# Patient Record
Sex: MALE | Race: WHITE | Employment: FULL TIME | URBAN - METROPOLITAN AREA
[De-identification: names, ages, dates, MRNs, and addresses within clinical notes are randomized per-mention and may not be internally consistent; named-entity substitution may affect disease eponyms.]

---

## 2017-01-08 DIAGNOSIS — E78.00 PURE HYPERCHOLESTEROLEMIA: ICD-10-CM

## 2017-01-08 RX ORDER — SIMVASTATIN 20 MG/1
TABLET, FILM COATED ORAL
Qty: 90 TAB | Refills: 1 | Status: SHIPPED | OUTPATIENT
Start: 2017-01-08 | End: 2017-11-03 | Stop reason: SDUPTHER

## 2017-01-11 ENCOUNTER — TELEPHONE (OUTPATIENT)
Dept: INTERNAL MEDICINE CLINIC | Age: 52
End: 2017-01-11

## 2017-01-11 DIAGNOSIS — E78.00 PURE HYPERCHOLESTEROLEMIA: Primary | ICD-10-CM

## 2017-01-11 NOTE — TELEPHONE ENCOUNTER
Patient has set up his follow up appt for cholesterol. If he needs bloodwork they have to go to [facility] so the wife would like the lab request ahead of time so the results will be back for the appt. On the 20th. She would like a call back if the bloodwork can be done ahead of time.   [phone]

## 2017-01-20 ENCOUNTER — OFFICE VISIT (OUTPATIENT)
Dept: INTERNAL MEDICINE CLINIC | Age: 52
End: 2017-01-20

## 2017-01-20 VITALS
WEIGHT: 188 LBS | HEART RATE: 69 BPM | HEIGHT: 70 IN | RESPIRATION RATE: 12 BRPM | BODY MASS INDEX: 26.92 KG/M2 | TEMPERATURE: 98.2 F | DIASTOLIC BLOOD PRESSURE: 77 MMHG | SYSTOLIC BLOOD PRESSURE: 113 MMHG

## 2017-01-20 DIAGNOSIS — R68.82 LOW LIBIDO: ICD-10-CM

## 2017-01-20 DIAGNOSIS — E78.00 PURE HYPERCHOLESTEROLEMIA: Primary | ICD-10-CM

## 2017-01-20 NOTE — MR AVS SNAPSHOT
Visit Information Date & Time Provider Department Dept. Phone Encounter #  
 1/20/2017  8:15 AM Glennda Baumgarten, MD [facility] [address] [id number] Follow-up Instructions Return in about 6 months (around 7/20/2017). Upcoming Health Maintenance Date Due Hepatitis C Screening 1965 DTaP/Tdap/Td series (1 - Tdap) 9/5/1984 INFLUENZA AGE 9 TO ADULT 10/1/2017* COLONOSCOPY 10/8/2017 *Topic was postponed. The date shown is not the original due date. Allergies as of 1/20/2017  Review Complete On: 1/20/2017 By: Glennda Baumgarten, MD  
  
 Severity Noted Reaction Type Reactions Lipitor [Atorvastatin]  06/20/2016    Other (comments) Current Immunizations  Never Reviewed Name Date Influenza Vaccine 10/1/2015 Not reviewed this visit You Were Diagnosed With   
  
 Codes Comments Pure hypercholesterolemia    -  Primary ICD-10-CM: E78.00 ICD-9-CM: 272.0 Low libido     ICD-10-CM: R68.82 
ICD-9-CM: 799.81 Vitals BP Pulse Temp Resp Height(growth percentile) Weight(growth percentile) 113/77 (BP 1 Location: Left arm, BP Patient Position: Sitting) 69 98.2 °F (36.8 °C) (Oral) 12 5' 10\" (1.778 m) 188 lb (85.3 kg) BMI Smoking Status 26.98 kg/m2 Former Smoker Vitals History BMI and BSA Data Body Mass Index Body Surface Area  
 26.98 kg/m 2 2.05 m 2 Preferred Pharmacy Pharmacy Name Phone [facility] [place] [address] [facility] Your Updated Medication List  
  
   
This list is accurate as of: 1/20/17  8:53 AM.  Always use your most recent med list.  
  
  
  
  
 simvastatin 20 mg tablet Commonly known as:  ZOCOR  
TAKE 1 TABLET BY MOUTH ONE TIME EVERY NIGHT Follow-up Instructions Return in about 6 months (around 7/20/2017). Introducing [facility] & HEALTH SERVICES!    
 Dear Emory Coughlin: 
 Thank you for requesting a Snapwiz account. Our records indicate that you already have an active Snapwiz account. You can access your account anytime at [email or web address] Did you know that you can access your hospital and ER discharge instructions at any time in Snapwiz? You can also review all of your test results from your hospital stay or ER visit. Additional Information If you have questions, please visit the Frequently Asked Questions section of the Snapwiz website at [email or web address] Remember, Snapwiz is NOT to be used for urgent needs. For medical emergencies, dial 911. Now available from your iPhone and Android! Please provide this summary of care documentation to your next provider. Your primary care clinician is listed as Lily Siegel. If you have any questions after today's visit, please call [phone].

## 2017-01-20 NOTE — PROGRESS NOTES
HISTORY OF PRESENT ILLNESS    Chief Complaint   Patient presents with    Cholesterol Problem       Presents for follow-up    Reports his sex drive is decreased for years. His chiropractor referred him to a pharmacy. He is not concerned about this since his wife gained weight. She is 10 years younger. Has sex once per month. Denies significant erectile dysfunction. No hx of low T. Hyperlipidemia  Currently he takes new rx of simvastatin 20 mg  ROS: taking medications as instructed, no medication side effects noted  No new myalgias, no joint pains, no weakness  No TIA's, no chest pain on exertion, no dyspnea on exertion, no swelling of ankles. Labs at [facility] 1/16/17:  Chol 178, , HDL 49,     Lab Results   Component Value Date/Time    Cholesterol, total 286 05/27/2016 08:21 AM    HDL Cholesterol 35 05/27/2016 08:21 AM    LDL, calculated 181 05/27/2016 08:21 AM    VLDL, calculated 70 05/27/2016 08:21 AM    Triglyceride 351 05/27/2016 08:21 AM         Review of Systems   All other systems reviewed and are negative, except as noted in HPI    Past Medical and Surgical History   has a past medical history of Chronic low back pain without sciatica; Chronic lumbar pain; Gastroesophageal reflux disease without esophagitis; GERD (gastroesophageal reflux disease); Neck pain, chronic; and Pure hypercholesterolemia. has a past surgical history that includes lumbar laminectomy (3/5/97); vasectomy (2002); endoscopy (11/8/10); and colonoscopy (10/8/07). reports that he has quit smoking. He does not have any smokeless tobacco history on file. He reports that he drinks alcohol.  family history is negative for Heart Disease, Heart Attack, Cancer, and Asthma. Physical Exam   Nursing note and vitals reviewed. Blood pressure 113/77, pulse 69, temperature 98.2 °F (36.8 °C), temperature source Oral, resp. rate 12, height 5' 10\" (1.778 m), weight 188 lb (85.3 kg). Constitutional:  No distress.     Eyes: Conjunctivae are normal.   Ears:  Hearing grossly intact  Cardiovascular: Normal rate. regular rhythm, no murmurs or gallops  No edema  Pulmonary/Chest: Effort normal.   CTAB  Musculoskeletal: moves all 4 extremities   Neurological: Alert and oriented to person, place, and time. Skin: No rash noted. Psychiatric: Normal mood and affect. Behavior is normal.     ASSESSMENT and PLAN  Jackeline Iniguez was seen today for cholesterol problem. Diagnoses and all orders for this visit:    Pure hypercholesterolemia  Much improved. Cont simvastatin    Low libido  Offered to check testosterone levels- he prefers to wait until next labs. He is not sure if he wants this improve and so may not want treatment anyway. Consider couples counseling. lab results and schedule of future lab studies reviewed with patient  reviewed medications and side effects in detail  Return to clinic for further evaluation if new symptoms develop      Current Outpatient Prescriptions   Medication Sig    simvastatin (ZOCOR) 20 mg tablet TAKE 1 TABLET BY MOUTH ONE TIME EVERY NIGHT     No current facility-administered medications for this visit.

## 2017-04-04 ENCOUNTER — OFFICE VISIT (OUTPATIENT)
Dept: INTERNAL MEDICINE CLINIC | Age: 52
End: 2017-04-04

## 2017-04-04 VITALS
TEMPERATURE: 97.7 F | BODY MASS INDEX: 27.06 KG/M2 | SYSTOLIC BLOOD PRESSURE: 100 MMHG | DIASTOLIC BLOOD PRESSURE: 67 MMHG | WEIGHT: 189 LBS | OXYGEN SATURATION: 94 % | HEIGHT: 70 IN | HEART RATE: 75 BPM | RESPIRATION RATE: 18 BRPM

## 2017-04-04 DIAGNOSIS — J30.1 SEASONAL ALLERGIC RHINITIS DUE TO POLLEN: ICD-10-CM

## 2017-04-04 DIAGNOSIS — H10.13 ALLERGIC CONJUNCTIVITIS, BILATERAL: Primary | ICD-10-CM

## 2017-04-04 RX ORDER — LORATADINE 10 MG/1
10 TABLET ORAL
Qty: 30 TAB | Refills: 2
Start: 2017-04-04 | End: 2017-04-18

## 2017-04-04 RX ORDER — KETOTIFEN FUMARATE 0.35 MG/ML
1 SOLUTION/ DROPS OPHTHALMIC 2 TIMES DAILY
Qty: 10 ML | Refills: 0
Start: 2017-04-04 | End: 2017-04-14

## 2017-04-04 NOTE — MR AVS SNAPSHOT
Visit Information Date & Time Provider Department Dept. Phone Encounter #  
 4/4/2017  3:20 PM Refugio Rosenthal NP [facility] [address] [id number] Upcoming Health Maintenance Date Due Hepatitis C Screening 1965 DTaP/Tdap/Td series (1 - Tdap) 9/5/1984 INFLUENZA AGE 9 TO ADULT 10/1/2017* COLONOSCOPY 10/8/2017 *Topic was postponed. The date shown is not the original due date. Allergies as of 4/4/2017  Review Complete On: 4/4/2017 By: Elizabeth Lima LPN Severity Noted Reaction Type Reactions Lipitor [Atorvastatin]  06/20/2016    Other (comments) Current Immunizations  Never Reviewed Name Date Influenza Vaccine 10/1/2015 Not reviewed this visit You Were Diagnosed With   
  
 Codes Comments Allergic conjunctivitis, bilateral    -  Primary ICD-10-CM: H10.13 ICD-9-CM: 372.14 Seasonal allergic rhinitis due to pollen     ICD-10-CM: J30.1 ICD-9-CM: 477.0 Vitals BP Pulse Temp Resp Height(growth percentile) Weight(growth percentile) 100/67 75 97.7 °F (36.5 °C) (Oral) 18 5' 10\" (1.778 m) 189 lb (85.7 kg) SpO2 BMI Smoking Status 94% 27.12 kg/m2 Former Smoker Vitals History BMI and BSA Data Body Mass Index Body Surface Area  
 27.12 kg/m 2 2.06 m 2 Preferred Pharmacy [facility]acy Name Phone [facility] [address] [facility] Your Updated Medication List  
  
   
This list is accurate as of: 4/4/17  4:09 PM.  Always use your most recent med list.  
  
  
  
  
 ketotifen 0.025 % (0.035 %) ophthalmic solution Commonly known as:  ZADITOR Administer 1 Drop to both eyes two (2) times a day for 10 days. loratadine 10 mg tablet Commonly known as:  [employer] Take 1 Tab by mouth daily as needed for Allergies. simvastatin 20 mg tablet Commonly known as:  ZOCOR  
TAKE 1 TABLET BY MOUTH ONE TIME EVERY NIGHT Patient Instructions Allergies: Care Instructions Your Care Instructions Allergies occur when your body's defense system (immune system) overreacts to certain substances. The immune system treats a harmless substance as if it were a harmful germ or virus. Many things can cause this overreaction, including pollens, medicine, food, dust, animal dander, and mold. Allergies can be mild or severe. Mild allergies can be managed with home treatment. But medicine may be needed to prevent problems. Managing your allergies is an important part of staying healthy. Your doctor may suggest that you have allergy testing to help find out what is causing your allergies. When you know what things trigger your symptoms, you can avoid them. This can prevent allergy symptoms and other health problems. For severe allergies that cause reactions that affect your whole body (anaphylactic reactions), your doctor may prescribe a shot of epinephrine to carry with you in case you have a severe reaction. Learn how to give yourself the shot and keep it with you at all times. Make sure it is not . Follow-up care is a key part of your treatment and safety. Be sure to make and go to all appointments, and call your doctor if you are having problems. It's also a good idea to know your test results and keep a list of the medicines you take. How can you care for yourself at home? · If you have been told by your doctor that dust or dust mites are causing your allergy, decrease the dust around your bed: 
[facility] sheets, pillowcases, and other bedding in hot water every week. ¨ Use dust-proof covers for pillows, duvets, and mattresses. Avoid plastic covers because they tear easily and do not \"breathe. \" Wash as instructed on the label. ¨ Do not use any blankets and pillows that you do not need. ¨ Use blankets that you can wash in your washing machine. ¨ Consider removing drapes and carpets, which attract and hold dust, from your bedroom. · If you are allergic to house dust and mites, do not use home humidifiers. Your doctor can suggest ways you can control dust and mites. · Look for signs of cockroaches. Cockroaches cause allergic reactions. Use cockroach baits to get rid of them. Then, clean your home well. Cockroaches like areas where grocery bags, newspapers, empty bottles, or cardboard boxes are stored. Do not keep these inside your home, and keep trash and food containers sealed. Seal off any spots where cockroaches might enter your home. · If you are allergic to mold, get rid of furniture, rugs, and drapes that smell musty. Check for mold in the bathroom. · If you are allergic to outdoor pollen or mold spores, use air-conditioning. Change or clean all filters every month. Keep windows closed. · If you are allergic to pollen, stay inside when pollen counts are high. Use a vacuum  with a HEPA filter or a double-thickness filter at least two times each week. · Stay inside when air pollution is bad. Avoid paint fumes, perfumes, and other strong odors. · Avoid conditions that make your allergies worse. Stay away from smoke. Do not smoke or let anyone else smoke in your house. Do not use fireplaces or wood-burning stoves. · If you are allergic to your pets, change the air filter in your furnace every month. Use high-efficiency filters. · If you are allergic to pet dander, keep pets outside or out of your bedroom. Old carpet and cloth furniture can hold a lot of animal dander. You may need to replace them. When should you call for help? Give an epinephrine shot if: 
· You think you are having a severe allergic reaction. · You have symptoms in more than one body area, such as mild nausea and an itchy mouth. After giving an epinephrine shot call 911, even if you feel better. Call 911 if: · You have symptoms of a severe allergic reaction. These may include: 
¨ Sudden raised, red areas (hives) all over your body. ¨ Swelling of the throat, mouth, lips, or tongue. ¨ Trouble breathing. ¨ Passing out (losing consciousness). Or you may feel very lightheaded or suddenly feel weak, confused, or restless. · You have been given an epinephrine shot, even if you feel better. Call your doctor now or seek immediate medical care if: 
· You have symptoms of an allergic reaction, such as: ¨ A rash or hives (raised, red areas on the skin). ¨ Itching. ¨ Swelling. ¨ Belly pain, nausea, or vomiting. Watch closely for changes in your health, and be sure to contact your doctor if: 
· You do not get better as expected. Where can you learn more? Go to http://joelle-lee.info/. Enter O967 in the search box to learn more about \"Allergies: Care Instructions. \" Current as of: February 12, 2016 Content Version: 11.2 © 1647-9178 [employer]. Care instructions adapted under license by Set.fm (which disclaims liability or warranty for this information). If you have questions about a medical condition or this instruction, always ask your healthcare professional. [place] [address] any warranty or liability for your use of this information. Pinkeye: Care Instructions Your Care Instructions Pinkeye is redness and swelling of the eye surface and the conjunctiva (the lining of the eyelid and the covering of the white part of the eye). Pinkeye is also called conjunctivitis. Pinkeye is often caused by infection with bacteria or a virus. Dry air, allergies, smoke, and chemicals are other common causes. Pinkeye often clears on its own in 7 to 10 days. Antibiotics only help if the pinkeye is caused by bacteria. Pinkeye caused by infection spreads easily.  If an allergy or chemical is causing pinkeye, it will not go away unless you can avoid whatever is causing it. Follow-up care is a key part of your treatment and safety. Be sure to make and go to all appointments, and call your doctor if you are having problems. Its also a good idea to know your test results and keep a list of the medicines you take. How can you care for yourself at home? · Wash your hands often. Always wash them before and after you treat pinkeye or touch your eyes or face. · Use moist cotton or a clean, wet cloth to remove crust. Wipe from the inside corner of the eye to the outside. Use a clean part of the cloth for each wipe. · Put cold or warm wet cloths on your eye a few times a day if the eye hurts. · Do not wear contact lenses or eye makeup until the pinkeye is gone. Throw away any eye makeup you were using when you got pinkeye. Clean your contacts and storage case. If you wear disposable contacts, use a new pair when your eye has cleared and it is safe to wear contacts again. · If the doctor gave you antibiotic ointment or eyedrops, use them as directed. Use the medicine for as long as instructed, even if your eye starts looking better soon. Keep the bottle tip clean, and do not let it touch the eye area. · To put in eyedrops or ointment: ¨ Tilt your head back, and pull your lower eyelid down with one finger. ¨ Drop or squirt the medicine inside the lower lid. ¨ Close your eye for 30 to 60 seconds to let the drops or ointment move around. ¨ Do not touch the ointment or dropper tip to your eyelashes or any other surface. · Do not share towels, pillows, or washcloths while you have pinkeye. When should you call for help? Call your doctor now or seek immediate medical care if: 
· You have pain in your eye, not just irritation on the surface. · You have a change in vision or loss of vision. · You have an increase in discharge from the eye.  
· Your eye has not started to improve or begins to get worse within 48 hours after you start using antibiotics. · Pinkeye lasts longer than 7 days. Watch closely for changes in your health, and be sure to contact your doctor if you have any problems. Where can you learn more? Go to http://joelle-lee.info/. Enter Y392 in the search box to learn more about \"Pinkeye: Care Instructions. \" Current as of: May 27, 2016 Content Version: 11.2 © 4029-0962 Im[employer]ur. Care instructions adapted under license by Distra (which disclaims liability or warranty for this information). If you have questions about a medical condition or this instruction, always ask your healthcare professional. [address] any warranty or liability for your use of this information. Introducing [facility] & HEALTH SERVICES! Dear Stacy Olivera: Thank you for requesting a Orugga account. Our records indicate that you already have an active Orugga account. You can access your account anytime at [email or web address] Did you know that you can access your hospital and ER discharge instructions at any time in Orugga? You can also review all of your test results from your hospital stay or ER visit. Additional Information If you have questions, please visit the Frequently Asked Questions section of the Orugga website at [email or web address] Remember, Orugga is NOT to be used for urgent needs. For medical emergencies, dial 911. Now available from your iPhone and Android! Please provide this summary of care documentation to your next provider. Your primary care clinician is listed as Elsy Oliveros. If you have any questions after today's visit, please call [phone].

## 2017-04-04 NOTE — PROGRESS NOTES
HISTORY OF PRESENT ILLNESS  Iliana Valero is a 46 y.o. male. HPI  Presents with complaints of reddened itchy watery eyes for the past several days and woke this am with right upper eyelid swollen. Has noted some thicker white drainage but has not seen any yellow or green. Denies vision changes or pain in eyes. Moved to [facility] from [facility] but he is originally from [place] and denies any issues with allergies in past.  Has been noting some increased post nasal drainage and some clear nasal drainage. Denies fever, chills, purulent mucous. Review of Systems   Constitutional: Negative for chills, fever and malaise/fatigue. HENT: Positive for congestion. Negative for sore throat. Eyes: Positive for discharge and redness. Respiratory: Negative for cough, sputum production and shortness of breath. Cardiovascular: Negative for chest pain and palpitations. Gastrointestinal: Negative for nausea and vomiting. Musculoskeletal: Negative for myalgias. Skin: Negative for rash. Neurological: Positive for headaches. Negative for dizziness and tingling. Physical Exam   Constitutional: He is oriented to person, place, and time. He appears well-developed and well-nourished. HENT:   Head: Normocephalic and atraumatic. Nose: Mucosal edema present. Mouth/Throat: No posterior oropharyngeal edema or posterior oropharyngeal erythema. Eyes: EOM are normal. Pupils are equal, round, and reactive to light. Right eye exhibits no exudate. Left eye exhibits no exudate. Right conjunctiva is injected. Left conjunctiva is injected. Bilateral conjunctiva mildly injected with watery appearance; mild edema to right upper eyelid without warmth or tenderness   Neck: Normal range of motion. Neck supple. No thyromegaly present. Cardiovascular: Normal rate and regular rhythm. Pulmonary/Chest: Effort normal and breath sounds normal.   Lymphadenopathy:     He has no cervical adenopathy. Neurological: He is alert and oriented to person, place, and time. Psychiatric: He has a normal mood and affect. His behavior is normal.   Nursing note and vitals reviewed. ASSESSMENT and PLAN  Stacy Joselin was seen today for eye swelling and itchy eye. Diagnoses and all orders for this visit:    Allergic conjunctivitis, bilateral  -     ketotifen (ZADITOR) 0.025 % (0.035 %) ophthalmic solution; Administer 1 Drop to both eyes two (2) times a day for 10 days. Seasonal allergic rhinitis due to pollen  -     loratadine (CLARITIN) 10 mg tablet; Take 1 Tab by mouth daily as needed for Allergies.       lab results and schedule of future lab studies reviewed with patient  reviewed diet, exercise and weight control  reviewed medications and side effects in detail

## 2017-04-04 NOTE — PATIENT INSTRUCTIONS
Allergies: Care Instructions  Your Care Instructions  Allergies occur when your body's defense system (immune system) overreacts to certain substances. The immune system treats a harmless substance as if it were a harmful germ or virus. Many things can cause this overreaction, including pollens, medicine, food, dust, animal dander, and mold. Allergies can be mild or severe. Mild allergies can be managed with home treatment. But medicine may be needed to prevent problems. Managing your allergies is an important part of staying healthy. Your doctor may suggest that you have allergy testing to help find out what is causing your allergies. When you know what things trigger your symptoms, you can avoid them. This can prevent allergy symptoms and other health problems. For severe allergies that cause reactions that affect your whole body (anaphylactic reactions), your doctor may prescribe a shot of epinephrine to carry with you in case you have a severe reaction. Learn how to give yourself the shot and keep it with you at all times. Make sure it is not . Follow-up care is a key part of your treatment and safety. Be sure to make and go to all appointments, and call your doctor if you are having problems. It's also a good idea to know your test results and keep a list of the medicines you take. How can you care for yourself at home? · If you have been told by your doctor that dust or dust mites are causing your allergy, decrease the dust around your bed:  [facility] sheets, pillowcases, and other bedding in hot water every week. ¨ Use dust-proof covers for pillows, duvets, and mattresses. Avoid plastic covers because they tear easily and do not \"breathe. \" Wash as instructed on the label. ¨ Do not use any blankets and pillows that you do not need. ¨ Use blankets that you can wash in your washing machine. ¨ Consider removing drapes and carpets, which attract and hold dust, from your bedroom.   · If you are allergic to house dust and mites, do not use home humidifiers. Your doctor can suggest ways you can control dust and mites. · Look for signs of cockroaches. Cockroaches cause allergic reactions. Use cockroach baits to get rid of them. Then, clean your home well. Cockroaches like areas where grocery bags, newspapers, empty bottles, or cardboard boxes are stored. Do not keep these inside your home, and keep trash and food containers sealed. Seal off any spots where cockroaches might enter your home. · If you are allergic to mold, get rid of furniture, rugs, and drapes that smell musty. Check for mold in the bathroom. · If you are allergic to outdoor pollen or mold spores, use air-conditioning. Change or clean all filters every month. Keep windows closed. · If you are allergic to pollen, stay inside when pollen counts are high. Use a vacuum  with a HEPA filter or a double-thickness filter at least two times each week. · Stay inside when air pollution is bad. Avoid paint fumes, perfumes, and other strong odors. · Avoid conditions that make your allergies worse. Stay away from smoke. Do not smoke or let anyone else smoke in your house. Do not use fireplaces or wood-burning stoves. · If you are allergic to your pets, change the air filter in your furnace every month. Use high-efficiency filters. · If you are allergic to pet dander, keep pets outside or out of your bedroom. Old carpet and cloth furniture can hold a lot of animal dander. You may need to replace them. When should you call for help? Give an epinephrine shot if:  · You think you are having a severe allergic reaction. · You have symptoms in more than one body area, such as mild nausea and an itchy mouth. After giving an epinephrine shot call 911, even if you feel better. Call 911 if:  · You have symptoms of a severe allergic reaction. These may include:  ¨ Sudden raised, red areas (hives) all over your body.   ¨ Swelling of the throat, mouth, lips, or tongue. ¨ Trouble breathing. ¨ Passing out (losing consciousness). Or you may feel very lightheaded or suddenly feel weak, confused, or restless. · You have been given an epinephrine shot, even if you feel better. Call your doctor now or seek immediate medical care if:  · You have symptoms of an allergic reaction, such as:  ¨ A rash or hives (raised, red areas on the skin). ¨ Itching. ¨ Swelling. ¨ Belly pain, nausea, or vomiting. Watch closely for changes in your health, and be sure to contact your doctor if:  · You do not get better as expected. Where can you learn more? Go to http://joelle-lee.info/. Enter O381 in the search box to learn more about \"Allergies: Care Instructions. \"  Current as of: February 12, 2016  Content Version: 11.2  © 2823-7607 [employer]. Care instructions adapted under license by [employer] (which disclaims liability or warranty for this information). If you have questions about a medical condition or this instruction, always ask your healthcare professional. [place] [address] any warranty or liability for your use of this information. Pinkeye: Care Instructions  Your Care Instructions    Pinkeye is redness and swelling of the eye surface and the conjunctiva (the lining of the eyelid and the covering of the white part of the eye). Pinkeye is also called conjunctivitis. Pinkeye is often caused by infection with bacteria or a virus. Dry air, allergies, smoke, and chemicals are other common causes. Pinkeye often clears on its own in 7 to 10 days. Antibiotics only help if the pinkeye is caused by bacteria. Pinkeye caused by infection spreads easily. If an allergy or chemical is causing pinkeye, it will not go away unless you can avoid whatever is causing it. Follow-up care is a key part of your treatment and safety.  Be sure to make and go to all appointments, and call your doctor if you are having problems. Its also a good idea to know your test results and keep a list of the medicines you take. How can you care for yourself at home? · Wash your hands often. Always wash them before and after you treat pinkeye or touch your eyes or face. · Use moist cotton or a clean, wet cloth to remove crust. Wipe from the inside corner of the eye to the outside. Use a clean part of the cloth for each wipe. · Put cold or warm wet cloths on your eye a few times a day if the eye hurts. · Do not wear contact lenses or eye makeup until the pinkeye is gone. Throw away any eye makeup you were using when you got pinkeye. Clean your contacts and storage case. If you wear disposable contacts, use a new pair when your eye has cleared and it is safe to wear contacts again. · If the doctor gave you antibiotic ointment or eyedrops, use them as directed. Use the medicine for as long as instructed, even if your eye starts looking better soon. Keep the bottle tip clean, and do not let it touch the eye area. · To put in eyedrops or ointment:  ¨ Tilt your head back, and pull your lower eyelid down with one finger. ¨ Drop or squirt the medicine inside the lower lid. ¨ Close your eye for 30 to 60 seconds to let the drops or ointment move around. ¨ Do not touch the ointment or dropper tip to your eyelashes or any other surface. · Do not share towels, pillows, or washcloths while you have pinkeye. When should you call for help? Call your doctor now or seek immediate medical care if:  · You have pain in your eye, not just irritation on the surface. · You have a change in vision or loss of vision. · You have an increase in discharge from the eye. · Your eye has not started to improve or begins to get worse within 48 hours after you start using antibiotics. · Pinkeye lasts longer than 7 days. Watch closely for changes in your health, and be sure to contact your doctor if you have any problems. Where can you learn more?   Go to http://joelle-lee.info/. Enter Y392 in the search box to learn more about \"Hebervini: Care Instructions. \"  Current as of: May 27, 2016  Content Version: 11.2  © 2435-1026 [employer]. Care instructions adapted under license by [employer] (which disclaims liability or warranty for this information). If you have questions about a medical condition or this instruction, always ask your healthcare professional. [address] any warranty or liability for your use of this information.

## 2017-04-18 ENCOUNTER — HOSPITAL ENCOUNTER (EMERGENCY)
Age: 52
Discharge: HOME | End: 2017-04-18
Attending: EMERGENCY MEDICINE
Payer: COMMERCIAL

## 2017-04-18 VITALS
HEIGHT: 70 IN | DIASTOLIC BLOOD PRESSURE: 71 MMHG | SYSTOLIC BLOOD PRESSURE: 113 MMHG | TEMPERATURE: 98.2 F | RESPIRATION RATE: 18 BRPM | BODY MASS INDEX: 26.32 KG/M2 | HEART RATE: 82 BPM | OXYGEN SATURATION: 96 % | WEIGHT: 183.86 LBS

## 2017-04-18 DIAGNOSIS — R11.2 NAUSEA VOMITING AND DIARRHEA: Primary | ICD-10-CM

## 2017-04-18 DIAGNOSIS — R19.7 NAUSEA VOMITING AND DIARRHEA: Primary | ICD-10-CM

## 2017-04-18 LAB
ALBUMIN SERPL BCP-MCNC: 3.5 G/DL (ref 3.5–5)
ALBUMIN/GLOB SERPL: 1 {RATIO} (ref 1.1–2.2)
ALP SERPL-CCNC: 67 U/L (ref 45–117)
ALT SERPL-CCNC: 62 U/L (ref 12–78)
ANION GAP BLD CALC-SCNC: 9 MMOL/L (ref 5–15)
AST SERPL W P-5'-P-CCNC: 25 U/L (ref 15–37)
BASOPHILS # BLD AUTO: 0 K/UL (ref 0–0.1)
BASOPHILS # BLD: 0 % (ref 0–1)
BILIRUB SERPL-MCNC: 1.1 MG/DL (ref 0.2–1)
BUN SERPL-MCNC: 10 MG/DL (ref 6–20)
BUN/CREAT SERPL: 8 (ref 12–20)
CALCIUM SERPL-MCNC: 8.6 MG/DL (ref 8.5–10.1)
CHLORIDE SERPL-SCNC: 101 MMOL/L (ref 97–108)
CO2 SERPL-SCNC: 28 MMOL/L (ref 21–32)
CREAT SERPL-MCNC: 1.28 MG/DL (ref 0.7–1.3)
DIFFERENTIAL METHOD BLD: ABNORMAL
EOSINOPHIL # BLD: 0.1 K/UL (ref 0–0.4)
EOSINOPHIL NFR BLD: 2 % (ref 0–7)
ERYTHROCYTE [DISTWIDTH] IN BLOOD BY AUTOMATED COUNT: 11.9 % (ref 11.5–14.5)
GLOBULIN SER CALC-MCNC: 3.6 G/DL (ref 2–4)
GLUCOSE SERPL-MCNC: 105 MG/DL (ref 65–100)
HCT VFR BLD AUTO: 43 % (ref 36.6–50.3)
HGB BLD-MCNC: 15.3 G/DL (ref 12.1–17)
LYMPHOCYTES # BLD AUTO: 23 % (ref 12–49)
LYMPHOCYTES # BLD: 1.3 K/UL
MCH RBC QN AUTO: 31.4 PG (ref 26–34)
MCHC RBC AUTO-ENTMCNC: 35.6 G/DL (ref 30–36.5)
MCV RBC AUTO: 88.1 FL (ref 80–99)
MONOCYTES # BLD: 1 K/UL (ref 0–1)
MONOCYTES NFR BLD AUTO: 18 % (ref 5–13)
NEUTS SEG # BLD: 3.3 K/UL (ref 1.8–8)
NEUTS SEG NFR BLD AUTO: 57 % (ref 32–75)
PLATELET # BLD AUTO: 205 K/UL (ref 150–400)
POTASSIUM SERPL-SCNC: 4 MMOL/L (ref 3.5–5.1)
PROT SERPL-MCNC: 7.1 G/DL (ref 6.4–8.2)
RBC # BLD AUTO: 4.88 M/UL (ref 4.1–5.7)
SODIUM SERPL-SCNC: 138 MMOL/L (ref 136–145)
WBC # BLD AUTO: 5.8 K/UL (ref 4.1–11.1)

## 2017-04-18 PROCEDURE — 87045 FECES CULTURE AEROBIC BACT: CPT | Performed by: EMERGENCY MEDICINE

## 2017-04-18 PROCEDURE — 80053 COMPREHEN METABOLIC PANEL: CPT | Performed by: EMERGENCY MEDICINE

## 2017-04-18 PROCEDURE — 85025 COMPLETE CBC W/AUTO DIFF WBC: CPT | Performed by: EMERGENCY MEDICINE

## 2017-04-18 PROCEDURE — 96361 HYDRATE IV INFUSION ADD-ON: CPT

## 2017-04-18 PROCEDURE — 36415 COLL VENOUS BLD VENIPUNCTURE: CPT | Performed by: EMERGENCY MEDICINE

## 2017-04-18 PROCEDURE — 96374 THER/PROPH/DIAG INJ IV PUSH: CPT

## 2017-04-18 PROCEDURE — 74011250637 HC RX REV CODE- 250/637: Performed by: EMERGENCY MEDICINE

## 2017-04-18 PROCEDURE — 74011250636 HC RX REV CODE- 250/636: Performed by: EMERGENCY MEDICINE

## 2017-04-18 PROCEDURE — 99283 EMERGENCY DEPT VISIT LOW MDM: CPT

## 2017-04-18 RX ORDER — SODIUM CHLORIDE 0.9 % (FLUSH) 0.9 %
5-10 SYRINGE (ML) INJECTION AS NEEDED
Status: DISCONTINUED | OUTPATIENT
Start: 2017-04-18 | End: 2017-04-18 | Stop reason: HOSPADM

## 2017-04-18 RX ORDER — SODIUM CHLORIDE 0.9 % (FLUSH) 0.9 %
5-10 SYRINGE (ML) INJECTION EVERY 8 HOURS
Status: DISCONTINUED | OUTPATIENT
Start: 2017-04-18 | End: 2017-04-18 | Stop reason: HOSPADM

## 2017-04-18 RX ORDER — DICYCLOMINE HYDROCHLORIDE 10 MG/1
20 CAPSULE ORAL
Status: COMPLETED | OUTPATIENT
Start: 2017-04-18 | End: 2017-04-18

## 2017-04-18 RX ORDER — DIPHENOXYLATE HYDROCHLORIDE AND ATROPINE SULFATE 2.5; .025 MG/1; MG/1
TABLET ORAL
Qty: 20 TAB | Refills: 0 | Status: SHIPPED | OUTPATIENT
Start: 2017-04-18 | End: 2018-02-23 | Stop reason: ALTCHOICE

## 2017-04-18 RX ORDER — DIPHENOXYLATE HYDROCHLORIDE AND ATROPINE SULFATE 2.5; .025 MG/1; MG/1
2 TABLET ORAL
Status: COMPLETED | OUTPATIENT
Start: 2017-04-18 | End: 2017-04-18

## 2017-04-18 RX ORDER — ONDANSETRON 4 MG/1
4 TABLET, ORALLY DISINTEGRATING ORAL
Qty: 30 TAB | Refills: 0 | Status: SHIPPED | OUTPATIENT
Start: 2017-04-18 | End: 2018-02-23 | Stop reason: ALTCHOICE

## 2017-04-18 RX ORDER — ONDANSETRON 2 MG/ML
4 INJECTION INTRAMUSCULAR; INTRAVENOUS
Status: COMPLETED | OUTPATIENT
Start: 2017-04-18 | End: 2017-04-18

## 2017-04-18 RX ADMIN — DICYCLOMINE HYDROCHLORIDE 20 MG: 10 CAPSULE ORAL at 10:01

## 2017-04-18 RX ADMIN — SODIUM CHLORIDE 1000 ML: 900 INJECTION, SOLUTION INTRAVENOUS at 08:56

## 2017-04-18 RX ADMIN — ONDANSETRON 4 MG: 2 INJECTION INTRAMUSCULAR; INTRAVENOUS at 08:56

## 2017-04-18 RX ADMIN — DIPHENOXYLATE HYDROCHLORIDE AND ATROPINE SULFATE 2 TABLET: 2.5; .025 TABLET ORAL at 08:56

## 2017-04-18 NOTE — ED NOTES
The patient was discharged home by Dr. Kami Hinkle and Mary Gould RN in stable condition, accompanied by parent/guardian . The patient is alert and oriented, is in no respiratory distress. The patient's diagnosis, condition and treatment were explained to patient or parent/guardian. The patient/responsible party expressed understanding. 2 prescriptions given to pt. No work/school note given to pt. A discharge plan has been developed. A  was not involved in the process. Aftercare instructions were given to the patient.

## 2017-04-18 NOTE — DISCHARGE INSTRUCTIONS
Diarrhea: Care Instructions  Your Care Instructions    Diarrhea is loose, watery stools (bowel movements). The exact cause is often hard to find. Sometimes diarrhea is your body's way of getting rid of what caused an upset stomach. Viruses, food poisoning, and many medicines can cause diarrhea. Some people get diarrhea in response to emotional stress, anxiety, or certain foods. Almost everyone has diarrhea now and then. It usually isn't serious, and your stools will return to normal soon. The important thing to do is replace the fluids you have lost, so you can prevent dehydration. The doctor has checked you carefully, but problems can develop later. If you notice any problems or new symptoms, get medical treatment right away. Follow-up care is a key part of your treatment and safety. Be sure to make and go to all appointments, and call your doctor if you are having problems. It's also a good idea to know your test results and keep a list of the medicines you take. How can you care for yourself at home? · Watch for signs of dehydration, which means your body has lost too much water. Dehydration is a serious condition and should be treated right away. Signs of dehydration are:  ¨ Increasing thirst and dry eyes and mouth. ¨ Feeling faint or lightheaded. ¨ Darker urine, and a smaller amount of urine than normal.  · To prevent dehydration, drink plenty of fluids, enough so that your urine is light yellow or clear like water. Choose water and other caffeine-free clear liquids until you feel better. If you have kidney, heart, or liver disease and have to limit fluids, talk with your doctor before you increase the amount of fluids you drink. · Begin eating small amounts of mild foods the next day, if you feel like it. ¨ Try yogurt that has live cultures of Lactobacillus. (Check the label.)  ¨ Avoid spicy foods, fruits, alcohol, and caffeine until 48 hours after all symptoms are gone.   ¨ Avoid chewing gum that contains sorbitol. ¨ Avoid dairy products (except for yogurt with Lactobacillus) while you have diarrhea and for 3 days after symptoms are gone. · The doctor may recommend that you take over-the-counter medicine, such as loperamide (Imodium), if you still have diarrhea after 6 hours. Read and follow all instructions on the label. Do not use this medicine if you have bloody diarrhea, a high fever, or other signs of serious illness. Call your doctor if you think you are having a problem with your medicine. When should you call for help? Call 911 anytime you think you may need emergency care. For example, call if:  · You passed out (lost consciousness). · Your stools are maroon or very bloody. Call your doctor now or seek immediate medical care if:  · You are dizzy or lightheaded, or you feel like you may faint. · Your stools are black and look like tar, or they have streaks of blood. · You have new or worse belly pain. · You have symptoms of dehydration, such as:  ¨ Dry eyes and a dry mouth. ¨ Passing only a little dark urine. ¨ Feeling thirstier than usual.  · You have a new or higher fever. Watch closely for changes in your health, and be sure to contact your doctor if:  · Your diarrhea is getting worse. · You see pus in the diarrhea. · You are not getting better after 2 days (48 hours). Where can you learn more? Go to http://joelle-lee.info/. Enter X614 in the search box to learn more about \"Diarrhea: Care Instructions. \"  Current as of: May 27, 2016  Content Version: 11.2  © 7620-6297 [employer]. Care instructions adapted under license by [employer] (which disclaims liability or warranty for this information). If you have questions about a medical condition or this instruction, always ask your healthcare professional. [address] any warranty or liability for your use of this information.            We hope that we have addressed all of your medical concerns. The examination and treatment you received in the Emergency Department were for an emergent problem and were not intended as complete care. It is important that you follow up with your healthcare provider(s) for ongoing care. If your symptoms worsen or do not improve as expected, and you are unable to reach your usual health care provider(s), you should return to the Emergency Department. Today's healthcare is undergoing tremendous change, and patient satisfaction surveys are one of the many tools to assess the quality of medical care. You may receive a survey from the [employer] regarding your experience in the Emergency Department. I hope that your experience has been completely positive, particularly the medical care that I provided. As such, please participate in the survey; anything less than excellent does not meet my expectations or intentions. [address] [facility] and [address] [place] [address] participate in nationally recognized quality of care measures. If your blood pressure is greater than 120/80, as reported below, we urge that you seek medical care to address the potential of high blood pressure, commonly known as hypertension. Hypertension can be hereditary or can be caused by certain medical conditions, pain, stress, or \"white coat syndrome. \"       Please make an appointment with your health care provider(s) for follow up of your Emergency Department visit. VITALS:   Patient Vitals for the past 8 hrs:   Temp Pulse Resp BP SpO2   04/18/17 0930 - 82 18 113/71 96 %   04/18/17 0849 98.2 °F (36.8 °C) 77 16 (!) 147/95 97 %          Thank you for allowing us to provide you with medical care today. We realize that you have many choices for your emergency care needs. Please choose us in the future for any continued health care needs. Fern Garcia, [address] [place] [address] [address] Office: [phone]            Recent Results (from the past 24 hour(s))   CBC WITH AUTOMATED DIFF    Collection Time: 04/18/17  8:55 AM   Result Value Ref Range    WBC 5.8 4.1 - 11.1 K/uL    RBC 4.88 4.10 - 5.70 M/uL    HGB 15.3 12.1 - 17.0 g/dL    HCT 43.0 36.6 - 50.3 %    MCV 88.1 80.0 - 99.0 FL    MCH 31.4 26.0 - 34.0 PG    MCHC 35.6 30.0 - 36.5 g/dL    RDW 11.9 11.5 - 14.5 %    PLATELET 080 026 - 060 K/uL    NEUTROPHILS 57 32 - 75 %    LYMPHOCYTES 23 12 - 49 %    MONOCYTES 18 (H) 5 - 13 %    EOSINOPHILS 2 0 - 7 %    BASOPHILS 0 0 - 1 %    ABS. NEUTROPHILS 3.3 1.8 - 8.0 K/UL    ABS. LYMPHOCYTES 1.3 K/UL    ABS. MONOCYTES 1.0 0.0 - 1.0 K/UL    ABS. EOSINOPHILS 0.1 0.0 - 0.4 K/UL    ABS. BASOPHILS 0.0 0.0 - 0.1 K/UL    DF AUTOMATED     METABOLIC PANEL, COMPREHENSIVE    Collection Time: 04/18/17  8:55 AM   Result Value Ref Range    Sodium 138 136 - 145 mmol/L    Potassium 4.0 3.5 - 5.1 mmol/L    Chloride 101 97 - 108 mmol/L    CO2 28 21 - 32 mmol/L    Anion gap 9 5 - 15 mmol/L    Glucose 105 (H) 65 - 100 mg/dL    BUN 10 6 - 20 MG/DL    Creatinine 1.28 0.70 - 1.30 MG/DL    BUN/Creatinine ratio 8 (L) 12 - 20      GFR est AA >60 >60 ml/min/1.73m2    GFR est non-AA 59 (L) >60 ml/min/1.73m2    Calcium 8.6 8.5 - 10.1 MG/DL    Bilirubin, total 1.1 (H) 0.2 - 1.0 MG/DL    ALT (SGPT) 62 12 - 78 U/L    AST (SGOT) 25 15 - 37 U/L    Alk. phosphatase 67 45 - 117 U/L    Protein, total 7.1 6.4 - 8.2 g/dL    Albumin 3.5 3.5 - 5.0 g/dL    Globulin 3.6 2.0 - 4.0 g/dL    A-G Ratio 1.0 (L) 1.1 - 2.2         No results found.

## 2017-04-18 NOTE — ED TRIAGE NOTES
Pt ambulatory to treatment area with c/o \"nausea, abdominal pain, and fevers that started Sunday and then some diarrhea that started last night. \"  Pt denies taking medication for symptoms. Dr. Alvarado  at bedside to evaluate.

## 2017-04-18 NOTE — LETTER
[facility] EMERGENCY DEPT 
[address] [facility] [place] [address] [address] 
[phone] Work/School Note Date: 4/18/2017 To Whom It May concern: 
 
Zev AlstonDevonte was seen and treated today in the emergency room by the following provider(s): 
Attending Provider: Osbaldo Child MD. Zev Hernandez may return to work on 4/20/2017.  
 
Sincerely, 
 
 
 
 
Osbaldo Child MD

## 2017-04-18 NOTE — ED NOTES
Dr. Dipesh Quiroz at bedside to re-evaluate and explain results. Pt and wife verbalize good understanding.

## 2017-04-18 NOTE — ED PROVIDER NOTES
Patient is a 46 y.o. male presenting with abdominal pain and diarrhea. The history is provided by the patient and the spouse. Abdominal Pain    This is a new problem. The current episode started 2 days ago. Progression since onset: waxing and waning, came back worse during the night. The pain is located in the generalized abdominal region. The quality of the pain is aching. The pain is at a severity of 7/10. Associated symptoms include a fever, diarrhea and nausea. Pertinent negatives include no hematochezia, no melena, no vomiting, no dysuria, no frequency and no chest pain. The pain is relieved by nothing. The patient's surgical history non-contributory. Diarrhea    This is a new problem. The current episode started yesterday. The problem occurs constantly. The pain is located in the generalized abdominal region. The pain is at a severity of 7/10. Associated symptoms include a fever, diarrhea and nausea. Pertinent negatives include no hematochezia, no melena, no vomiting, no dysuria, no frequency and no chest pain. The patient's surgical history non-contributory. Past Medical History:   Diagnosis Date    Chronic low back pain without sciatica     Chronic lumbar pain     saw Dr. Carmencita Heimlich; MRI 6/2016, 2012, s/p lumbar laminectomy 2007    Gastroesophageal reflux disease without esophagitis     GERD (gastroesophageal reflux disease)     Neck pain, chronic     seeing jillian Ricardo.  Mild canal stenosis at C5-C6 with moderate left and mild right foraminal      Pure hypercholesterolemia        Past Surgical History:   Procedure Laterality Date    HX COLONOSCOPY  10/8/07    normal, hemorrohoids    HX ENDOSCOPY  11/8/10    mild gastritis    HX LUMBAR LAMINECTOMY  3/5/97    L5- S1 w L5 L disc extrusion  in [place]    HX VASECTOMY  2002         Family History:   Problem Relation Age of Onset    Heart Disease Neg Hx     Heart Attack Neg Hx     Cancer Neg Hx     Asthma Neg Hx        Social History Social History    Marital status: UNKNOWN     Spouse name: Israel Castellanos Number of children: 4so    Years of education: N/A     Occupational History    [employer]      Social History Main Topics    Smoking status: Former Smoker    Smokeless tobacco: Not on file    Alcohol use Yes      Comment: 1-4 per month    Drug use: Not on file    Sexual activity: Yes     Other Topics Concern    Not on file     Social History Narrative    1 son, age 15 (5/2016)         ALLERGIES: Betadine [povidone-iodine]; Lipitor [atorvastatin]; and Nitroglycerin    Review of Systems   Constitutional: Positive for fatigue and fever. Negative for chills. Respiratory: Negative for chest tightness and shortness of breath. Cardiovascular: Negative for chest pain. Gastrointestinal: Positive for abdominal pain, diarrhea and nausea. Negative for hematochezia, melena and vomiting. Genitourinary: Negative for dysuria and frequency. All other systems reviewed and are negative. Vitals:    04/18/17 0849   BP: (!) 147/95   Pulse: 77   Resp: 16   Temp: 98.2 °F (36.8 °C)   SpO2: 97%   Weight: 83.4 kg (183 lb 13.8 oz)   Height: 5' 10\" (1.778 m)            Physical Exam   Constitutional: He is oriented to person, place, and time. He appears well-developed and well-nourished. He appears distressed. HENT:   Head: Normocephalic and atraumatic. Mouth/Throat: Oropharynx is clear and moist.   Eyes: Conjunctivae and EOM are normal.   Neck: Normal range of motion. Cardiovascular: Normal rate, regular rhythm, normal heart sounds and intact distal pulses. No murmur heard. Pulmonary/Chest: Effort normal and breath sounds normal. No stridor. No respiratory distress. Abdominal: Soft. Bowel sounds are normal. There is no tenderness. There is no rebound and no guarding. Musculoskeletal: Normal range of motion. He exhibits no edema, tenderness or deformity.    Neurological: He is alert and oriented to person, place, and time. No cranial nerve deficit. Skin: Skin is warm and dry. He is not diaphoretic. Psychiatric: He has a normal mood and affect. Nursing note and vitals reviewed. MDM  Number of Diagnoses or Management Options  Nausea vomiting and diarrhea:   Diagnosis management comments: Patient with acute GI illness - wife concerned he may have salmonella as he has had it before. Check labs, stool culture, IVF and meds for symptoms.   Re-eval    0945 - patient stable, discussed labs with patient and spouse, stool sample to be sent to lab  D/c home with rx's for symptoms       Amount and/or Complexity of Data Reviewed  Clinical lab tests: ordered and reviewed    Patient Progress  Patient progress: stable    ED Course       Procedures

## 2017-04-20 ENCOUNTER — OFFICE VISIT (OUTPATIENT)
Dept: INTERNAL MEDICINE CLINIC | Age: 52
End: 2017-04-20

## 2017-04-20 ENCOUNTER — TELEPHONE (OUTPATIENT)
Dept: INTERNAL MEDICINE CLINIC | Age: 52
End: 2017-04-20

## 2017-04-20 ENCOUNTER — PATIENT OUTREACH (OUTPATIENT)
Dept: INTERNAL MEDICINE CLINIC | Age: 52
End: 2017-04-20

## 2017-04-20 VITALS
HEIGHT: 70 IN | TEMPERATURE: 98.1 F | SYSTOLIC BLOOD PRESSURE: 113 MMHG | WEIGHT: 180 LBS | DIASTOLIC BLOOD PRESSURE: 79 MMHG | OXYGEN SATURATION: 93 % | BODY MASS INDEX: 25.77 KG/M2 | RESPIRATION RATE: 18 BRPM | HEART RATE: 91 BPM

## 2017-04-20 DIAGNOSIS — E78.00 PURE HYPERCHOLESTEROLEMIA: ICD-10-CM

## 2017-04-20 DIAGNOSIS — R10.33 PERIUMBILICAL ABDOMINAL PAIN: Primary | ICD-10-CM

## 2017-04-20 LAB
BACTERIA SPEC CULT: NORMAL
C JEJUNI+C COLI AG STL QL: NEGATIVE
E COLI SXT1+2 STL IA: NEGATIVE
SERVICE CMNT-IMP: NORMAL

## 2017-04-20 RX ORDER — PROMETHAZINE HYDROCHLORIDE 12.5 MG/1
12.5 TABLET ORAL
Qty: 24 TAB | Refills: 0 | Status: SHIPPED | OUTPATIENT
Start: 2017-04-20 | End: 2018-02-23 | Stop reason: ALTCHOICE

## 2017-04-20 RX ORDER — METRONIDAZOLE 500 MG/1
500 TABLET ORAL 3 TIMES DAILY
Qty: 30 TAB | Refills: 0 | Status: SHIPPED | OUTPATIENT
Start: 2017-04-20 | End: 2018-02-23 | Stop reason: ALTCHOICE

## 2017-04-20 RX ORDER — CIPROFLOXACIN 500 MG/1
500 TABLET ORAL 2 TIMES DAILY
Qty: 20 TAB | Refills: 0 | Status: SHIPPED | OUTPATIENT
Start: 2017-04-20 | End: 2018-02-23 | Stop reason: ALTCHOICE

## 2017-04-20 NOTE — TELEPHONE ENCOUNTER
Pt was in the ER on 4/18/17. He is still having a fever, diarrhea, nausea, etc. Requesting appt today.  Please call [phone]

## 2017-04-20 NOTE — MR AVS SNAPSHOT
Visit Information Date & Time Provider Department Dept. Phone Encounter #  
 4/20/2017  3:15 PM Kandy Hammond MD [facility] [address] [id number] Upcoming Health Maintenance Date Due Hepatitis C Screening 1965 DTaP/Tdap/Td series (1 - Tdap) 9/5/1984 COLONOSCOPY 10/8/2017 INFLUENZA AGE 9 TO ADULT 10/1/2017* *Topic was postponed. The date shown is not the original due date. Allergies as of 4/20/2017  Review Complete On: 4/20/2017 By: Kandy Hammond MD  
  
 Severity Noted Reaction Type Reactions Betadine [Povidone-iodine]  04/18/2017    Rash Lipitor [Atorvastatin]  06/20/2016    Other (comments) Nitroglycerin  04/18/2017    Other (comments) \"made his heart stop\" Current Immunizations  Never Reviewed Name Date Influenza Vaccine 10/1/2015 Not reviewed this visit You Were Diagnosed With   
  
 Codes Comments Periumbilical abdominal pain    -  Primary ICD-10-CM: R10.33 ICD-9-CM: 789.05 Vitals BP Pulse Temp Resp Height(growth percentile) Weight(growth percentile) 113/79 (BP 1 Location: Right arm, BP Patient Position: Sitting) 91 98.1 °F (36.7 °C) (Oral) 18 5' 10\" (1.778 m) 180 lb (81.6 kg) SpO2 BMI Smoking Status 93% 25.83 kg/m2 Former Smoker Vitals History BMI and BSA Data Body Mass Index Body Surface Area  
 25.83 kg/m 2 2.01 m 2 Preferred Pharmacy Pharmacy Name Phone [facility], [address] [facility] Your Updated Medication List  
  
   
This list is accurate as of: 4/20/17  4:07 PM.  Always use your most recent med list.  
  
  
  
  
 ciprofloxacin HCl 500 mg tablet Commonly known as:  CIPRO Take 1 Tab by mouth two (2) times a day. diphenoxylate-atropine 2.5-0.025 mg per tablet Commonly known as:  LOMOTIL  
1-2 tabs po after liquid stool, max of 8 tabs a day metroNIDAZOLE 500 mg tablet Commonly known as:  FLAGYL Take 1 Tab by mouth three (3) times daily. ondansetron 4 mg disintegrating tablet Commonly known as:  ZOFRAN ODT Take 1 Tab by mouth every six (6) hours as needed for Nausea. promethazine 12.5 mg tablet Commonly known as:  PHENERGAN Take 1 Tab by mouth every eight (8) hours as needed for Nausea. Caution can cause sedation  
  
 simvastatin 20 mg tablet Commonly known as:  ZOCOR  
TAKE 1 TABLET BY MOUTH ONE TIME EVERY NIGHT Prescriptions Sent to Pharmacy Refills  
 ciprofloxacin HCl (CIPRO) 500 mg tablet 0 Sig: Take 1 Tab by mouth two (2) times a day. Class: Normal  
 Pharmacy: [facility] [address] [place] [address] Ph #: [phone] Route: Oral  
 metroNIDAZOLE (FLAGYL) 500 mg tablet 0 Sig: Take 1 Tab by mouth three (3) times daily. Class: Normal  
 Pharmacy: [facility] [address] [place] [address] Ph #: [phone] Route: Oral  
 promethazine (PHENERGAN) 12.5 mg tablet 0 Sig: Take 1 Tab by mouth every eight (8) hours as needed for Nausea. Caution can cause sedation Class: Normal  
 Pharmacy: [facility] [address] [place] [address] Ph #: [phone] Route: Oral  
  
To-Do List   
 04/20/2017 Imaging:  CT ABD W WO CONT Referral Information Referral ID Referred By Referred To  
  
 [id number] Loulou BALLARD Not Available Visits Status Start Date End Date 1 New Request 4/20/17 4/20/18 If your referral has a status of pending review or denied, additional information will be sent to support the outcome of this decision. Introducing [facility] HE[facility] SERVICES! Dear Santa Yost: Thank you for requesting a [employer] account. Our records indicate that you already have an active [employer] account. You can access your account anytime at [email or web address] [email or web address]/Open Air Publishing Did you know that you can access your hospital and ER discharge instructions at any time in Lybrate? You can also review all of your test results from your hospital stay or ER visit. Additional Information If you have questions, please visit the Frequently Asked Questions section of the Lybrate website at [email or web address] Remember, Lybrate is NOT to be used for urgent needs. For medical emergencies, dial 911. Now available from your iPhone and Android! Please provide this summary of care documentation to your next provider. Your primary care clinician is listed as Crescencio Ramsay. If you have any questions after today's visit, please call [phone].

## 2017-04-20 NOTE — PROGRESS NOTES
Chief Complaint   Patient presents with    Fever     \"between 102-104 since 4/16; seen at [facility] 4/18    Nausea     x 4 days    Vomiting     x 4 days    Diarrhea     x 4 days     Abdominal pain  Pt presents with wife and reports sx of abdominal pain, fever and vomiting which started 5 days ago. Last emesis 2 days ago because not eating. Sx are severe. He was seen in ER and given zofran but makes him have a headache. He notes he will have abdominal pain, relief with fetal position then  Diarrhea nausea occurs. Monday: not as much diarrhea because did not eat at all. He reports baseline backpain which is not new. Energy level is low and wife notes he is not at his baseline because usually very active. He denies pain on urination or voiding. No blood in urine. colonscopy 2007  Cholesterol  He is taking his statin medication and thinks has se related to his bladder. Past Medical History:   Diagnosis Date    Chronic low back pain without sciatica     Chronic lumbar pain     saw Dr. Mallory Wahl; MRI 6/2016, 2012, s/p lumbar laminectomy 2007    Gastroesophageal reflux disease without esophagitis     GERD (gastroesophageal reflux disease)     Neck pain, chronic     seeing chiro Ricardo Gallardo.  Mild canal stenosis at C5-C6 with moderate left and mild right foraminal      Pure hypercholesterolemia      Past Surgical History:   Procedure Laterality Date    HX COLONOSCOPY  10/8/07    normal, hemorrohoids    HX ENDOSCOPY  11/8/10    mild gastritis    HX LUMBAR LAMINECTOMY  3/5/97    L5- S1 w L5 L disc extrusion  in [place]    HX VASECTOMY  2002     Social History     Social History    Marital status:      Spouse name: Elizabeth Piña Number of children: 4so    Years of education: N/A     Occupational History    [employer]      Social History Main Topics    Smoking status: Former Smoker    Smokeless tobacco: None    Alcohol use Yes      Comment: 1-4 per month    Drug use: None  Sexual activity: Yes     Other Topics Concern    None     Social History Narrative    1 son, age 15 (5/2016)     Family History   Problem Relation Age of Onset    Heart Disease Neg Hx     Heart Attack Neg Hx     Cancer Neg Hx     Asthma Neg Hx      Current Outpatient Prescriptions   Medication Sig Dispense Refill    diphenoxylate-atropine (LOMOTIL) 2.5-0.025 mg per tablet 1-2 tabs po after liquid stool, max of 8 tabs a day 20 Tab 0    simvastatin (ZOCOR) 20 mg tablet TAKE 1 TABLET BY MOUTH ONE TIME EVERY NIGHT 90 Tab 1    ondansetron (ZOFRAN ODT) 4 mg disintegrating tablet Take 1 Tab by mouth every six (6) hours as needed for Nausea. 30 Tab 0     Allergies   Allergen Reactions    Betadine [Povidone-Iodine] Rash    Lipitor [Atorvastatin] Other (comments)    Nitroglycerin Other (comments)     \"made his heart stop\"       Review of Systems - General ROS: positive for  - fatigue, malaise and sleep disturbance  negative for - chills, weight gain or weight loss  Cardiovascular ROS: no chest pain or dyspnea on exertion  Respiratory ROS: no cough, shortness of breath, or wheezing    Visit Vitals    /79 (BP 1 Location: Right arm, BP Patient Position: Sitting)    Pulse 91    Temp 98.1 °F (36.7 °C) (Oral)    Resp 18    Ht 5' 10\" (1.778 m)    Wt 180 lb (81.6 kg)    SpO2 93%    BMI 25.83 kg/m2     General Appearance:  Well developed, well nourished,alert and oriented x 3, and individual in no acute distress. Ears/Nose/Mouth/Throat:   Hearing grossly normal.         Neck: Supple, no lad, no bruits   Chest:   Lungs clear to auscultation bilaterally. Cardiovascular:  Regular rate and rhythm, S1, S2 normal, no murmur. Abdomen:   Soft, non-tender, bowel sounds are active. diffusely tender 5 inches above and across umbilicus, no rebound pain   Extremities: No edema bilaterally.     Skin: Warm and dry, no suspicious lesions                 Jccathy Dobson was seen today for fever, nausea, vomiting and diarrhea. Diagnoses and all orders for this visit:    Periumbilical abdominal pain  Persistent sx without significant improveemnt with conservative care  Will start abx and image ini - pt aware. Able to drink fluids, no fever  -     CT ABD W WO CONT; Future  -     ciprofloxacin HCl (CIPRO) 500 mg tablet; Take 1 Tab by mouth two (2) times a day. -     metroNIDAZOLE (FLAGYL) 500 mg tablet; Take 1 Tab by mouth three (3) times daily. -     promethazine (PHENERGAN) 12.5 mg tablet; Take 1 Tab by mouth every eight (8) hours as needed for Nausea. Caution can cause sedation    Chol  Hold statin for now  No hx of mi/cad    I spent 25 min with this patient and >50% of the time was spent on counseling and management of abdominal pain. Possible diverticulitis ini rtc  This note will not be viewable in [address]

## 2017-04-20 NOTE — PROGRESS NOTES
NNTOCED Call    Patient discharged on 4/18/17 from [facility]. Diagnosis: N/V. Pt noted in for f/u with Dr. Caryle Shields today 4/20/17, see note. Will f/u at a later time.

## 2017-04-26 ENCOUNTER — TELEPHONE (OUTPATIENT)
Dept: INTERNAL MEDICINE CLINIC | Age: 52
End: 2017-04-26

## 2017-04-26 DIAGNOSIS — R10.33 ABDOMINAL PAIN, PERIUMBILIC: Primary | ICD-10-CM

## 2017-04-26 NOTE — TELEPHONE ENCOUNTER
Imaging needs the order changed from CT ABD w wo contrast to  CT pelvic w wo contrast in order to see everything.

## 2017-04-26 NOTE — TELEPHONE ENCOUNTER
Bijal Omer from [facility] needs the order as soon as possible so she can get it approved through insurance.

## 2017-04-27 ENCOUNTER — TELEPHONE (OUTPATIENT)
Dept: INTERNAL MEDICINE CLINIC | Age: 52
End: 2017-04-27

## 2017-04-27 ENCOUNTER — HOSPITAL ENCOUNTER (OUTPATIENT)
Dept: CT IMAGING | Age: 52
Discharge: HOME | End: 2017-04-27
Attending: INTERNAL MEDICINE
Payer: COMMERCIAL

## 2017-04-27 DIAGNOSIS — R10.33 PERIUMBILICAL ABDOMINAL PAIN: ICD-10-CM

## 2017-04-27 DIAGNOSIS — R10.33 ABDOMINAL PAIN, PERIUMBILIC: ICD-10-CM

## 2017-04-27 DIAGNOSIS — R10.33 PERIUMBILICAL ABDOMINAL PAIN: Primary | ICD-10-CM

## 2017-04-27 PROCEDURE — 74011636320 HC RX REV CODE- 636/320: Performed by: INTERNAL MEDICINE

## 2017-04-27 PROCEDURE — 74177 CT ABD & PELVIS W/CONTRAST: CPT

## 2017-04-27 RX ADMIN — IOPAMIDOL 100 ML: 755 INJECTION, SOLUTION INTRAVENOUS at 09:24

## 2017-04-27 NOTE — TELEPHONE ENCOUNTER
----- Message from Conchita Sanchez sent at 4/26/2017  6:13 PM EDT -----  Regarding: Dr. Meenu Mayen with [facility] is requesting for the CT order to be changed to the combo CT Ab and Pelvis with or without(CPT code 98506). The insurance company will only approve the combo. Best contact number [phone].

## 2017-07-24 ENCOUNTER — TELEPHONE (OUTPATIENT)
Dept: INTERNAL MEDICINE CLINIC | Age: 52
End: 2017-07-24

## 2017-07-24 NOTE — TELEPHONE ENCOUNTER
----- Message from Trina Cowart sent at 7/24/2017  4:43 PM EDT -----  Regarding: Dr. Neil Lin, wife, requesting a referral to Urology for pt. Best contact number [phone].

## 2017-08-11 NOTE — TELEPHONE ENCOUNTER
Left message for wife re referral to urologist, saw Dr Jeanette Devries last , why referral to Urologist, PCP does not know.

## 2017-08-16 NOTE — TELEPHONE ENCOUNTER
Pt wife called back advising that when pt goes to urinate it just trickles out no matter how bad he has to go and can not force to come out faster. This has been going on for over a month maybe 2. I advised that Dr BHASKAR ANGUIANO [place] refers the [facility] and provided the phone number.

## 2017-10-09 NOTE — PROGRESS NOTES
Called pt, he did not answer, left v/m for pt of result note. 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 49 y/o M with h/o CKD s/p kidney transplant in 1998, HTN, DM, legally blind admitted for uremic encephalopathy initiated on long term dialysis/HD.

## 2017-11-03 DIAGNOSIS — E78.00 PURE HYPERCHOLESTEROLEMIA: ICD-10-CM

## 2017-11-03 RX ORDER — SIMVASTATIN 20 MG/1
TABLET, FILM COATED ORAL
Qty: 90 TAB | Refills: 2 | Status: SHIPPED | OUTPATIENT
Start: 2017-11-03 | End: 2018-08-15 | Stop reason: SDUPTHER

## 2018-02-20 ENCOUNTER — TELEPHONE (OUTPATIENT)
Dept: INTERNAL MEDICINE CLINIC | Age: 53
End: 2018-02-20

## 2018-02-20 DIAGNOSIS — Z00.00 WELL ADULT EXAM: Primary | ICD-10-CM

## 2018-02-20 NOTE — TELEPHONE ENCOUNTER
Patient's wife is requesting to  patient's lab order to have them drawn prior to his med check apt on Monday for his blood pressure medications.        Wife can be reached at [phone] when the order is ready

## 2018-02-20 NOTE — TELEPHONE ENCOUNTER
Pt rescheduled and now seeing Dr Katerin Rausch on Friday 2/23/18 at 3:30.   She is waiting for call and will come  the lab order

## 2018-02-23 ENCOUNTER — OFFICE VISIT (OUTPATIENT)
Dept: INTERNAL MEDICINE CLINIC | Age: 53
End: 2018-02-23

## 2018-02-23 VITALS
TEMPERATURE: 97.6 F | HEIGHT: 70 IN | BODY MASS INDEX: 26.34 KG/M2 | WEIGHT: 184 LBS | SYSTOLIC BLOOD PRESSURE: 104 MMHG | RESPIRATION RATE: 12 BRPM | HEART RATE: 67 BPM | DIASTOLIC BLOOD PRESSURE: 70 MMHG

## 2018-02-23 DIAGNOSIS — E78.00 PURE HYPERCHOLESTEROLEMIA: ICD-10-CM

## 2018-02-23 DIAGNOSIS — N40.1 BPH WITH OBSTRUCTION/LOWER URINARY TRACT SYMPTOMS: ICD-10-CM

## 2018-02-23 DIAGNOSIS — Z00.00 WELL ADULT EXAM: Primary | ICD-10-CM

## 2018-02-23 DIAGNOSIS — F41.8 DEPRESSION WITH ANXIETY: ICD-10-CM

## 2018-02-23 DIAGNOSIS — R97.20 PSA ELEVATION: ICD-10-CM

## 2018-02-23 DIAGNOSIS — M70.61 TROCHANTERIC BURSITIS OF RIGHT HIP: ICD-10-CM

## 2018-02-23 DIAGNOSIS — N13.8 BPH WITH OBSTRUCTION/LOWER URINARY TRACT SYMPTOMS: ICD-10-CM

## 2018-02-23 LAB
ALBUMIN SERPL-MCNC: 4.7 G/DL (ref 3.5–5.5)
ALBUMIN/GLOB SERPL: 1.9 {RATIO} (ref 1.2–2.2)
ALP SERPL-CCNC: 68 IU/L (ref 39–117)
ALT SERPL-CCNC: 39 IU/L (ref 0–44)
AST SERPL-CCNC: 24 IU/L (ref 0–40)
BILIRUB SERPL-MCNC: 0.9 MG/DL (ref 0–1.2)
BUN SERPL-MCNC: 19 MG/DL (ref 6–24)
BUN/CREAT SERPL: 16 (ref 9–20)
CALCIUM SERPL-MCNC: 9.6 MG/DL (ref 8.7–10.2)
CHLORIDE SERPL-SCNC: 100 MMOL/L (ref 96–106)
CHOLEST SERPL-MCNC: 188 MG/DL (ref 100–199)
CO2 SERPL-SCNC: 27 MMOL/L (ref 18–29)
CREAT SERPL-MCNC: 1.19 MG/DL (ref 0.76–1.27)
ERYTHROCYTE [DISTWIDTH] IN BLOOD BY AUTOMATED COUNT: 12.8 % (ref 12.3–15.4)
GFR SERPLBLD CREATININE-BSD FMLA CKD-EPI: 69 ML/MIN/{1.73_M2}
GFR SERPLBLD CREATININE-BSD FMLA CKD-EPI: 80 ML/MIN/{1.73_M2}
GLOBULIN SER CALC-MCNC: 2.5 G/L (ref 1.5–4.5)
GLUCOSE SERPL-MCNC: 88 MG/DL (ref 65–99)
HCT VFR BLD AUTO: 46.2 % (ref 37.5–51)
HDLC SERPL-MCNC: 44 MG/DL
HGB BLD-MCNC: 16.3 G/DL (ref 13–17.7)
INTERPRETATION, 910389: NORMAL
LDLC SERPL CALC-MCNC: 114 MG/DL (ref 0–99)
MCH RBC QN AUTO: 31.1 PG (ref 26.6–33)
MCHC RBC AUTO-ENTMCNC: 35.3 G/DL (ref 31.5–35.7)
MCV RBC AUTO: 88 FL (ref 79–97)
PLATELET # BLD AUTO: 255 X10E3/UL (ref 150–379)
POTASSIUM SERPL-SCNC: 4.7 MMOL/L (ref 3.5–5.2)
PROT SERPL-MCNC: 7.2 G/DL (ref 6–8.5)
PSA FREE MFR SERPL: 14.8 %
PSA FREE SERPL-MCNC: 0.59 NG/ML
PSA SERPL-MCNC: 4 NG/ML (ref 0–4)
RBC # BLD AUTO: 5.24 X10E6/UL (ref 4.14–5.8)
REFLEX CRITERIA: NORMAL
SODIUM SERPL-SCNC: 139 MMOL/L (ref 134–144)
TRIGL SERPL-MCNC: 150 MG/DL (ref 0–149)
VLDLC SERPL CALC-MCNC: 30 MG/DL (ref 5–40)
WBC # BLD AUTO: 6.1 X10E3/UL (ref 3.4–10.8)

## 2018-02-23 RX ORDER — TAMSULOSIN HYDROCHLORIDE 0.4 MG/1
0.4 CAPSULE ORAL DAILY
Qty: 30 CAP | Refills: 5 | Status: SHIPPED | OUTPATIENT
Start: 2018-02-23 | End: 2018-04-25 | Stop reason: SDUPTHER

## 2018-02-23 RX ORDER — ESCITALOPRAM OXALATE 10 MG/1
10 TABLET ORAL DAILY
Qty: 30 TAB | Refills: 2 | Status: SHIPPED | OUTPATIENT
Start: 2018-02-23 | End: 2018-04-25 | Stop reason: SDUPTHER

## 2018-02-23 NOTE — PROGRESS NOTES
Tristan Victor. is a 45 y.o. male  Presenting for his annual checkup and health maintenance review and follow-up  Was scheduled for follow-up, but essentially needs a physical.    Hyperlipidemia   Currently he takes simvastatin 20 mg  ROS: taking medications as instructed, no medication side effects noted  No new myalgias, no joint pains, no weakness  No TIA's, no chest pain on exertion, no dyspnea on exertion, no swelling of ankles. Lab Results   Component Value Date/Time    Cholesterol, total 188 02/22/2018 08:45 AM    HDL Cholesterol 44 02/22/2018 08:45 AM    LDL, calculated 114 (H) 02/22/2018 08:45 AM    VLDL, calculated 30 02/22/2018 08:45 AM    Triglyceride 150 (H) 02/22/2018 08:45 AM     Reports he is under a lot of stress at work. There is a lawsuit that he is having to deal with. He has been seeing a counselor over the past 2 months. Reports insomnia, depressive symptoms, moderate anxiety and panic. Counseling has been helpful. I have a note from his counselor stating that we should consider medication to help with depression, anxiety, insomnia. Patient denies any suicidal thoughts or ideation. Patient has no prior history of depression or anxiety. He is not drinking significant alcohol. Reports right lateral hip pain for about 1 month. It is moderate. No prior injuries. Denies any falls. Has a history of chronic lower back pain but this pain is tender to touch and hurts when he is lying on his side it is different from his lower back pain. Lab results show that his PSA has increased somewhat from 2.6 to 4.0. Free PSA percentage correlates with a 25% chance of prostate cancer. Patient actually did consult with urology August 2017 because of urinary hesitancy. Rectal exam was normal at that time. I do not think PSA was repeated, but was quoted from 2016 result. Urinalysis was normal.  Consider treatment of benign prostate hypertrophy with patient elected for observation. Patient reports ongoing urinary hesitancy, nocturia 2 times. Straining to urinate. He is frustrated by symptoms. Exercise: moderately active  Diet: generally follows a low fat low cholesterol diet  Health Maintenance   Topic Date Due    DTaP/Tdap/Td series (1 - Tdap) 09/05/1984    COLONOSCOPY  10/08/2017    Hepatitis C Screening  Addressed    Influenza Age 5 to Adult  Completed     Health Maintenance reviewed  Last digital rectal exam:  8/2017  Lab Results   Component Value Date/Time    Prostate Specific Ag 4.0 02/22/2018 08:45 AM    Prostate Specific Ag 2.9 05/27/2016 08:21 AM    % Free PSA 14.8 02/22/2018 08:45 AM       Vaccinations reviewed  Immunization History   Administered Date(s) Administered    Influenza Vaccine 10/01/2015       Past Medical History:   Diagnosis Date    Chronic low back pain without sciatica     Chronic lumbar pain     saw Dr. Tom Jay; MRI 6/2016, 2012, s/p lumbar laminectomy 2007    Gastroesophageal reflux disease without esophagitis     GERD (gastroesophageal reflux disease)     Neck pain, chronic     seeing chiro Laxmi Runner. Mild canal stenosis at C5-C6 with moderate left and mild right foraminal      Pure hypercholesterolemia       has a past surgical history that includes hx lumbar laminectomy (3/5/97); hx vasectomy (2002); hx endoscopy (11/8/10); and hx colonoscopy (10/8/07). Betadine [povidone-iodine]; Lipitor [atorvastatin]; and Nitroglycerin   Current Outpatient Prescriptions   Medication Sig    simvastatin (ZOCOR) 20 mg tablet TAKE 1 TABLET BY MOUTH ONE TIME EVERY NIGHT     No current facility-administered medications for this visit. SOCIAL HX:  reports that he has quit smoking. He has never used smokeless tobacco. He reports that he drinks alcohol. FAMILY HX: family history is negative for Heart Disease, Heart Attack, Cancer, and Asthma.     Review of Systems - History obtained from the patient  General ROS: negative for - night sweats, weight gain or weight loss  Cardiovascular ROS: no chest pain, dyspnea on exertion, edema    Physical exam  Blood pressure 104/70, pulse 67, temperature 97.6 °F (36.4 °C), resp. rate 12, height 5' 10\" (1.778 m), weight 184 lb (83.5 kg). Wt Readings from Last 3 Encounters:   02/23/18 184 lb (83.5 kg)   04/20/17 180 lb (81.6 kg)   04/18/17 183 lb 13.8 oz (83.4 kg)     He appears well, alert and oriented x 3, pleasant and cooperative. Vitals as noted. No rashes or significant lesions. Neck supple and free of adenopathy, or masses. No thyromegaly or carotid bruits. Cranial nerves normal. Lungs are clear to auscultation. Heart sounds are normal with no murmurs, clicks, gallops or rubs. Abdomen is soft, non- tender, with no masses or organomegaly. Right trochanteric bursa area with moderate tenderness with deep palpation. No palpable abnormality. Extremities, peripheral pulses and reflexes are normal.  . RECTAL/PROSTATE EXAM: smooth and symmetric without nodules or tenderness, enlarged 2+. Skin is without rashes or suspicious lesions. Diagnoses and all orders for this visit:    1. Well adult exam    2. Trochanteric bursitis of right hip  Using a sterile needle, injected 1 cc of cortisone and 1% lidocaine into the bursa. Tolerated procedure well. Ice. Consider orthopedic referral if not improving. 3. Pure hypercholesterolemia    cholesterol is much improved on simvastatin. Continue. 4. Depression with anxiety situational symptoms from work stress. He is seeing a counselor which I encouraged to continue. Avoid alcohol. Trial of Lexapro. Side effects discussed. Titrate as needed. Follow-up in 2 months. -     escitalopram oxalate (LEXAPRO) 10 mg tablet; Take 1 Tab by mouth daily. Indications: ANXIETY WITH DEPRESSION    5. BPH with obstruction/lower urinary tract symptoms  6. PSA elevation  Ongoing symptoms for over 6 months. Exam is relatively benign but with some prosthetic enlargement.   PSA is only mildly increasing. Recommend trial of Flomax for now. Repeat PSA in 6 months. Refer back to urology if symptoms are not improving or if PSA is increasing further. I do not think prostate biopsy is warranted just yet. Discussed how PSA testing does not diagnose prostate cancer.  -     tamsulosin (FLOMAX) 0.4 mg capsule; Take 1 Cap by mouth daily.  Indications: benign prostatic hyperplasia with lower urinary tract sx    The patient is asked to make an attempt to improve diet and exercise patterns  Avoid tobacco products, excessive alcohol    Return for yearly wellness visits

## 2018-02-23 NOTE — MR AVS SNAPSHOT
[facility] 
 
 
 [address] 
[phone] Patient: Colette Sears. MRN: [id number]:[id number] Visit Information Date & Time Provider Department Dept. Phone Encounter #  
 2/23/2018  3:30 PM Jose Mccray MD [facility] [address] [facility] [id number] Upcoming Health Maintenance Date Due DTaP/Tdap/Td series (1 - Tdap) 9/5/1984 COLONOSCOPY 10/8/2017 Allergies as of 2/23/2018  Review Complete On: 2/23/2018 By: Jose Mccray MD  
  
 Severity Noted Reaction Type Reactions Betadine [Povidone-iodine]  04/18/2017    Rash Lipitor [Atorvastatin]  06/20/2016    Other (comments) Nitroglycerin  04/18/2017    Other (comments) \"made his heart stop\" Current Immunizations  Reviewed on 2/23/2018 Name Date Influenza Vaccine 10/1/2015 Reviewed by Jose Mccray MD on 2/23/2018 at  4:01 PM  
You Were Diagnosed With   
  
 Codes Comments Well adult exam    -  Primary ICD-10-CM: Z00.00 ICD-9-CM: V70.0 Trochanteric bursitis of right hip     ICD-10-CM: M70.61 ICD-9-CM: 726.5 Pure hypercholesterolemia     ICD-10-CM: E78.00 ICD-9-CM: 272.0   
 PSA elevation     ICD-10-CM: R97.20 ICD-9-CM: 790.93 Urinary hesitancy     ICD-10-CM: R39.11 ICD-9-CM: 788.64 Other insomnia     ICD-10-CM: G47.09 
ICD-9-CM: 780.52 Chronic low back pain without sciatica, unspecified back pain laterality     ICD-10-CM: M54.5, G89.29 ICD-9-CM: 724.2, 338.29 Vitals BP Pulse Temp Resp Height(growth percentile) Weight(growth percentile) 104/70 (BP 1 Location: Left arm, BP Patient Position: Sitting) 67 97.6 °F (36.4 °C) 12 5' 10\" (1.778 m) 184 lb (83.5 kg) BMI Smoking Status 26.4 kg/m2 Former Smoker Vitals History BMI and BSA Data Body Mass Index Body Surface Area  
 26.4 kg/m 2 2.03 m 2 Preferred Pharmacy Pharmacy Name Phone [facility] [address] [facility] [address] Your Updated Medication List  
  
   
This list is accurate as of 2/23/18  4:26 PM.  Always use your most recent med list.  
  
  
  
  
 escitalopram oxalate 10 mg tablet Commonly known as:  Blanca Friendly Take 1 Tab by mouth daily. Indications: ANXIETY WITH DEPRESSION  
  
 simvastatin 20 mg tablet Commonly known as:  ZOCOR  
TAKE 1 TABLET BY MOUTH ONE TIME EVERY NIGHT  
  
 tamsulosin 0.4 mg capsule Commonly known as:  FLOMAX Take 1 Cap by mouth daily. Indications: benign prostatic hyperplasia with lower urinary tract sx Prescriptions Sent to Pharmacy Refills  
 tamsulosin (FLOMAX) 0.4 mg capsule 5 Sig: Take 1 Cap by mouth daily. Indications: benign prostatic hyperplasia with lower urinary tract sx Class: Normal  
 Pharmacy: [facility] [address] [place] [address] #: [phone] Route: Oral  
 escitalopram oxalate (LEXAPRO) 10 mg tablet 2 Sig: Take 1 Tab by mouth daily. Indications: ANXIETY WITH DEPRESSION Class: Normal  
 Pharmacy: [facility] [address] [place] [address] #: [phone] Route: Oral  
  
Introducing [facility]! Dear Phil Lee: Thank you for requesting a oneforty account. Our records indicate that you already have an active oneforty account. You can access your account anytime at [email or web address] Did you know that you can access your hospital and ER discharge instructions at any time in oneforty? You can also review all of your test results from your hospital stay or ER visit. Additional Information If you have questions, please visit the Frequently Asked Questions section of the oneforty website at [email or web address] Remember, oneforty is NOT to be used for urgent needs. For medical emergencies, dial 911. Now available from your iPhone and Android! Please provide this summary of care documentation to your next provider. Your primary care clinician is listed as Alonzo Guerrero. If you have any questions after today's visit, please call [phone].

## 2018-02-23 NOTE — PROGRESS NOTES
Presents for follow up. Labs are done. He is having sleep issues. Having deep pain in right hip. Has chronic LBP.

## 2018-04-25 ENCOUNTER — OFFICE VISIT (OUTPATIENT)
Dept: INTERNAL MEDICINE CLINIC | Age: 53
End: 2018-04-25

## 2018-04-25 VITALS
DIASTOLIC BLOOD PRESSURE: 65 MMHG | RESPIRATION RATE: 16 BRPM | WEIGHT: 185.6 LBS | SYSTOLIC BLOOD PRESSURE: 97 MMHG | HEART RATE: 76 BPM | TEMPERATURE: 97.9 F | BODY MASS INDEX: 26.57 KG/M2 | HEIGHT: 70 IN | OXYGEN SATURATION: 97 %

## 2018-04-25 DIAGNOSIS — F41.8 DEPRESSION WITH ANXIETY: Primary | ICD-10-CM

## 2018-04-25 DIAGNOSIS — N13.8 BPH WITH OBSTRUCTION/LOWER URINARY TRACT SYMPTOMS: ICD-10-CM

## 2018-04-25 DIAGNOSIS — E78.00 PURE HYPERCHOLESTEROLEMIA: ICD-10-CM

## 2018-04-25 DIAGNOSIS — N40.1 BPH WITH OBSTRUCTION/LOWER URINARY TRACT SYMPTOMS: ICD-10-CM

## 2018-04-25 RX ORDER — ESCITALOPRAM OXALATE 10 MG/1
10 TABLET ORAL DAILY
Qty: 90 TAB | Refills: 1 | Status: SHIPPED | OUTPATIENT
Start: 2018-04-25 | End: 2018-08-24 | Stop reason: SDUPTHER

## 2018-04-25 RX ORDER — TAMSULOSIN HYDROCHLORIDE 0.4 MG/1
0.4 CAPSULE ORAL DAILY
Qty: 90 CAP | Refills: 1 | Status: SHIPPED | OUTPATIENT
Start: 2018-04-25 | End: 2018-11-17 | Stop reason: SDUPTHER

## 2018-04-25 NOTE — PROGRESS NOTES
HISTORY OF PRESENT ILLNESS    Chief Complaint   Patient presents with    Cholesterol Problem     hypercholestemia check     Medication Refill       Presents for follow-up    Anxiety and dysthymia-  from work stress   Current treatment includes Lexapro since 2/2018 and counseling. Reports is is less overwhelmed and less irritable. His counselor and wife feel it helps. He agrees that \"the dark cloud does not bother me as much\"  Ongoing symptoms include: none. Patient denies: sweating, chest pain, dizziness, paresthesias, racing thoughts, feelings of losing control, difficulty concentrating, suicidal ideation. Denies substance or alcohol abuse. Reported side effects from the treatment: none. Prostate enlargement/BPH - taking flomax and is no longer waking up at night. PSA increased 2.9 8/2017 to PSA 4.0. Hyperlipidemia  Takes simvastatin 20 mg  ROS: taking medications as instructed, no medication side effects noted  No new myalgias, no joint pains, no weakness  No TIA's, no chest pain on exertion, no dyspnea on exertion, no swelling of ankles. Lab Results   Component Value Date/Time    Cholesterol, total 188 02/22/2018 08:45 AM    HDL Cholesterol 44 02/22/2018 08:45 AM    LDL, calculated 114 (H) 02/22/2018 08:45 AM    VLDL, calculated 30 02/22/2018 08:45 AM    Triglyceride 150 (H) 02/22/2018 08:45 AM         Review of Systems   All other systems reviewed and are negative, except as noted in HPI    Past Medical and Surgical History   has a past medical history of BPH with obstruction/lower urinary tract symptoms (08/2017); Chronic low back pain without sciatica; Chronic lumbar pain; Gastroesophageal reflux disease without esophagitis; GERD (gastroesophageal reflux disease); Neck pain, chronic; and Pure hypercholesterolemia. has a past surgical history that includes hx lumbar laminectomy (3/5/97); hx vasectomy (2002); hx endoscopy (11/8/10); and hx colonoscopy (10/8/07).      reports that he has quit smoking. He has never used smokeless tobacco. He reports that he drinks alcohol.  family history is negative for Heart Disease, Heart Attack, Cancer, and Asthma. Physical Exam   Nursing note and vitals reviewed. Blood pressure 97/65, pulse 76, temperature 97.9 °F (36.6 °C), temperature source Oral, resp. rate 16, height 5' 10\" (1.778 m), weight 185 lb 9.6 oz (84.2 kg), SpO2 97 %. Constitutional:  No distress. Eyes: Conjunctivae are normal.   Ears:  Hearing grossly intact  Cardiovascular: Normal rate. regular rhythm, no murmurs or gallops  No edema  Pulmonary/Chest: Effort normal.   CTAB  Musculoskeletal: moves all 4 extremities   Neurological: Alert and oriented to person, place, and time. Skin: No rash noted. Psychiatric: Normal mood and affect. Behavior is normal.     Diagnoses and all orders for this visit:    1. Depression with anxiety- doing better. Cont lexapro and counseling. Potential medication side effects were discussed with the patient; let me know if any occur.  -     escitalopram oxalate (LEXAPRO) 10 mg tablet; Take 1 Tab by mouth daily. Indications: ANXIETY WITH DEPRESSION    2. BPH with obstruction/lower urinary tract symptoms  Improved s/s. Repeat PSA 8/2018 and refer again prn increasing  -     PSA W/ REFLX FREE PSA  -     tamsulosin (FLOMAX) 0.4 mg capsule; Take 1 Cap by mouth daily. Indications: benign prostatic hyperplasia with lower urinary tract sx    3. Pure hypercholesterolemia- Controlled on current regimen. Continue current medications as written in chart.  -     METABOLIC PANEL, COMPREHENSIVE  -     LIPID PANEL          lab results and schedule of future lab studies reviewed with patient  reviewed medications and side effects in detail  Return to clinic for further evaluation if new symptoms develop      Current Outpatient Prescriptions   Medication Sig    escitalopram oxalate (LEXAPRO) 10 mg tablet Take 1 Tab by mouth daily.  Indications: ANXIETY WITH DEPRESSION    tamsulosin (FLOMAX) 0.4 mg capsule Take 1 Cap by mouth daily. Indications: benign prostatic hyperplasia with lower urinary tract sx    simvastatin (ZOCOR) 20 mg tablet TAKE 1 TABLET BY MOUTH ONE TIME EVERY NIGHT     No current facility-administered medications for this visit.

## 2018-04-25 NOTE — PROGRESS NOTES
Pablo King is a 45 y.o. male    Chief Complaint   Patient presents with    Cholesterol Problem     hypercholestemia check     Medication Refill       1. Have you been to the ER, urgent care clinic since your last visit? Hospitalized since your last visit? No    2. Have you seen or consulted any other health care providers outside of the [address] [place] [address] since your last visit? Include any pap smears or colon screening.   No    Visit Vitals    BP 97/65 (BP 1 Location: Left arm, BP Patient Position: Sitting)    Pulse 76    Temp 97.9 °F (36.6 °C) (Oral)    Resp 16    Ht 5' 10\" (1.778 m)    Wt 185 lb 9.6 oz (84.2 kg)    SpO2 97%    BMI 26.63 kg/m2

## 2018-04-25 NOTE — MR AVS SNAPSHOT
[facility] 
 
 
 [address] [facility] [address] [facility] 
[phone] Patient: Chauncey Vasquez. MRN: [id number] LMD:[birth year] Visit Information Date & Time Provider Department Dept. Phone Encounter #  
 4/25/2018  1:00 PM Lauren Goodell, MD [facility] [address] [facility] [id number] Your Appointments 8/24/2018  4:00 PM  
ROUTINE CARE with Lauren Goodell, MD  
[facility] App[facility] Note: cholesterol / low back pain 6 month eval  
 [place] [address] [address]  
[phone]  
  
   
 [address] [facility] [address] [facility] [address] Upcoming Health Maintenance Date Due DTaP/Tdap/Td series (1 - Tdap) 9/5/1984 COLONOSCOPY 10/8/2017 Allergies as of 4/25/2018  Review Complete On: 4/25/2018 By: Lauren Goodell, MD  
  
 Severity Noted Reaction Type Reactions Betadine [Povidone-iodine]  04/18/2017    Rash Lipitor [Atorvastatin]  06/20/2016    Other (comments) Nitroglycerin  04/18/2017    Other (comments) \"made his heart stop\" Current Immunizations  Reviewed on 2/23/2018 Name Date Influenza Vaccine 10/1/2015 Not reviewed this visit You Were Diagnosed With   
  
 Codes Comments Depression with anxiety    -  Primary ICD-10-CM: F41.8 ICD-9-CM: 300.4 BPH with obstruction/lower urinary tract symptoms     ICD-10-CM: N40.1, N13.8 ICD-9-CM: 600.01, 599.69 Pure hypercholesterolemia     ICD-10-CM: E78.00 ICD-9-CM: 272.0 Vitals BP Pulse Temp Resp Height(growth percentile) Weight(growth percentile) 97/65 (BP 1 Location: Left arm, BP Patient Position: Sitting) 76 97.9 °F (36.6 °C) (Oral) 16 5' 10\" (1.778 m) 185 lb 9.6 oz (84.2 kg) SpO2 BMI Smoking Status 97% 26.63 kg/m2 Former Smoker Vitals History BMI and BSA Data  Body Mass Index Body Surface Area  
 26.63 kg/m 2 2.04 m 2  
  
  
 Preferred Pharmacy Pharmacy Name Phone [facility] [place], [address] [facility] Your Updated Medication List  
  
   
This list is accurate as of 4/25/18  1:25 PM.  Always use your most recent med list.  
  
  
  
  
 escitalopram oxalate 10 mg tablet Commonly known as:  Cohn Estrin Take 1 Tab by mouth daily. Indications: ANXIETY WITH DEPRESSION  
  
 simvastatin 20 mg tablet Commonly known as:  ZOCOR  
TAKE 1 TABLET BY MOUTH ONE TIME EVERY NIGHT  
  
 tamsulosin 0.4 mg capsule Commonly known as:  FLOMAX Take 1 Cap by mouth daily. Indications: benign prostatic hyperplasia with lower urinary tract sx Prescriptions Sent to Pharmacy Refills  
 escitalopram oxalate (LEXAPRO) 10 mg tablet 1 Sig: Take 1 Tab by mouth daily. Indications: ANXIETY WITH DEPRESSION Class: Normal  
 Pharmacy: [facility] [id number] [place] [address] #: [phone] Route: Oral  
 tamsulosin (FLOMAX) 0.4 mg capsule 1 Sig: Take 1 Cap by mouth daily. Indications: benign prostatic hyperplasia with lower urinary tract sx Class: Normal  
 Pharmacy: [facility] [address] [place] [address] Ph #: [phone] Route: Oral  
  
We Performed the Following LIPID PANEL [43332 CPT(R)] METABOLIC PANEL, COMPREHENSIVE [96091 CPT(R)] PSA W/ REFLX FREE PSA [77052 CPT(R)] Introducing [facility]! Dear Kim Weaver: Thank you for requesting a [employer] account. Our records indicate that you already have an active [employer] account. You can access your account anytime at [email or web address] Did you know that you can access your hospital and ER discharge instructions at any time in [employer]? You can also review all of your test results from your hospital stay or ER visit. Additional Information If you have questions, please visit the Frequently Asked Questions section of the [employer] website at [email or web address]. [email or web address] Remember, [employer] is NOT to be used for urgent needs. For medical emergencies, dial 911. Now available from your iPhone and Android! Please provide this summary of care documentation to your next provider. Your primary care clinician is listed as Roldan Pat. If you have any questions after today's visit, please call [phone].

## 2018-06-21 ENCOUNTER — TELEPHONE (OUTPATIENT)
Dept: INTERNAL MEDICINE CLINIC | Age: 53
End: 2018-06-21

## 2018-06-21 NOTE — TELEPHONE ENCOUNTER
Patient wife request a return call regarding obtaining an appointment before next week for Rx Lexapro medication evaluation Patient contact wife Iván Carrillo [phone] (M)

## 2018-06-22 ENCOUNTER — OFFICE VISIT (OUTPATIENT)
Dept: INTERNAL MEDICINE CLINIC | Age: 53
End: 2018-06-22

## 2018-06-22 VITALS
WEIGHT: 184 LBS | HEART RATE: 64 BPM | OXYGEN SATURATION: 96 % | SYSTOLIC BLOOD PRESSURE: 118 MMHG | TEMPERATURE: 98.3 F | DIASTOLIC BLOOD PRESSURE: 85 MMHG | HEIGHT: 70 IN | RESPIRATION RATE: 18 BRPM | BODY MASS INDEX: 26.34 KG/M2

## 2018-06-22 DIAGNOSIS — F41.8 DEPRESSION WITH ANXIETY: Primary | ICD-10-CM

## 2018-06-22 RX ORDER — BUPROPION HYDROCHLORIDE 150 MG/1
150 TABLET ORAL
Qty: 30 TAB | Refills: 0 | Status: SHIPPED | OUTPATIENT
Start: 2018-06-22 | End: 2018-07-19 | Stop reason: SDUPTHER

## 2018-06-22 RX ORDER — TRAZODONE HYDROCHLORIDE 50 MG/1
50 TABLET ORAL
Qty: 30 TAB | Refills: 0 | Status: SHIPPED | OUTPATIENT
Start: 2018-06-22 | End: 2018-07-19 | Stop reason: SDUPTHER

## 2018-06-22 NOTE — LETTER
NOTIFICATION RETURN TO WORK / SCHOOL 
 
6/22/2018 11:20 AM 
 
Mr. Billie Mcgill. 
[address] [facility] [id number] To Whom It May Concern: 
 
Billie Lyn is currently under the care of [address] [place] [address],[address]. He will return to work/school on:6/25/18 If there are questions or concerns please have the patient contact our office.  
 
 
 
Sincerely, 
 
 
Anusha Mantilla MD

## 2018-06-22 NOTE — PROGRESS NOTES
HISTORY OF PRESENT ILLNESS    Chief Complaint   Patient presents with    Medication Evaluation     Reports current Lexapro dose may not be as effective, Dr Gabe Sims sent fax regarding medication       Presents for follow-up    Depression and anxiety follow-up  Severe s/s since lawsuit he filed against work where he feels he was unfairly treated at work since fall 2017. He was changed to a new job 1/2018 but feels the company retaliated while this case it ongoing. Seeing a counselor Dr Gabe Sims weekly for months. Started lexapro 10 mg daily 2/23/18. It was helping some. Patient overall feels his depression is unchanged. Current symptoms include depressed mood, anhedonia, insomnia and fatigue. Treatment includes SSRI. Patient does  see a counselor. Denies current suicidal and homicidal plan or intent. Side effects from the treatment: none. Review of Systems   All other systems reviewed and are negative, except as noted in HPI    Past Medical and Surgical History   has a past medical history of BPH with obstruction/lower urinary tract symptoms (08/2017); Chronic low back pain without sciatica; Chronic lumbar pain; Gastroesophageal reflux disease without esophagitis; GERD (gastroesophageal reflux disease); Neck pain, chronic; and Pure hypercholesterolemia. has a past surgical history that includes hx lumbar laminectomy (3/5/97); hx vasectomy (2002); hx endoscopy (11/8/10); and hx colonoscopy (10/8/07). reports that he has quit smoking. He has never used smokeless tobacco. He reports that he drinks alcohol.  family history is negative for Heart Disease, Heart Attack, Cancer, and Asthma. Physical Exam   Nursing note and vitals reviewed. Blood pressure 118/85, pulse 64, temperature 98.3 °F (36.8 °C), temperature source Oral, resp. rate 18, height 5' 10\" (1.778 m), weight 184 lb (83.5 kg), SpO2 96 %. Constitutional:  No distress.     Eyes: Conjunctivae are normal.   Ears:  Hearing grossly intact  Cardiovascular: Normal rate. regular rhythm, no murmurs or gallops  No edema  Pulmonary/Chest: Effort normal.   CTAB  Musculoskeletal: moves all 4 extremities   Neurological: Alert and oriented to person, place, and time. Skin: No rash noted. Psychiatric:  flat affect, somewhat distant. ASSESSMENT and PLAN  Diagnoses and all orders for this visit:    1. Depression with anxiety  Worsening symptoms. Taking Lexapro 10 mg for over 3 months. Situational stress is ongoing regarding a lawsuit that he accused work of discrimination of some sort. Sounds like there is a lot of backlash. It would be best to get a new job. He is thinking about it and looking. He is not really having any time to relax. He says he has to write an Affidavit this weekend  He is functioning, although I think he really could use some time off from work. He declines. Will add Wellbutrin for motivation, Depression. Continued counseling weekly as he is doing. Insomnia is also moderate to severe. Add trazodone as needed. Continue Lexapro for now. Could consider increasing Lexapro as another option, but I think adding Wellbutrin will get him more symptomatic improvement. Contracted for safety. He has a wife and son. Recommend short-term follow-up in 2-3 weeks as needed. -     buPROPion XL (WELLBUTRIN XL) 150 mg tablet; Take 1 Tab by mouth every morning.  -     traZODone (DESYREL) 50 mg tablet; Take 1 Tab by mouth nightly. Indications: insomnia associated with depression      lab results and schedule of future lab studies reviewed with patient  reviewed medications and side effects in detail  Return to clinic for further evaluation if new symptoms develop      Current Outpatient Prescriptions   Medication Sig    buPROPion XL (WELLBUTRIN XL) 150 mg tablet Take 1 Tab by mouth every morning.  traZODone (DESYREL) 50 mg tablet Take 1 Tab by mouth nightly.  Indications: insomnia associated with depression    escitalopram oxalate (LEXAPRO) 10 mg tablet Take 1 Tab by mouth daily. Indications: ANXIETY WITH DEPRESSION    tamsulosin (FLOMAX) 0.4 mg capsule Take 1 Cap by mouth daily. Indications: benign prostatic hyperplasia with lower urinary tract sx    simvastatin (ZOCOR) 20 mg tablet TAKE 1 TABLET BY MOUTH ONE TIME EVERY NIGHT     No current facility-administered medications for this visit.

## 2018-06-22 NOTE — MR AVS SNAPSHOT
[address] [facility] 
 
 
 [address] [facility] [address] [facility] 
[phone] Patient: Love Enamorado. MRN: [id number] MAN:[birth year] Visit Information Date & Time Provider Department Dept. Phone Encounter #  
 6/22/2018 10:30 AM Anna Puente MD [facility] [address] [place] [address] [id number] Follow-up Instructions Return in about 3 weeks (around 7/13/2018). Your Appointments 8/24/2018  4:00 PM  
ROUTINE CARE with Anna Puente MD  
[facility] [place] [address] [place] [address]) Appt Note: cholesterol / low back pain 6 month eval  
 [facility] [address]  
[phone]  
  
   
 [address] [facility] [address] Upcoming Health Maintenance Date Due DTaP/Tdap/Td series (1 - Tdap) 9/5/1984 COLONOSCOPY 10/8/2017 Influenza Age 5 to Adult 8/1/2018 Allergies as of 6/22/2018  Review Complete On: 6/22/2018 By: Anna Puente MD  
  
 Severity Noted Reaction Type Reactions Betadine [Povidone-iodine]  04/18/2017    Rash Lipitor [Atorvastatin]  06/20/2016    Other (comments) Nitroglycerin  04/18/2017    Other (comments) \"made his heart stop\" Current Immunizations  Reviewed on 2/23/2018 Name Date Influenza Vaccine 10/1/2015 Not reviewed this visit You Were Diagnosed With   
  
 Codes Comments Depression with anxiety    -  Primary ICD-10-CM: F41.8 ICD-9-CM: 300.4 Vitals BP Pulse Temp Resp Height(growth percentile) Weight(growth percentile) 118/85 (BP 1 Location: Left arm, BP Patient Position: Sitting) 64 98.3 °F (36.8 °C) (Oral) 18 5' 10\" (1.778 m) 184 lb (83.5 kg) SpO2 BMI Smoking Status 96% 26.4 kg/m2 Former Smoker Vitals History BMI and BSA Data Body Mass Index Body Surface Area  
 26.4 kg/m 2 2.03 m 2 Preferred Pharmacy Pharmacy Name Phone [facility], [address] [facility] [address] Your Updated Medication List  
  
   
This list is accurate as of 6/22/18 11:19 AM.  Always use your most recent med list.  
  
  
  
  
 buPROPion  mg tablet Commonly known as:  Gonzalez Alfonso Take 1 Tab by mouth every morning. escitalopram oxalate 10 mg tablet Commonly known as:  Sully Salt Take 1 Tab by mouth daily. Indications: ANXIETY WITH DEPRESSION  
  
 simvastatin 20 mg tablet Commonly known as:  ZOCOR  
TAKE 1 TABLET BY MOUTH ONE TIME EVERY NIGHT  
  
 tamsulosin 0.4 mg capsule Commonly known as:  FLOMAX Take 1 Cap by mouth daily. Indications: benign prostatic hyperplasia with lower urinary tract sx  
  
 traZODone 50 mg tablet Commonly known as:  Naeem Rupert Take 1 Tab by mouth nightly. Indications: insomnia associated with depression Prescriptions Sent to Pharmacy Refills buPROPion XL (WELLBUTRIN XL) 150 mg tablet 0 Sig: Take 1 Tab by mouth every morning. Class: Normal  
 Pharmacy: [facility] [address] [place] [address] Ph #: [phone] Route: Oral  
 traZODone (DESYREL) 50 mg tablet 0 Sig: Take 1 Tab by mouth nightly. Indications: insomnia associated with depression Class: Normal  
 Pharmacy: [facility] [address] [place] [address] Ph #: [phone] Route: Oral  
  
Follow-up Instructions Return in about 3 weeks (around 7/13/2018). Introducing [facility]! Dear Anna Del Rosario: Thank you for requesting a [facility] account. Our records indicate that you already have an active [facility] account. You can access your account anytime at [email or web address] Did you know that you can access your hospital and ER discharge instructions at any time in [facility]? You can also review all of your test results from your hospital stay or ER visit. Additional Information If you have questions, please visit the Frequently Asked Questions section of the [employer] website at [email or web address] Power [email or web address]. Access Psychiatry Solutions. [email or web address]/[email or web address]/. Remember, Nextt is NOT to be used for urgent needs. For medical emergencies, dial 911. Now available from your iPhone and Android! Please provide this summary of care documentation to your next provider. Your primary care clinician is listed as Pretty Deleon. If you have any questions after today's visit, please call [phone].

## 2018-07-13 ENCOUNTER — OFFICE VISIT (OUTPATIENT)
Dept: INTERNAL MEDICINE CLINIC | Age: 53
End: 2018-07-13

## 2018-07-13 VITALS
WEIGHT: 186 LBS | DIASTOLIC BLOOD PRESSURE: 79 MMHG | HEIGHT: 70 IN | SYSTOLIC BLOOD PRESSURE: 119 MMHG | OXYGEN SATURATION: 96 % | HEART RATE: 73 BPM | TEMPERATURE: 98.3 F | BODY MASS INDEX: 26.63 KG/M2 | RESPIRATION RATE: 18 BRPM

## 2018-07-13 DIAGNOSIS — R68.82 LOW LIBIDO: ICD-10-CM

## 2018-07-13 DIAGNOSIS — F43.25 ADJUSTMENT DISORDER WITH MIXED DISTURBANCE OF EMOTIONS AND CONDUCT: ICD-10-CM

## 2018-07-13 DIAGNOSIS — N40.1 BPH WITH OBSTRUCTION/LOWER URINARY TRACT SYMPTOMS: ICD-10-CM

## 2018-07-13 DIAGNOSIS — N13.8 BPH WITH OBSTRUCTION/LOWER URINARY TRACT SYMPTOMS: ICD-10-CM

## 2018-07-13 DIAGNOSIS — F41.8 DEPRESSION WITH ANXIETY: Primary | ICD-10-CM

## 2018-07-13 NOTE — MR AVS SNAPSHOT
[address] [facility] 
 
 
 [address] [facility] [address] [facility] 
[phone] Patient: Mali Agarwal MRN: [id number] GLX:[birth year] Visit Information Date & Time Provider Department Dept. Phone Encounter #  
 7/13/2018 10:30 AM Powell Libman, MD [facility] [id number] Your Appointments 8/24/2018  4:00 PM  
ROUTINE CARE with Powell Libman, MD  
[facility] of [address]) Appt Note: cholesterol / low back pain 6 month eval  
 [place] [facility] [address]  
[phone]  
  
   
 [address] [facility] [address] [facility] [address] Upcoming Health Maintenance Date Due DTaP/Tdap/Td series (1 - Tdap) 9/5/1984 COLONOSCOPY 10/8/2017 Influenza Age 5 to Adult 8/1/2018 Allergies as of 7/13/2018  Review Complete On: 7/13/2018 By: Powell Libman, MD  
  
 Severity Noted Reaction Type Reactions Betadine [Povidone-iodine]  04/18/2017    Rash Lipitor [Atorvastatin]  06/20/2016    Other (comments) Nitroglycerin  04/18/2017    Other (comments) \"made his heart stop\" Current Immunizations  Reviewed on 2/23/2018 Name Date Influenza Vaccine 10/1/2015 Not reviewed this visit You Were Diagnosed With   
  
 Codes Comments Depression with anxiety    -  Primary ICD-10-CM: F41.8 ICD-9-CM: 300.4 Adjustment disorder with mixed disturbance of emotions and conduct     ICD-10-CM: F43.25 ICD-9-CM: 309.4 BPH with obstruction/lower urinary tract symptoms     ICD-10-CM: N40.1, N13.8 ICD-9-CM: 600.01, 599.69 Low libido     ICD-10-CM: R68.82 
ICD-9-CM: 799.81 Vitals BP Pulse Temp Resp Height(growth percentile) Weight(growth percentile) 119/79 (BP 1 Location: Left arm, BP Patient Position: Sitting) 73 98.3 °F (36.8 °C) (Oral) 18 5' 10\" (1.778 m) 186 lb (84.4 kg) SpO2 BMI Smoking Status 96% 26.69 kg/m2 Former Smoker Vitals History BMI and BSA Data Body Mass Index Body Surface Area  
 26.69 kg/m 2 2.04 m 2 Preferred Pharmacy Pharmacy Name Phone [facility], [address] Your Updated Medication List  
  
   
This list is accurate as of 7/13/18 11:03 AM.  Always use your most recent med list.  
  
  
  
  
 buPROPion  mg tablet Commonly known as:  Lindakelsea Sanders Take 1 Tab by mouth every morning. escitalopram oxalate 10 mg tablet Commonly known as:  Betsy Cagle Take 1 Tab by mouth daily. Indications: ANXIETY WITH DEPRESSION  
  
 simvastatin 20 mg tablet Commonly known as:  ZOCOR  
TAKE 1 TABLET BY MOUTH ONE TIME EVERY NIGHT  
  
 tamsulosin 0.4 mg capsule Commonly known as:  FLOMAX Take 1 Cap by mouth daily. Indications: benign prostatic hyperplasia with lower urinary tract sx  
  
 traZODone 50 mg tablet Commonly known as:  Orma Paddy Take 1 Tab by mouth nightly. Indications: insomnia associated with depression We Performed the Following PSA W/ REFLX FREE PSA [20209 CPT(R)] TESTOSTERONE, FREE & TOTAL [17914 CPT(R)] Introducing [address]! Dear Jalen Levy: Thank you for requesting a [facility] account. Our records indicate that you already have an active [facility] account. You can access your account anytime at [email or web address] Technology Did you know that you can access your hospital and ER discharge instructions at any time in [facility]? You can also review all of your test results from your hospital stay or ER visit. Additional Information If you have questions, please visit the Frequently Asked Questions section of the [facility] website at [email or web address] [email or web address]/[email or web address] Remember, [facility] is NOT to be used for urgent needs. For medical emergencies, dial 911. Now available from your iPhone and Android! Please provide this summary of care documentation to your next provider. Your primary care clinician is listed as Holly Minor. If you have any questions after today's visit, please call [phone].

## 2018-07-13 NOTE — PROGRESS NOTES
HISTORY OF PRESENT ILLNESS    Chief Complaint   Patient presents with    Medication Evaluation       Presents for follow-up of depression. He feels \"better and different\" since. Added Wellbutrin 6/22/18 for motivation, depression. he misses dose a few times. Taking lexapro 10 mg daily still. Seeing counselor weekly. Patient says the counselor sees him getting \"back to normal\". He was taking trazodone some, but then felt it made him restless and now he is sleeping well, so is not taking it. No progress at work regarding lawsuit. \"may take years\". His new job is bearable. Reports decreased libido since before starting meds      Review of Systems   All other systems reviewed and are negative, except as noted in HPI    Past Medical and Surgical History   has a past medical history of BPH with obstruction/lower urinary tract symptoms (08/2017); Chronic low back pain without sciatica; Chronic lumbar pain; Gastroesophageal reflux disease without esophagitis; GERD (gastroesophageal reflux disease); Neck pain, chronic; and Pure hypercholesterolemia. has a past surgical history that includes hx lumbar laminectomy (3/5/97); hx vasectomy (2002); hx endoscopy (11/8/10); and hx colonoscopy (10/8/07). reports that he has quit smoking. He has never used smokeless tobacco. He reports that he drinks alcohol.  family history is negative for Heart Disease, Heart Attack, Cancer, and Asthma. Physical Exam   Nursing note and vitals reviewed. Blood pressure 119/79, pulse 73, temperature 98.3 °F (36.8 °C), temperature source Oral, resp. rate 18, height 5' 10\" (1.778 m), weight 186 lb (84.4 kg), SpO2 96 %. Constitutional:  No distress. Eyes: Conjunctivae are normal.   Ears:  Hearing grossly intact  Cardiovascular: Normal rate.   regular rhythm, no murmurs or gallops  No edema  Pulmonary/Chest: Effort normal.   CTAB  Musculoskeletal: moves all 4 extremities   Neurological: Alert and oriented to person, place, and time. Skin: No rash noted. Psychiatric: Normal mood and affect. Behavior is normal.     ASSESSMENT and PLAN  Diagnoses and all orders for this visit:    1. Depression with anxiety  Improving  Cont current meds. Wellbutrin should continue to improve. Cont counseling and recommend couples counseling    2. Adjustment disorder with mixed disturbance of emotions and conduct    3. BPH with obstruction/lower urinary tract symptoms  PSA was borderline elevated 2/2/18. Repeat in August.  Consider referral.    -     PSA W/ REFLX FREE PSA    4. Low libido  Likely due to depression  Check T. Caution w treatment given prostate concerns . lexapro also may contribute, but medications started after s/s began  -     TESTOSTERONE, FREE & TOTAL        lab results and schedule of future lab studies reviewed with patient  reviewed medications and side effects in detail    Return to clinic for further evaluation if new symptoms develop    Follow-up Disposition: Not on File    Current Outpatient Prescriptions   Medication Sig    buPROPion XL (WELLBUTRIN XL) 150 mg tablet Take 1 Tab by mouth every morning.  escitalopram oxalate (LEXAPRO) 10 mg tablet Take 1 Tab by mouth daily. Indications: ANXIETY WITH DEPRESSION    tamsulosin (FLOMAX) 0.4 mg capsule Take 1 Cap by mouth daily. Indications: benign prostatic hyperplasia with lower urinary tract sx    simvastatin (ZOCOR) 20 mg tablet TAKE 1 TABLET BY MOUTH ONE TIME EVERY NIGHT    traZODone (DESYREL) 50 mg tablet Take 1 Tab by mouth nightly. Indications: insomnia associated with depression     No current facility-administered medications for this visit.

## 2018-08-15 DIAGNOSIS — E78.00 PURE HYPERCHOLESTEROLEMIA: ICD-10-CM

## 2018-08-15 RX ORDER — SIMVASTATIN 20 MG/1
TABLET, FILM COATED ORAL
Qty: 90 TAB | Refills: 0 | Status: SHIPPED | OUTPATIENT
Start: 2018-08-15 | End: 2018-11-17 | Stop reason: SDUPTHER

## 2018-08-24 ENCOUNTER — OFFICE VISIT (OUTPATIENT)
Dept: INTERNAL MEDICINE CLINIC | Age: 53
End: 2018-08-24

## 2018-08-24 VITALS
DIASTOLIC BLOOD PRESSURE: 68 MMHG | RESPIRATION RATE: 14 BRPM | OXYGEN SATURATION: 96 % | TEMPERATURE: 97.5 F | WEIGHT: 184.2 LBS | BODY MASS INDEX: 26.37 KG/M2 | HEART RATE: 67 BPM | SYSTOLIC BLOOD PRESSURE: 105 MMHG | HEIGHT: 70 IN

## 2018-08-24 DIAGNOSIS — E78.00 PURE HYPERCHOLESTEROLEMIA: ICD-10-CM

## 2018-08-24 DIAGNOSIS — F41.8 DEPRESSION WITH ANXIETY: Primary | ICD-10-CM

## 2018-08-24 DIAGNOSIS — R68.82 LOW LIBIDO: ICD-10-CM

## 2018-08-24 DIAGNOSIS — F43.25 ADJUSTMENT DISORDER WITH MIXED DISTURBANCE OF EMOTIONS AND CONDUCT: ICD-10-CM

## 2018-08-24 LAB
ALBUMIN SERPL-MCNC: 4.5 G/DL (ref 3.5–5.5)
ALBUMIN/GLOB SERPL: 2 {RATIO} (ref 1.2–2.2)
ALP SERPL-CCNC: 69 IU/L (ref 39–117)
ALT SERPL-CCNC: 35 IU/L (ref 0–44)
AST SERPL-CCNC: 30 IU/L (ref 0–40)
BILIRUB SERPL-MCNC: 0.6 MG/DL (ref 0–1.2)
BUN SERPL-MCNC: 13 MG/DL (ref 6–24)
BUN/CREAT SERPL: 11 (ref 9–20)
CALCIUM SERPL-MCNC: 8.9 MG/DL (ref 8.7–10.2)
CHLORIDE SERPL-SCNC: 103 MMOL/L (ref 96–106)
CHOLEST SERPL-MCNC: 170 MG/DL (ref 100–199)
CO2 SERPL-SCNC: 25 MMOL/L (ref 20–29)
CREAT SERPL-MCNC: 1.14 MG/DL (ref 0.76–1.27)
GLOBULIN SER CALC-MCNC: 2.2 G/DL (ref 1.5–4.5)
GLUCOSE SERPL-MCNC: 86 MG/DL (ref 65–99)
HDLC SERPL-MCNC: 48 MG/DL
INTERPRETATION, 910389: NORMAL
LDLC SERPL CALC-MCNC: 102 MG/DL (ref 0–99)
POTASSIUM SERPL-SCNC: 4.3 MMOL/L (ref 3.5–5.2)
PROT SERPL-MCNC: 6.7 G/DL (ref 6–8.5)
PSA SERPL-MCNC: 2.8 NG/ML (ref 0–4)
REFLEX CRITERIA: NORMAL
SODIUM SERPL-SCNC: 140 MMOL/L (ref 134–144)
TRIGL SERPL-MCNC: 102 MG/DL (ref 0–149)
VLDLC SERPL CALC-MCNC: 20 MG/DL (ref 5–40)

## 2018-08-24 RX ORDER — ESCITALOPRAM OXALATE 20 MG/1
20 TABLET ORAL DAILY
Qty: 30 TAB | Refills: 1 | Status: SHIPPED | OUTPATIENT
Start: 2018-08-24 | End: 2018-10-19 | Stop reason: SDUPTHER

## 2018-08-24 NOTE — MR AVS SNAPSHOT
[facility] 
 
 
 [address] 
[phone] Patient: Akilah Haji. MRN: [id number] SXP:[birth year] Visit Information Date & Time Provider Department Dept. Phone Encounter #  
 8/24/2018  4:00 PM Alyson Andrade MD [facility] [address] [id number] Upcoming Health Maintenance Date Due DTaP/Tdap/Td series (1 - Tdap) 9/5/1984 COLONOSCOPY 10/8/2017 Influenza Age 5 to Adult 8/1/2018 Allergies as of 8/24/2018  Review Complete On: 8/24/2018 By: Messi Piña LPN Severity Noted Reaction Type Reactions Betadine [Povidone-iodine]  04/18/2017    Rash Lipitor [Atorvastatin]  06/20/2016    Other (comments) Nitroglycerin  04/18/2017    Other (comments) \"made his heart stop\" Current Immunizations  Reviewed on 2/23/2018 Name Date Influenza Vaccine 10/1/2015 Not reviewed this visit You Were Diagnosed With   
  
 Codes Comments Depression with anxiety    -  Primary ICD-10-CM: F41.8 ICD-9-CM: 300.4 Adjustment disorder with mixed disturbance of emotions and conduct     ICD-10-CM: F43.25 ICD-9-CM: 309.4 Low libido     ICD-10-CM: R68.82 
ICD-9-CM: 799.81 Vitals BP Pulse Temp Resp Height(growth percentile) Weight(growth percentile) 105/68 (BP 1 Location: Left arm, BP Patient Position: Sitting) 67 97.5 °F (36.4 °C) (Oral) 14 5' 10\" (1.778 m) 184 lb 3.2 oz (83.6 kg) SpO2 BMI Smoking Status 96% 26.43 kg/m2 Former Smoker BMI and BSA Data Body Mass Index Body Surface Area  
 26.43 kg/m 2 2.03 m 2 Preferred Pharmacy Pharmacy Name Phone [facility] [address]  Your Updated Medication List  
  
   
This list is accurate as of 8/24/18  4:31 PM.  Always use your most recent med list.  
  
  
  
  
 buPROPion  mg tablet Commonly known as:  WELLBUTRIN XL  
TAKE 1 TABLET BY MOUTH EVERY DAY IN THE MORNING  
  
 escitalopram oxalate 20 mg tablet Commonly known as:  Darcus Skillern Take 1 Tab by mouth daily. Increased dose 8/24/18  Indications: ANXIETY WITH DEPRESSION  
  
 simvastatin 20 mg tablet Commonly known as:  ZOCOR  
TAKE 1 TABLET BY MOUTH ONE TIME EVERY NIGHT  
  
 tamsulosin 0.4 mg capsule Commonly known as:  FLOMAX Take 1 Cap by mouth daily. Indications: benign prostatic hyperplasia with lower urinary tract sx  
  
 traZODone 50 mg tablet Commonly known as:  DESYREL  
TAKE 1 TABLET BY MOUTH NIGHTLY Prescriptions Sent to Pharmacy Refills  
 escitalopram oxalate (LEXAPRO) 20 mg tablet 1 Sig: Take 1 Tab by mouth daily. Increased dose 8/24/18  Indications: ANXIETY WITH DEPRESSION Class: Normal  
 Pharmacy: [facility] [id number] [place] [address] #: [phone] Route: Oral  
  
We Performed the Following TESTOSTERONE, FREE & TOTAL [69133 CPT(R)] Introducing [facility] & HEALTH SERVICES! Dear Shelby Medina: Thank you for requesting a [employer] account. Our records indicate that you already have an active [employer] account. You can access your account anytime at [email or web address]. [email or web address] Did you know that you can access your hospital and ER discharge instructions at any time in [employer]? You can also review all of your test results from your hospital stay or ER visit. Additional Information If you have questions, please visit the Frequently Asked Questions section of the [employer] website at [email or web address] Remember, [employer] is NOT to be used for urgent needs. For medical emergencies, dial 911. Now available from your iPhone and Android! Please provide this summary of care documentation to your next provider. Your primary care clinician is listed as Elliot Navarrete.  If you have any questions after today's visit, please call [phone].

## 2018-08-26 NOTE — PROGRESS NOTES
HISTORY OF PRESENT ILLNESS    Chief Complaint   Patient presents with    Cholesterol Problem       Presents for follow-up    Presents for 6 week follow-up of depression. Added Wellbutrin 6/22/18 for motivation, depression  Taking lexapro 10 mg daily  Seeing counselor weekly. Patient says the counselor sees him getting \"back to normal\". He was taking trazodone prn insomni. No progress at work regarding lawsuit. \"may take years\". His new job is bearable, but he is \"isolated so I dont have to work with anyone\". He says \"this is my punishment\"  Reports decreased libido since before starting meds    Hyperlipidemia  ROS: taking medications as instructed, no medication side effects noted  No new myalgias, no joint pains, no weakness  No TIA's, no chest pain on exertion, no dyspnea on exertion, no swelling of ankles. Lab Results   Component Value Date/Time    Cholesterol, total 170 08/23/2018 08:48 AM    HDL Cholesterol 48 08/23/2018 08:48 AM    LDL, calculated 102 (H) 08/23/2018 08:48 AM    VLDL, calculated 20 08/23/2018 08:48 AM    Triglyceride 102 08/23/2018 08:48 AM       Review of Systems   All other systems reviewed and are negative, except as noted in HPI    Past Medical and Surgical History   has a past medical history of Adjustment disorder with mixed disturbance of emotions and conduct (7/13/2018); BPH with obstruction/lower urinary tract symptoms (08/2017); Chronic low back pain without sciatica; Chronic lumbar pain; Gastroesophageal reflux disease without esophagitis; GERD (gastroesophageal reflux disease); Neck pain, chronic; and Pure hypercholesterolemia. has a past surgical history that includes hx lumbar laminectomy (3/5/97); hx vasectomy (2002); hx endoscopy (11/8/10); and hx colonoscopy (10/8/07). reports that he has quit smoking.  He has never used smokeless tobacco. He reports that he drinks alcohol.  family history is negative for Heart Disease, Heart Attack, Cancer, and Asthma. Physical Exam   Nursing note and vitals reviewed. Blood pressure 105/68, pulse 67, temperature 97.5 °F (36.4 °C), temperature source Oral, resp. rate 14, height 5' 10\" (1.778 m), weight 184 lb 3.2 oz (83.6 kg), SpO2 96 %. Constitutional:  No distress. Eyes: Conjunctivae are normal.   Ears:  Hearing grossly intact  Cardiovascular: Normal rate. regular rhythm, no murmurs or gallops  No edema  Pulmonary/Chest: Effort normal.   CTAB  Musculoskeletal: moves all 4 extremities   Neurological: Alert and oriented to person, place, and time. Skin: No rash noted. Psychiatric: blunted affect, appears worried. ASSESSMENT and PLAN  Diagnoses and all orders for this visit:    1. Depression with anxiety  2. Adjustment disorder with mixed disturbance of emotions and conduct  Depression s/s are uncontrolled still  Will increase lexapro to 20 mg. In 1 month, if s/s remain uncontrolled, will change to Trintellix 1/2 of 10 mg pill and wean back other meds  -     escitalopram oxalate (LEXAPRO) 20 mg tablet; Take 1 Tab by mouth daily. Increased dose 8/24/18  Indications: ANXIETY WITH DEPRESSION    3. Low libido- add on lab from this week. May be med side effects or depression   -     TESTOSTERONE, FREE & TOTAL    4. Pure hypercholesterolemia        There are no Patient Instructions on file for this visit.    lab results and schedule of future lab studies reviewed with patient  reviewed medications and side effects in detail    Return to clinic for further evaluation if new symptoms develop    Follow-up Disposition: Not on File    Current Outpatient Prescriptions   Medication Sig    escitalopram oxalate (LEXAPRO) 20 mg tablet Take 1 Tab by mouth daily.  Increased dose 8/24/18  Indications: ANXIETY WITH DEPRESSION    simvastatin (ZOCOR) 20 mg tablet TAKE 1 TABLET BY MOUTH ONE TIME EVERY NIGHT    buPROPion XL (WELLBUTRIN XL) 150 mg tablet TAKE 1 TABLET BY MOUTH EVERY DAY IN THE MORNING    traZODone (DESYREL) 50 mg tablet TAKE 1 TABLET BY MOUTH NIGHTLY    tamsulosin (FLOMAX) 0.4 mg capsule Take 1 Cap by mouth daily. Indications: benign prostatic hyperplasia with lower urinary tract sx     No current facility-administered medications for this visit.

## 2018-08-28 LAB
SPECIMEN STATUS REPORT, ROLRST: NORMAL
TESTOST FREE SERPL-MCNC: 4.6 PG/ML (ref 7.2–24)
TESTOST SERPL-MCNC: 385 NG/DL (ref 264–916)

## 2018-09-24 ENCOUNTER — OFFICE VISIT (OUTPATIENT)
Dept: INTERNAL MEDICINE CLINIC | Age: 53
End: 2018-09-24

## 2018-09-24 VITALS
DIASTOLIC BLOOD PRESSURE: 76 MMHG | SYSTOLIC BLOOD PRESSURE: 113 MMHG | HEIGHT: 70 IN | HEART RATE: 77 BPM | TEMPERATURE: 98.1 F | BODY MASS INDEX: 26.48 KG/M2 | OXYGEN SATURATION: 97 % | RESPIRATION RATE: 18 BRPM | WEIGHT: 185 LBS

## 2018-09-24 DIAGNOSIS — F43.25 ADJUSTMENT DISORDER WITH MIXED DISTURBANCE OF EMOTIONS AND CONDUCT: Primary | ICD-10-CM

## 2018-09-24 DIAGNOSIS — Z12.11 SCREEN FOR COLON CANCER: ICD-10-CM

## 2018-09-24 DIAGNOSIS — Z23 ENCOUNTER FOR IMMUNIZATION: ICD-10-CM

## 2018-09-24 DIAGNOSIS — R79.89 LOW TESTOSTERONE IN MALE: ICD-10-CM

## 2018-09-24 NOTE — PROGRESS NOTES
HISTORY OF PRESENT ILLNESS    Chief Complaint   Patient presents with    Depression     f/o to med change    Immunization/Injection     Flu vac       Presents for follow-up    Presents for 4 week follow-up of depression. Added Wellbutrin 6/22/18 for motivation, depression  Increased lexpro to 20 mg 8/24/18  Seeing counselor bi-weekly. He IS NOT taking trazodone prn insomnia  No progress at work regarding lawsuit. \"may take years\". He feels is \"leveling off\"  He missed work once last week due to feeling down. Rested. Reports:  Some fatigue, also with decreased libido (since before start med)    Review of Systems   All other systems reviewed and are negative, except as noted in HPI    Past Medical and Surgical History   has a past medical history of Adjustment disorder with mixed disturbance of emotions and conduct (7/13/2018); BPH with obstruction/lower urinary tract symptoms (08/2017); Chronic low back pain without sciatica; Chronic lumbar pain; Gastroesophageal reflux disease without esophagitis; GERD (gastroesophageal reflux disease); Neck pain, chronic; and Pure hypercholesterolemia. has a past surgical history that includes hx lumbar laminectomy (3/5/97); hx vasectomy (2002); hx endoscopy (11/8/10); and hx colonoscopy (10/8/07). reports that he has quit smoking. He has never used smokeless tobacco. He reports that he drinks alcohol.  family history is negative for Heart Disease, Heart Attack, Cancer, and Asthma. Physical Exam   Nursing note and vitals reviewed. Blood pressure 113/76, pulse 77, temperature 98.1 °F (36.7 °C), temperature source Oral, resp. rate 18, height 5' 10\" (1.778 m), weight 185 lb (83.9 kg), SpO2 97 %. Constitutional:  No distress. Eyes: Conjunctivae are normal.   Ears:  Hearing grossly intact  Cardiovascular: Normal rate.   regular rhythm, no murmurs or gallops  No edema  Pulmonary/Chest: Effort normal.   CTAB  Musculoskeletal: moves all 4 extremities Neurological: Alert and oriented to person, place, and time. Skin: No rash noted. Psychiatric: blunted affect, appears worried. ASSESSMENT and PLAN  Diagnoses and all orders for this visit:    1. Depression with anxiety  2. Adjustment disorder with mixed disturbance of emotions and conduct  Improving control. Mild now. Continue counseling  Working full time. 3 month f/u  Consider Trintellix as next option    lab results and schedule of future lab studies reviewed with patient  reviewed medications and side effects in detail  Return to clinic for further evaluation if new symptoms develop      Current Outpatient Prescriptions   Medication Sig    escitalopram oxalate (LEXAPRO) 20 mg tablet Take 1 Tab by mouth daily. Increased dose 8/24/18  Indications: ANXIETY WITH DEPRESSION    simvastatin (ZOCOR) 20 mg tablet TAKE 1 TABLET BY MOUTH ONE TIME EVERY NIGHT    buPROPion XL (WELLBUTRIN XL) 150 mg tablet TAKE 1 TABLET BY MOUTH EVERY DAY IN THE MORNING    traZODone (DESYREL) 50 mg tablet TAKE 1 TABLET BY MOUTH NIGHTLY    tamsulosin (FLOMAX) 0.4 mg capsule Take 1 Cap by mouth daily. Indications: benign prostatic hyperplasia with lower urinary tract sx     No current facility-administered medications for this visit.

## 2018-09-24 NOTE — MR AVS SNAPSHOT
[facility] 
 
 
 [address] 
[phone] Patient: Mali Agarwal MRN: [id number] INY:[birth year] Visit Information Date & Time Provider Department Dept. Phone Encounter #  
 9/24/2018  2:15 PM Emerson Akhtar MD [facility] [address] [facility] [id number] Upcoming Health Maintenance Date Due DTaP/Tdap/Td series (1 - Tdap) 9/5/1984 Shingrix Vaccine Age 50> (1 of 2) 9/5/2013 COLONOSCOPY 11/24/2018* *Topic was postponed. The date shown is not the original due date. Allergies as of 9/24/2018  Review Complete On: 9/24/2018 By: Emerson Akhtar MD  
  
 Severity Noted Reaction Type Reactions Betadine [Povidone-iodine]  04/18/2017    Rash Lipitor [Atorvastatin]  06/20/2016    Other (comments) Nitroglycerin  04/18/2017    Other (comments) \"made his heart stop\" Current Immunizations  Reviewed on 9/24/2018 Name Date Influenza Vaccine 10/1/2015 Influenza Vaccine (Quad) PF 9/24/2018 Reviewed by Emerson Akhtar MD on 9/24/2018 at  2:44 PM  
You Were Diagnosed With   
  
 Codes Comments Adjustment disorder with mixed disturbance of emotions and conduct    -  Primary ICD-10-CM: F43.25 ICD-9-CM: 309.4 Encounter for immunization     ICD-10-CM: O42 ICD-9-CM: V03.89 Low testosterone in male     ICD-10-CM: R79.89 ICD-9-CM: 790.99 Screen for colon cancer     ICD-10-CM: Z12.11 ICD-9-CM: V76.51 Vitals BP Pulse Temp Resp Height(growth percentile) Weight(growth percentile) 113/76 (BP 1 Location: Left arm, BP Patient Position: Sitting) 77 98.1 °F (36.7 °C) (Oral) 18 5' 10\" (1.778 m) 185 lb (83.9 kg) SpO2 BMI Smoking Status 97% 26.54 kg/m2 Former Smoker Vitals History BMI and BSA Data Body Mass Index Body Surface Area  
 26.54 kg/m 2 2.04 m 2 Preferred Pharmacy Pharmacy Name Phone [facility], [address] [facility] [address] Your Updated Medication List  
  
   
This list is accurate as of 9/24/18  2:46 PM.  Always use your most recent med list.  
  
  
  
  
 buPROPion  mg tablet Commonly known as:  WELLBUTRIN XL  
TAKE 1 TABLET BY MOUTH EVERY DAY IN THE MORNING  
  
 escitalopram oxalate 20 mg tablet Commonly known as:  Anna Skates Take 1 Tab by mouth daily. Increased dose 8/24/18  Indications: ANXIETY WITH DEPRESSION  
  
 simvastatin 20 mg tablet Commonly known as:  ZOCOR  
TAKE 1 TABLET BY MOUTH ONE TIME EVERY NIGHT  
  
 tamsulosin 0.4 mg capsule Commonly known as:  FLOMAX Take 1 Cap by mouth daily. Indications: benign prostatic hyperplasia with lower urinary tract sx  
  
 traZODone 50 mg tablet Commonly known as:  DESYREL  
TAKE 1 TABLET BY MOUTH NIGHTLY We Performed the Following INFLUENZA VIRUS VAC QUAD,SPLIT,PRESV FREE SYRINGE IM [id number] CPT(R)] REFERRAL TO GASTROENTEROLOGY [BAW84 Custom] TESTOSTERONE, FREE & TOTAL [73442 CPT(R)] Referral Information Referral ID Referred By Referred To  
  
 [id number] [place] [address] Phone: [phone] Fax: [phone] Visits Status Start Date End Date 1 New Request 9/24/18 9/24/19 If your referral has a status of pending review or denied, additional information will be sent to support the outcome of this decision. Introducing [facility] & HEALTH SERVICES! Dear Viry Stafford: Thank you for requesting a [employer] account. Our records indicate that you already have an active [employer] account. You can access your account anytime at [email or web address] Did you know that you can access your hospital and ER discharge instructions at any time in [employer]? You can also review all of your test results from your hospital stay or ER visit. Additional Information If you have questions, please visit the Frequently Asked Questions section of the [employer] website at [email or web address] Remember, [employer] is NOT to be used for urgent needs. For medical emergencies, dial 911. Now available from your iPhone and Android! Please provide this summary of care documentation to your next provider. Your primary care clinician is listed as Mateusz Triana. If you have any questions after today's visit, please call [phone].

## 2018-10-19 DIAGNOSIS — F41.8 DEPRESSION WITH ANXIETY: ICD-10-CM

## 2018-10-19 RX ORDER — ESCITALOPRAM OXALATE 20 MG/1
20 TABLET ORAL DAILY
Qty: 30 TAB | Refills: 1 | Status: SHIPPED | OUTPATIENT
Start: 2018-10-19 | End: 2018-12-05

## 2018-11-09 LAB
TESTOST FREE SERPL-MCNC: 6.6 PG/ML (ref 7.2–24)
TESTOST SERPL-MCNC: 387 NG/DL (ref 264–916)

## 2018-11-17 DIAGNOSIS — F41.8 DEPRESSION WITH ANXIETY: ICD-10-CM

## 2018-11-17 DIAGNOSIS — N13.8 BPH WITH OBSTRUCTION/LOWER URINARY TRACT SYMPTOMS: ICD-10-CM

## 2018-11-17 DIAGNOSIS — N40.1 BPH WITH OBSTRUCTION/LOWER URINARY TRACT SYMPTOMS: ICD-10-CM

## 2018-11-18 RX ORDER — ESCITALOPRAM OXALATE 10 MG/1
TABLET ORAL
Qty: 90 TAB | Refills: 1 | Status: SHIPPED | OUTPATIENT
Start: 2018-11-18 | End: 2018-12-05 | Stop reason: ALTCHOICE

## 2018-11-18 RX ORDER — TAMSULOSIN HYDROCHLORIDE 0.4 MG/1
CAPSULE ORAL
Qty: 90 CAP | Refills: 1 | Status: SHIPPED | OUTPATIENT
Start: 2018-11-18 | End: 2018-12-05 | Stop reason: ALTCHOICE

## 2018-12-05 ENCOUNTER — OFFICE VISIT (OUTPATIENT)
Dept: INTERNAL MEDICINE CLINIC | Age: 53
End: 2018-12-05

## 2018-12-05 VITALS
TEMPERATURE: 98.1 F | DIASTOLIC BLOOD PRESSURE: 72 MMHG | BODY MASS INDEX: 26.63 KG/M2 | RESPIRATION RATE: 18 BRPM | OXYGEN SATURATION: 96 % | HEIGHT: 70 IN | HEART RATE: 75 BPM | WEIGHT: 186 LBS | SYSTOLIC BLOOD PRESSURE: 104 MMHG

## 2018-12-05 DIAGNOSIS — F43.25 ADJUSTMENT DISORDER WITH MIXED DISTURBANCE OF EMOTIONS AND CONDUCT: ICD-10-CM

## 2018-12-05 DIAGNOSIS — N40.1 BPH WITH OBSTRUCTION/LOWER URINARY TRACT SYMPTOMS: ICD-10-CM

## 2018-12-05 DIAGNOSIS — R79.89 DECREASED FREE TESTOSTERONE LEVEL IN MALE: Primary | ICD-10-CM

## 2018-12-05 DIAGNOSIS — F41.8 DEPRESSION WITH ANXIETY: ICD-10-CM

## 2018-12-05 DIAGNOSIS — R68.82 LIBIDO, DECREASED: ICD-10-CM

## 2018-12-05 DIAGNOSIS — N13.8 BPH WITH OBSTRUCTION/LOWER URINARY TRACT SYMPTOMS: ICD-10-CM

## 2018-12-05 RX ORDER — BUPROPION HYDROCHLORIDE 300 MG/1
300 TABLET ORAL
Qty: 30 TAB | Refills: 2 | Status: SHIPPED | OUTPATIENT
Start: 2018-12-05 | End: 2019-02-04 | Stop reason: SDUPTHER

## 2018-12-05 RX ORDER — ESCITALOPRAM OXALATE 10 MG/1
10 TABLET ORAL DAILY
Qty: 30 TAB | Refills: 2 | Status: SHIPPED | OUTPATIENT
Start: 2018-12-05 | End: 2019-02-04 | Stop reason: SDUPTHER

## 2018-12-05 NOTE — PROGRESS NOTES
HISTORY OF PRESENT ILLNESS Presents for follow-up Depression Adjustment disorder. He is seeing his counselor every other week. Feels there is not much to talk about anymore. He is still working and has an ongoing lawsuit regarding his prior treatment at work. He is not very interested in finding a different job because he does not want to move his family. Has looked locally without any good options. Reports that he is He is taking Lexapro 20 mg daily, bupropion 150 mg daily, trazodone 50 mg at night. Falling asleep reasonably well but his sleep has not been very deep. He does not feel that he gets into a heavy sleep. Reports ongoing relatively significant fatigue. Poor motivation. He retreats often and spends time by himself when he is at work and at home. Reports to occasionally napping while at work. Asked for any additional treatments that might make him happy. Feels that he is going through the motions with life and not enjoying anything. Denies any suicidal or homicidal ideation. Reports that his wife does not talk about it with him very much. They interact but did not have much fun together. 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 12year-old son. Hypogonadism Free testosterone is low. Reports no interest in sex or libido so does not care about having low T. He does Lab Results Component Value Date/Time Testosterone 387 11/08/2018 08:37 AM  
 
Urine flow is still not strong. This is ongoing and moderate. Saw urology Dr. Otoniel Jenkins in 2017. Patient declined medication therapy at that time. PSA and rectal exam were normal. 
Some hesitancy and dribbling. Taking flomax with mild improvement 1-2 x nocturia Lab Results Component Value Date/Time  
 Prostate Specific Ag 2.8 08/23/2018 08:48 AM  
 Prostate Specific Ag 4.0 02/22/2018 08:45 AM  
 Prostate Specific Ag 2.9 05/27/2016 08:21 AM  
 % Free PSA 14.8 02/22/2018 08:45 AM  
 
 
 
Review of Systems All other systems reviewed and are negative, except as noted in HPI Past Medical and Surgical History 
 has a past medical history of Adjustment disorder with mixed disturbance of emotions and conduct, BPH with obstruction/lower urinary tract symptoms, Chronic low back pain without sciatica, Chronic lumbar pain, Gastroesophageal reflux disease without esophagitis, GERD (gastroesophageal reflux disease), Neck pain, chronic, and Pure hypercholesterolemia. has a past surgical history that includes hx lumbar laminectomy (3/5/97); hx vasectomy (2002); hx endoscopy (11/8/10); and hx colonoscopy (10/8/07). reports that he has quit smoking. he has never used smokeless tobacco. He reports that he drinks alcohol. He reports that he does not use drugs. family history is not on file. Physical Exam  
Nursing note and vitals reviewed. Blood pressure 104/72, pulse 75, temperature 98.1 °F (36.7 °C), temperature source Oral, resp. rate 18, height 5' 10\" (1.778 m), weight 186 lb (84.4 kg), SpO2 96 %. Constitutional:  No distress. Eyes: Conjunctivae are normal.  
Ears:  Hearing grossly intact Cardiovascular: Normal rate. regular rhythm, no murmurs or gallops No edema Pulmonary/Chest: Effort normal.   CTAB Musculoskeletal: moves all 4 extremities Neurological: Alert and oriented to person, place, and time. Skin: No rash noted. Psychiatric: Normal mood and affect. Behavior is normal.  
 
ASSESSMENT and PLAN Diagnoses and all orders for this visit: 1. Decreased free testosterone level in male Discussed possibility of treating testosterone daily. He declines treatment at this time citing that he would not be interested in having libido even if he could increase it. Symptoms of low T began prior to starting Lexapro, but SSRI certainly may exacerbate low libido symptoms. We will work on weaning back SSRI for now. Consider treatment at a time. Risk of worsening prostate symptoms or irritability with treatment, but irritability may actually be improved with treatment as well. 2. Libido, decreased Severe. Depression, medications, low T all contributing. 3. Depression with anxiety 4. Adjustment disorder with mixed disturbance of emotions and conduct Depressive symptoms are still increase Wellbutrin with the target improving libido and fatigue and may be perhaps helping achieve better sleep patterns. Increase Lexapro with the same goals. Work on Wellbutrin first for 1-2 weeks, then decrease Lexapro. Side effects discussed. Follow-up in 6-8 weeks. Consider transitioning to Trintellix. -     buPROPion XL (WELLBUTRIN XL) 300 mg XL tablet; Take 1 Tab by mouth every morning. Increased dose 12/5/18 
-     escitalopram oxalate (LEXAPRO) 10 mg tablet; Take 1 Tab by mouth daily. Decreased dose 12/5/18 Ayla Tate 5. BPH with obstruction/lower urinary tract symptoms Ongoing moderate symptoms. PSA has been stable Flomax does not help. Recommend further urologic evaluation for other medication therapies or treatments. 
-     REFERRAL TO UROLOGY 
 
 
 
 
 
lab results and schedule of future lab studies reviewed with patient 
reviewed medications and side effects in detail Return to clinic for further evaluation if new symptoms develop Follow-up Disposition: Not on File Current Outpatient Medications Medication Sig  
 buPROPion XL (WELLBUTRIN XL) 300 mg XL tablet Take 1 Tab by mouth every morning. Increased dose 12/5/18  escitalopram oxalate (LEXAPRO) 10 mg tablet Take 1 Tab by mouth daily. Decreased dose 12/5/18  simvastatin (ZOCOR) 20 mg tablet TAKE 1 TABLET BY MOUTH ONE TIME EVERY NIGHT  traZODone (DESYREL) 50 mg tablet TAKE 1 TABLET BY MOUTH NIGHTLY No current facility-administered medications for this visit. Discussed the patient's BMI with him. The BMI follow up plan is as follows:  
 
dietary management education, guidance, and counseling 
encourage exercise 
monitor weight 
prescribed dietary intake An After Visit Summary was printed and given to the patient.

## 2018-12-05 NOTE — PATIENT INSTRUCTIONS
Body Mass Index: Care Instructions Your Care Instructions Body mass index (BMI) can help you see if your weight is raising your risk for health problems. It uses a formula to compare how much you weigh with how tall you are. · A BMI lower than 18.5 is considered underweight. · A BMI between 18.5 and 24.9 is considered healthy. · A BMI between 25 and 29.9 is considered overweight. A BMI of 30 or higher is considered obese. If your BMI is in the normal range, it means that you have a lower risk for weight-related health problems. If your BMI is in the overweight or obese range, you may be at increased risk for weight-related health problems, such as high blood pressure, heart disease, stroke, arthritis or joint pain, and diabetes. If your BMI is in the underweight range, you may be at increased risk for health problems such as fatigue, lower protection (immunity) against illness, muscle loss, bone loss, hair loss, and hormone problems. BMI is just one measure of your risk for weight-related health problems. You may be at higher risk for health problems if you are not active, you eat an unhealthy diet, or you drink too much alcohol or use tobacco products. Follow-up care is a key part of your treatment and safety. Be sure to make and go to all appointments, and call your doctor if you are having problems. It's also a good idea to know your test results and keep a list of the medicines you take. How can you care for yourself at home? · Practice healthy eating habits. This includes eating plenty of fruits, vegetables, whole grains, lean protein, and low-fat dairy. · If your doctor recommends it, get more exercise. Walking is a good choice. Bit by bit, increase the amount you walk every day. Try for at least 30 minutes on most days of the week. · Do not smoke. Smoking can increase your risk for health problems.  If you need help quitting, talk to your doctor about stop-smoking programs and medicines. These can increase your chances of quitting for good. · Limit alcohol to 2 drinks a day for men and 1 drink a day for women. Too much alcohol can cause health problems. If you have a BMI higher than 25 · Your doctor may do other tests to check your risk for weight-related health problems. This may include measuring the distance around your waist. A waist measurement of more than 40 inches in men or 35 inches in women can increase the risk of weight-related health problems. · Talk with your doctor about steps you can take to stay healthy or improve your health. You may need to make lifestyle changes to lose weight and stay healthy, such as changing your diet and getting regular exercise. If you have a BMI lower than 18.5 · Your doctor may do other tests to check your risk for health problems. · Talk with your doctor about steps you can take to stay healthy or improve your health. You may need to make lifestyle changes to gain or maintain weight and stay healthy, such as getting more healthy foods in your diet and doing exercises to build muscle. Where can you learn more? Go to http://joelle-lee.info/. Enter S176 in the search box to learn more about \"Body Mass Index: Care Instructions. \" Current as of: October 13, 2016 Content Version: 11.4 © 3669-7375 [employer]. Care instructions adapted under license by [employer] (which disclaims liability or warranty for this information). If you have questions about a medical condition or this instruction, always ask your healthcare professional. [address] any warranty or liability for your use of this information.

## 2018-12-05 NOTE — PROGRESS NOTES
Sugar Johnson. is a 52 y.o. male Chief Complaint Patient presents with  Cholesterol Problem  
  follow up  Labs 1. Have you been to the ER, urgent care clinic since your last visit? Hospitalized since your last visit? No 
M 
2. Have you seen or consulted any other health care providers outside of the [address] [place] [address] since your last visit? Include any pap smears or colon screening. No 
 
 
Visit Vitals /72 (BP 1 Location: Left arm, BP Patient Position: Sitting) Pulse 75 Temp 98.1 °F (36.7 °C) (Oral) Resp 18 Ht 5' 10\" (1.778 m) Wt 186 lb (84.4 kg) SpO2 96% BMI 26.69 kg/m² Health Maintenance Due Topic Date Due  
 DTaP/Tdap/Td series (1 - Tdap) 09/05/1984  Shingrix Vaccine Age 50> (1 of 2) 09/05/2013  COLONOSCOPY  10/08/2017 Medication Reconciliation completed, changes noted.   Please  Update medication list.

## 2019-02-04 ENCOUNTER — OFFICE VISIT (OUTPATIENT)
Dept: INTERNAL MEDICINE CLINIC | Age: 54
End: 2019-02-04

## 2019-02-04 VITALS
SYSTOLIC BLOOD PRESSURE: 110 MMHG | DIASTOLIC BLOOD PRESSURE: 75 MMHG | WEIGHT: 189.13 LBS | HEIGHT: 70 IN | BODY MASS INDEX: 27.08 KG/M2 | TEMPERATURE: 98.5 F | HEART RATE: 84 BPM | RESPIRATION RATE: 18 BRPM | OXYGEN SATURATION: 95 %

## 2019-02-04 DIAGNOSIS — F41.8 DEPRESSION WITH ANXIETY: Primary | ICD-10-CM

## 2019-02-04 DIAGNOSIS — F43.25 ADJUSTMENT DISORDER WITH MIXED DISTURBANCE OF EMOTIONS AND CONDUCT: ICD-10-CM

## 2019-02-04 RX ORDER — ALFUZOSIN HYDROCHLORIDE 10 MG/1
10 TABLET, EXTENDED RELEASE ORAL DAILY
Qty: 30 TAB | Refills: 5 | Status: SHIPPED | OUTPATIENT
Start: 2019-02-04 | End: 2020-02-24

## 2019-02-04 RX ORDER — ALFUZOSIN HYDROCHLORIDE 10 MG/1
10 TABLET, EXTENDED RELEASE ORAL DAILY
COMMUNITY
Start: 2019-02-04 | End: 2019-02-04 | Stop reason: SDUPTHER

## 2019-02-04 RX ORDER — BUPROPION HYDROCHLORIDE 300 MG/1
300 TABLET ORAL
Qty: 30 TAB | Refills: 5 | Status: SHIPPED | OUTPATIENT
Start: 2019-02-04 | End: 2019-03-25

## 2019-02-04 RX ORDER — ESCITALOPRAM OXALATE 10 MG/1
10 TABLET ORAL DAILY
Qty: 30 TAB | Refills: 2 | Status: SHIPPED | OUTPATIENT
Start: 2019-02-04 | End: 2019-03-25

## 2019-02-04 NOTE — PROGRESS NOTES
HISTORY OF PRESENT ILLNESS Presents for follow-up Saw urology. Tried a new medication alfuzosin and urinary stream is better. Not waking at night to urinate. Reports PSA and UA ok Lab Results Component Value Date/Time  
 Prostate Specific Ag 2.8 08/23/2018 08:48 AM  
 Prostate Specific Ag 4.0 02/22/2018 08:45 AM  
 Prostate Specific Ag 2.9 05/27/2016 08:21 AM  
 % Free PSA 14.8 02/22/2018 08:45 AM  
 
 
Depression Adjustment disorder f/u We decreased Lexapro to 10 mg daily, increased bupropion to 300 mg daily, trazodone 50 mg at night. Feels less fatigued, less waking (also w urinary issue better). He is seeing his counselor every other week. Counselor does not think more medication is needed. He is still working and has an ongoing lawsuit regarding his prior treatment at work. He is not very interested in finding a different job because he does not want to move his family. He says the issue at work will [employer] be comfortable\". Does not want to socialize. This bothers his wife. Reports low ibido Denies any suicidal or homicidal ideation. Reports that his wife does not talk about it with him very much. Review of Systems All other systems reviewed and are negative, except as noted in HPI Past Medical and Surgical History 
 has a past medical history of Adjustment disorder with mixed disturbance of emotions and conduct (7/13/2018), BPH with obstruction/lower urinary tract symptoms (08/2017), Chronic low back pain without sciatica, Chronic lumbar pain, Gastroesophageal reflux disease without esophagitis, GERD (gastroesophageal reflux disease), Neck pain, chronic, and Pure hypercholesterolemia. has a past surgical history that includes hx lumbar laminectomy (3/5/97); hx vasectomy (2002); hx endoscopy (11/8/10); and hx colonoscopy (10/8/07). reports that he has quit smoking.  he has never used smokeless tobacco. He reports that he drinks alcohol. He reports that he does not use drugs. family history is not on file. Physical Exam  
Nursing note and vitals reviewed. Blood pressure 110/75, pulse 84, temperature 98.5 °F (36.9 °C), temperature source Oral, resp. rate 18, height 5' 10\" (1.778 m), weight 189 lb 2 oz (85.8 kg), SpO2 95 %. Constitutional:  No distress. Eyes: Conjunctivae are normal.  
Ears:  Hearing grossly intact Cardiovascular: Normal rate. regular rhythm, no murmurs or gallops No edema Pulmonary/Chest: Effort normal.   CTAB Musculoskeletal: moves all 4 extremities Neurological: Alert and oriented to person, place, and time. Skin: No rash noted. Psychiatric: blunted affect ASSESSMENT and PLAN Diagnoses and all orders for this visit: 
 
Diagnoses and all orders for this visit: 1. Depression with anxiety 2. Adjustment disorder with mixed disturbance of emotions and conduct He is overall stable and somewhat improved compared to 6 months ago, but he is still not at his baseline. He does have isolating behaviors and does not wish to have any social life which is frustrating for him and for his wife. He is probably less likely to have severe irritability in a social setting now that he is on these medications, but we cannot rule that out. He is continuing counseling. His ongoing stress at the job that causes stress which she will not leave certainly makes this challenging to treat. Could consider other medication options such as Trintellix. I have also given him some information on seeing a psychiatrist, given Dr. Cassandra Wu number. I have also given him information about [facility] therapy. He will let me know if he would like a referral. 
-     buPROPion XL (WELLBUTRIN XL) 300 mg XL tablet; Take 1 Tab by mouth every morning. Increased dose 12/5/18 
-     escitalopram oxalate (LEXAPRO) 10 mg tablet; Take 1 Tab by mouth daily. Decreased dose 12/5/18 Other orders -     alfuzosin SR (UROXATRAL) 10 mg SR tablet; Take 1 Tab by mouth daily. lab results and schedule of future lab studies reviewed with patient 
reviewed medications and side effects in detail Return to clinic for further evaluation if new symptoms develop Follow-up Disposition: Not on File Current Outpatient Medications Medication Sig  
 alfuzosin SR (UROXATRAL) 10 mg SR tablet Take 10 mg by mouth daily.  buPROPion XL (WELLBUTRIN XL) 300 mg XL tablet Take 1 Tab by mouth every morning. Increased dose 12/5/18  escitalopram oxalate (LEXAPRO) 10 mg tablet Take 1 Tab by mouth daily. Decreased dose 12/5/18  simvastatin (ZOCOR) 20 mg tablet TAKE 1 TABLET BY MOUTH ONE TIME EVERY NIGHT  traZODone (DESYREL) 50 mg tablet TAKE 1 TABLET BY MOUTH NIGHTLY No current facility-administered medications for this visit.

## 2019-03-25 ENCOUNTER — APPOINTMENT (OUTPATIENT)
Dept: GENERAL RADIOLOGY | Age: 54
End: 2019-03-25
Attending: EMERGENCY MEDICINE
Payer: COMMERCIAL

## 2019-03-25 ENCOUNTER — HOSPITAL ENCOUNTER (EMERGENCY)
Age: 54
Discharge: HOME | End: 2019-03-26
Attending: EMERGENCY MEDICINE | Admitting: EMERGENCY MEDICINE
Payer: COMMERCIAL

## 2019-03-25 DIAGNOSIS — J11.1 INFLUENZA-LIKE ILLNESS: ICD-10-CM

## 2019-03-25 DIAGNOSIS — R07.89 ATYPICAL CHEST PAIN: ICD-10-CM

## 2019-03-25 DIAGNOSIS — R05.9 COUGH: Primary | ICD-10-CM

## 2019-03-25 PROCEDURE — 74011250637 HC RX REV CODE- 250/637: Performed by: EMERGENCY MEDICINE

## 2019-03-25 PROCEDURE — 93005 ELECTROCARDIOGRAM TRACING: CPT

## 2019-03-25 PROCEDURE — 99283 EMERGENCY DEPT VISIT LOW MDM: CPT

## 2019-03-25 PROCEDURE — 71046 X-RAY EXAM CHEST 2 VIEWS: CPT

## 2019-03-25 RX ORDER — IBUPROFEN 600 MG/1
600 TABLET ORAL
Status: COMPLETED | OUTPATIENT
Start: 2019-03-25 | End: 2019-03-25

## 2019-03-25 RX ADMIN — IBUPROFEN 600 MG: 600 TABLET, FILM COATED ORAL at 23:55

## 2019-03-26 VITALS
WEIGHT: 195.77 LBS | DIASTOLIC BLOOD PRESSURE: 87 MMHG | TEMPERATURE: 97.4 F | BODY MASS INDEX: 28.03 KG/M2 | HEIGHT: 70 IN | RESPIRATION RATE: 20 BRPM | SYSTOLIC BLOOD PRESSURE: 124 MMHG | OXYGEN SATURATION: 99 % | HEART RATE: 82 BPM

## 2019-03-26 LAB
ATRIAL RATE: 67 BPM
CALCULATED P AXIS, ECG09: 27 DEGREES
CALCULATED R AXIS, ECG10: 11 DEGREES
CALCULATED T AXIS, ECG11: 41 DEGREES
DIAGNOSIS, 93000: NORMAL
P-R INTERVAL, ECG05: 170 MS
Q-T INTERVAL, ECG07: 350 MS
QRS DURATION, ECG06: 74 MS
QTC CALCULATION (BEZET), ECG08: 369 MS
VENTRICULAR RATE, ECG03: 67 BPM

## 2019-03-26 PROCEDURE — 74011250637 HC RX REV CODE- 250/637: Performed by: EMERGENCY MEDICINE

## 2019-03-26 RX ORDER — BENZONATATE 100 MG/1
100 CAPSULE ORAL
Qty: 30 CAP | Refills: 0 | Status: SHIPPED | OUTPATIENT
Start: 2019-03-26 | End: 2019-04-02

## 2019-03-26 RX ORDER — BENZONATATE 100 MG/1
100 CAPSULE ORAL
Status: COMPLETED | OUTPATIENT
Start: 2019-03-26 | End: 2019-03-26

## 2019-03-26 RX ADMIN — BENZONATATE 100 MG: 100 CAPSULE ORAL at 00:21

## 2019-03-26 NOTE — ED NOTES
Discharge note: The patient was discharged home in stable condition. The patient is alert and oriented, is in no respiratory distress and has vital signs within normal limits. The patient's diagnosis, condition and treatment were explained to patient by Dr Swati Stoner reinforced by nurse. The patient expressed understanding of discharge instructions, prescriptions, and plan of care. A discharge plan has been developed. A  was not involved in the process. Patient offered a wheelchair to ED lobby for discharge but declined at this time. Patient ambulatory to ED lobby to go home.

## 2019-03-26 NOTE — ED PROVIDER NOTES
Anish Ryan is a 53 yo M with cough and pain in his bilateral lower chest wall. He first started with cough and subjective fevers 4 days ago. Cough productive of clear white sputum. Today he started to feel pain in his lower ribs when he coughed. His son was ill with influenza last week. He denies nasuea or vomiting. Past Medical History:  
Diagnosis Date  Adjustment disorder with mixed disturbance of emotions and conduct 7/13/2018  BPH with obstruction/lower urinary tract symptoms 08/2017  
 saw urology 8/2017  Chronic low back pain without sciatica  Chronic lumbar pain   
 saw Dr. Cleopatra Porras; MRI 6/2016, 2012, s/p lumbar laminectomy 2007  Gastroesophageal reflux disease without esophagitis  GERD (gastroesophageal reflux disease)  Neck pain, chronic   
 seeing jillian Sims. Mild canal stenosis at C5-C6 with moderate left and mild right foraminal    
 Pure hypercholesterolemia Past Surgical History:  
Procedure Laterality Date  HX COLONOSCOPY  10/8/07  
 normal, hemorrohoids  HX ENDOSCOPY  11/8/10  
 mild gastritis  HX LUMBAR LAMINECTOMY  3/5/97 L5- S1 w L5 L disc extrusion  in [place]  
 HX VASECTOMY  2002 Family History:  
Problem Relation Age of Onset  Heart Disease Neg Hx   
 Heart Attack Neg Hx  Cancer Neg Hx  Asthma Neg Hx Social History Socioeconomic History  Marital status:  Spouse name: Ricke Skiff  Number of children: 1so  Years of education: Not on file  Highest education level: Not on file Occupational History  Occupation: [employer] Social Needs  Financial resource strain: Not on file  Food insecurity:  
  Worry: Not on file Inability: Not on file  Transportation needs:  
  Medical: Not on file Non-medical: Not on file Tobacco Use  Smoking status: Former Smoker  Smokeless tobacco: Never Used Substance and Sexual Activity  Alcohol use: Yes Comment: 1-4 per month  Drug use: No  
 Sexual activity: Yes Lifestyle  Physical activity:  
  Days per week: Not on file Minutes per session: Not on file  Stress: Not on file Relationships  Social connections:  
  Talks on phone: Not on file Gets together: Not on file Attends Mosque service: Not on file Active member of club or organization: Not on file Attends meetings of clubs or organizations: Not on file Relationship status: Not on file  Intimate partner violence:  
  Fear of current or ex partner: Not on file Emotionally abused: Not on file Physically abused: Not on file Forced sexual activity: Not on file Other Topics Concern  Not on file Social History Narrative 1 son, age 12 (10/2018) ALLERGIES: Betadine [povidone-iodine]; Lipitor [atorvastatin]; and Nitroglycerin Review of Systems Constitutional: Negative for fever. HENT: Negative for sore throat. Eyes: Negative for visual disturbance. Respiratory: Positive for cough. Negative for shortness of breath. Cardiovascular: Positive for chest pain (bilateral lower chest). Gastrointestinal: Negative for abdominal pain. Genitourinary: Negative for dysuria. Musculoskeletal: Negative for back pain. Skin: Negative for rash. Neurological: Negative for headaches. Vitals:  
 03/25/19 2329 BP: 111/76 Pulse: 83 Resp: 16 Temp: 97.4 °F (36.3 °C) SpO2: 97% Weight: 88.8 kg (195 lb 12.3 oz) Height: 5' 10\" (1.778 m) Physical Exam  
Constitutional: He appears well-developed and well-nourished. No distress. HENT:  
Head: Normocephalic and atraumatic. Mouth/Throat: Oropharynx is clear and moist.  
Eyes: Conjunctivae and EOM are normal.  
Neck: Normal range of motion and phonation normal.  
Cardiovascular: Normal rate and regular rhythm. Pulmonary/Chest: Effort normal. No stridor. No respiratory distress.  He has no wheezes. He has no rhonchi. Abdominal: He exhibits no distension. Musculoskeletal: Normal range of motion. He exhibits no tenderness. Neurological: He is alert. He is not disoriented. He exhibits normal muscle tone. Skin: Skin is warm, dry and intact. Capillary refill takes less than 2 seconds. Nursing note and vitals reviewed. MDM Procedures

## 2019-03-26 NOTE — ED TRIAGE NOTES
Pt. Said cough started on Friday and has progressed. Productive, thick, unsure of color. No blood that he noticed. Starting today he feels pain in back and ribcage when coughing.

## 2019-03-26 NOTE — DISCHARGE INSTRUCTIONS
Patient Education        Cough: Care Instructions  Your Care Instructions    A cough is your body's response to something that bothers your throat or airways. Many things can cause a cough. You might cough because of a cold or the flu, bronchitis, or asthma. Smoking, postnasal drip, allergies, and stomach acid that backs up into your throat also can cause coughs. A cough is a symptom, not a disease. Most coughs stop when the cause, such as a cold, goes away. You can take a few steps at home to cough less and feel better. Follow-up care is a key part of your treatment and safety. Be sure to make and go to all appointments, and call your doctor if you are having problems. It's also a good idea to know your test results and keep a list of the medicines you take. How can you care for yourself at home? · Drink lots of water and other fluids. This helps thin the mucus and soothes a dry or sore throat. Honey or lemon juice in hot water or tea may ease a dry cough. · Take cough medicine as directed by your doctor. · Prop up your head on pillows to help you breathe and ease a dry cough. · Try cough drops to soothe a dry or sore throat. Cough drops don't stop a cough. Medicine-flavored cough drops are no better than candy-flavored drops or hard candy. · Do not smoke. Avoid secondhand smoke. If you need help quitting, talk to your doctor about stop-smoking programs and medicines. These can increase your chances of quitting for good. When should you call for help? Call 911 anytime you think you may need emergency care.  For example, call if:    · You have severe trouble breathing.    Call your doctor now or seek immediate medical care if:    · You cough up blood.     · You have new or worse trouble breathing.     · You have a new or higher fever.     · You have a new rash.    Watch closely for changes in your health, and be sure to contact your doctor if:    · You cough more deeply or more often, especially if you notice more mucus or a change in the color of your mucus.     · You have new symptoms, such as a sore throat, an earache, or sinus pain.     · You do not get better as expected. Where can you learn more? Go to http://joelle-lee.info/. Enter D279 in the search box to learn more about \"Cough: Care Instructions. \"  Current as of: September 5, 2018  Content Version: 11.9  © 2368-4450 [employer]. Care instructions adapted under license by [employer] (which disclaims liability or warranty for this information). If you have questions about a medical condition or this instruction, always ask your healthcare professional. [address] any warranty or liability for your use of this information. Patient Education        Chest Pain: Care Instructions  Your Care Instructions    There are many things that can cause chest pain. Some are not serious and will get better on their own in a few days. But some kinds of chest pain need more testing and treatment. Your doctor may have recommended a follow-up visit in the next 8 to 12 hours. If you are not getting better, you may need more tests or treatment. Even though your doctor has released you, you still need to watch for any problems. The doctor carefully checked you, but sometimes problems can develop later. If you have new symptoms or if your symptoms do not get better, get medical care right away. If you have worse or different chest pain or pressure that lasts more than 5 minutes or you passed out (lost consciousness), call 911 or seek other emergency help right away. A medical visit is only one step in your treatment. Even if you feel better, you still need to do what your doctor recommends, such as going to all suggested follow-up appointments and taking medicines exactly as directed. This will help you recover and help prevent future problems. How can you care for yourself at home?   · Rest until you feel better. · Take your medicine exactly as prescribed. Call your doctor if you think you are having a problem with your medicine. · Do not drive after taking a prescription pain medicine. When should you call for help? Call 911 if:    · You passed out (lost consciousness).     · You have severe difficulty breathing.     · You have symptoms of a heart attack. These may include:  ? Chest pain or pressure, or a strange feeling in your chest.  ? Sweating. ? Shortness of breath. ? Nausea or vomiting. ? Pain, pressure, or a strange feeling in your back, neck, jaw, or upper belly or in one or both shoulders or arms. ? Lightheadedness or sudden weakness. ? A fast or irregular heartbeat. After you call 911, the  may tell you to chew 1 adult-strength or 2 to 4 low-dose aspirin. Wait for an ambulance. Do not try to drive yourself.    Call your doctor today if:    · You have any trouble breathing.     · Your chest pain gets worse.     · You are dizzy or lightheaded, or you feel like you may faint.     · You are not getting better as expected.     · You are having new or different chest pain. Where can you learn more? Go to http://joelle-lee.info/. Enter A120 in the search box to learn more about \"Chest Pain: Care Instructions. \"  Current as of: September 23, 2018  Content Version: 11.9  © 3323-5701 [employer]. Care instructions adapted under license by [employer] (which disclaims liability or warranty for this information). If you have questions about a medical condition or this instruction, always ask your healthcare professional. [address] any warranty or liability for your use of this information.

## 2019-03-27 ENCOUNTER — PATIENT OUTREACH (OUTPATIENT)
Dept: INTERNAL MEDICINE CLINIC | Age: 54
End: 2019-03-27

## 2019-03-27 NOTE — PROGRESS NOTES
Patient on discharge report dated 3/26/19 from [facility]. Diagnosis Comment Cough Atypical chest pain Influenza-like illness Attempted to leave a voicemail, mailbox full. Will attempt to contact again. Need to complete post-discharge assessment.

## 2019-05-16 DIAGNOSIS — E78.00 PURE HYPERCHOLESTEROLEMIA: ICD-10-CM

## 2019-05-16 RX ORDER — SIMVASTATIN 20 MG/1
TABLET, FILM COATED ORAL
Qty: 90 TAB | Refills: 1 | Status: SHIPPED | OUTPATIENT
Start: 2019-05-16 | End: 2019-11-13 | Stop reason: SDUPTHER

## 2019-07-25 ENCOUNTER — TELEPHONE (OUTPATIENT)
Dept: INTERNAL MEDICINE CLINIC | Age: 54
End: 2019-07-25

## 2019-09-24 ENCOUNTER — OFFICE VISIT (OUTPATIENT)
Dept: INTERNAL MEDICINE CLINIC | Age: 54
End: 2019-09-24

## 2019-09-24 VITALS
DIASTOLIC BLOOD PRESSURE: 71 MMHG | TEMPERATURE: 97.9 F | WEIGHT: 184 LBS | RESPIRATION RATE: 18 BRPM | HEIGHT: 70 IN | HEART RATE: 72 BPM | SYSTOLIC BLOOD PRESSURE: 103 MMHG | OXYGEN SATURATION: 95 % | BODY MASS INDEX: 26.34 KG/M2

## 2019-09-24 DIAGNOSIS — N40.1 BPH WITH OBSTRUCTION/LOWER URINARY TRACT SYMPTOMS: ICD-10-CM

## 2019-09-24 DIAGNOSIS — N13.8 BPH WITH OBSTRUCTION/LOWER URINARY TRACT SYMPTOMS: ICD-10-CM

## 2019-09-24 DIAGNOSIS — Z23 ENCOUNTER FOR IMMUNIZATION: ICD-10-CM

## 2019-09-24 DIAGNOSIS — R53.82 CHRONIC FATIGUE: ICD-10-CM

## 2019-09-24 DIAGNOSIS — R79.89 LOW TESTOSTERONE IN MALE: ICD-10-CM

## 2019-09-24 DIAGNOSIS — F41.8 DEPRESSION WITH ANXIETY: ICD-10-CM

## 2019-09-24 DIAGNOSIS — Z12.11 SCREENING FOR COLON CANCER: ICD-10-CM

## 2019-09-24 DIAGNOSIS — E78.00 PURE HYPERCHOLESTEROLEMIA: Primary | ICD-10-CM

## 2019-09-24 RX ORDER — VENLAFAXINE HYDROCHLORIDE 150 MG/1
300 TABLET, EXTENDED RELEASE ORAL DAILY
COMMUNITY
End: 2020-10-26 | Stop reason: ALTCHOICE

## 2019-09-24 NOTE — PATIENT INSTRUCTIONS
Body Mass Index: Care Instructions Your Care Instructions Body mass index (BMI) can help you see if your weight is raising your risk for health problems. It uses a formula to compare how much you weigh with how tall you are. · A BMI lower than 18.5 is considered underweight. · A BMI between 18.5 and 24.9 is considered healthy. · A BMI between 25 and 29.9 is considered overweight. A BMI of 30 or higher is considered obese. If your BMI is in the normal range, it means that you have a lower risk for weight-related health problems. If your BMI is in the overweight or obese range, you may be at increased risk for weight-related health problems, such as high blood pressure, heart disease, stroke, arthritis or joint pain, and diabetes. If your BMI is in the underweight range, you may be at increased risk for health problems such as fatigue, lower protection (immunity) against illness, muscle loss, bone loss, hair loss, and hormone problems. BMI is just one measure of your risk for weight-related health problems. You may be at higher risk for health problems if you are not active, you eat an unhealthy diet, or you drink too much alcohol or use tobacco products. Follow-up care is a key part of your treatment and safety. Be sure to make and go to all appointments, and call your doctor if you are having problems. It's also a good idea to know your test results and keep a list of the medicines you take. How can you care for yourself at home? · Practice healthy eating habits. This includes eating plenty of fruits, vegetables, whole grains, lean protein, and low-fat dairy. · If your doctor recommends it, get more exercise. Walking is a good choice. Bit by bit, increase the amount you walk every day. Try for at least 30 minutes on most days of the week. · Do not smoke. Smoking can increase your risk for health problems.  If you need help quitting, talk to your doctor about stop-smoking programs and medicines. These can increase your chances of quitting for good. · Limit alcohol to 2 drinks a day for men and 1 drink a day for women. Too much alcohol can cause health problems. If you have a BMI higher than 25 · Your doctor may do other tests to check your risk for weight-related health problems. This may include measuring the distance around your waist. A waist measurement of more than 40 inches in men or 35 inches in women can increase the risk of weight-related health problems. · Talk with your doctor about steps you can take to stay healthy or improve your health. You may need to make lifestyle changes to lose weight and stay healthy, such as changing your diet and getting regular exercise. If you have a BMI lower than 18.5 · Your doctor may do other tests to check your risk for health problems. · Talk with your doctor about steps you can take to stay healthy or improve your health. You may need to make lifestyle changes to gain or maintain weight and stay healthy, such as getting more healthy foods in your diet and doing exercises to build muscle. Where can you learn more? Go to http://joelle-lee.info/. Enter S176 in the search box to learn more about \"Body Mass Index: Care Instructions. \" Current as of: October 13, 2016 Content Version: 11.4 © 0263-9306 [employer]. Care instructions adapted under license by [employer] (which disclaims liability or warranty for this information). If you have questions about a medical condition or this instruction, always ask your healthcare professional. [address] any warranty or liability for your use of this information.

## 2019-09-24 NOTE — PROGRESS NOTES
HISTORY OF PRESENT ILLNESS    Presents for follow-up    Back pains. Arm pain. Mild. No changes. Not really concerned about this today. Depression  Reports ongoing s/s. Says \" I am just not going to be any fun to be around. \"  Seeing psych now Dr. Stanley Larose and counselor. Recently changed to a new counselor as recommended by Dr. Manny Gonzalez. He is working. He may start day treatment program soon  Increased venlafaxine to 150 mg 2 days ago. Taking trazodone hs. He is sleeping well. Denies suicidal or homicidal ideation. Hyperlipidemia  Currently he takes simvastatin 20 mg  ROS: taking medications as instructed, no medication side effects noted  No new myalgias, no joint pains, no weakness  No TIA's, no chest pain on exertion, no dyspnea on exertion, no swelling of ankles. Lab Results   Component Value Date/Time    Cholesterol, total 170 08/23/2018 08:48 AM    HDL Cholesterol 48 08/23/2018 08:48 AM    LDL, calculated 102 (H) 08/23/2018 08:48 AM    VLDL, calculated 20 08/23/2018 08:48 AM    Triglyceride 102 08/23/2018 08:48 AM         Review of Systems   All other systems reviewed and are negative, except as noted in HPI    Past Medical and Surgical History   has a past medical history of Adjustment disorder with mixed disturbance of emotions and conduct (7/13/2018), BPH with obstruction/lower urinary tract symptoms (08/2017), Chronic low back pain without sciatica, Chronic lumbar pain, GERD (gastroesophageal reflux disease), Neck pain, chronic, and Pure hypercholesterolemia. has a past surgical history that includes hx lumbar laminectomy (3/5/97); hx vasectomy (2002); hx endoscopy (11/8/10); and hx colonoscopy (10/8/07). reports that he has quit smoking. He has never used smokeless tobacco. He reports that he drinks alcohol. He reports that he does not use drugs. family history is not on file. Physical Exam   Nursing note and vitals reviewed.   Blood pressure 103/71, pulse 72, temperature 97.9 °F (36.6 °C), temperature source Oral, resp. rate 18, height 5' 10\" (1.778 m), weight 184 lb (83.5 kg), SpO2 95 %. Constitutional:  No distress. Eyes: Conjunctivae are normal.   Ears:  Hearing grossly intact  Cardiovascular: Normal rate. regular rhythm, no murmurs or gallops  No edema  Pulmonary/Chest: Effort normal.   CTAB  Musculoskeletal: moves all 4 extremities   Neurological: Alert and oriented to person, place, and time. Skin: No rash noted. Psychiatric: Normal mood and affect. Behavior is normal.     ASSESSMENT and PLAN  Diagnoses and all orders for this visit:    1. Pure hypercholesterolemia  Likely well controlled on simvastatin. -     METABOLIC PANEL, COMPREHENSIVE  -     LIPID PANEL    2. Chronic fatigue  Depression is clearly a big precipitant, but however I think we might need to address his low testosterone issue. He has low libido, low energy, depressive symptoms. Would recommend a trial of Clomid 50 mg 3 times daily for low T if confirmed to be low still. He agrees. -     CBC WITH AUTOMATED DIFF  -     TESTOSTERONE, FREE+TOTAL  -     TSH 3RD GENERATION    3. Depression with anxiety   severe, refractory. Will address testosterone as a possible adjunctive therapy. Follow-up with psychiatry and with counselor. Perhaps TMS would be helpful? 4. Low testosterone in male  As above. He is symptomatic. Consider therapy w clomid  Program    5. BPH with obstruction/lower urinary tract symptoms  Mild symptoms. Would monitor start testosterone therapy. PSA has been normal    6. Screening for colon cancer  -     REFERRAL TO GASTROENTEROLOGY    7. Encounter for immunization  -     NV IMMUNIZ ADMIN,1 SINGLE/COMB VAC/TOXOID  -     INFLUENZA VIRUS VAC QUAD,SPLIT,PRESV FREE SYRINGE IM        Patient Instructions          Body Mass Index: Care Instructions  Your Care Instructions    Body mass index (BMI) can help you see if your weight is raising your risk for health problems.  It uses a formula to compare how much you weigh with how tall you are. · A BMI lower than 18.5 is considered underweight. · A BMI between 18.5 and 24.9 is considered healthy. · A BMI between 25 and 29.9 is considered overweight. A BMI of 30 or higher is considered obese. If your BMI is in the normal range, it means that you have a lower risk for weight-related health problems. If your BMI is in the overweight or obese range, you may be at increased risk for weight-related health problems, such as high blood pressure, heart disease, stroke, arthritis or joint pain, and diabetes. If your BMI is in the underweight range, you may be at increased risk for health problems such as fatigue, lower protection (immunity) against illness, muscle loss, bone loss, hair loss, and hormone problems. BMI is just one measure of your risk for weight-related health problems. You may be at higher risk for health problems if you are not active, you eat an unhealthy diet, or you drink too much alcohol or use tobacco products. Follow-up care is a key part of your treatment and safety. Be sure to make and go to all appointments, and call your doctor if you are having problems. It's also a good idea to know your test results and keep a list of the medicines you take. How can you care for yourself at home? · Practice healthy eating habits. This includes eating plenty of fruits, vegetables, whole grains, lean protein, and low-fat dairy. · If your doctor recommends it, get more exercise. Walking is a good choice. Bit by bit, increase the amount you walk every day. Try for at least 30 minutes on most days of the week. · Do not smoke. Smoking can increase your risk for health problems. If you need help quitting, talk to your doctor about stop-smoking programs and medicines. These can increase your chances of quitting for good. · Limit alcohol to 2 drinks a day for men and 1 drink a day for women. Too much alcohol can cause health problems.   If you have a BMI higher than 25  · Your doctor may do other tests to check your risk for weight-related health problems. This may include measuring the distance around your waist. A waist measurement of more than 40 inches in men or 35 inches in women can increase the risk of weight-related health problems. · Talk with your doctor about steps you can take to stay healthy or improve your health. You may need to make lifestyle changes to lose weight and stay healthy, such as changing your diet and getting regular exercise. If you have a BMI lower than 18.5  · Your doctor may do other tests to check your risk for health problems. · Talk with your doctor about steps you can take to stay healthy or improve your health. You may need to make lifestyle changes to gain or maintain weight and stay healthy, such as getting more healthy foods in your diet and doing exercises to build muscle. Where can you learn more? Go to http://joelleTripsourcinglee.info/. Enter S176 in the search box to learn more about \"Body Mass Index: Care Instructions. \"  Current as of: October 13, 2016  Content Version: 11.4  © 9679-2608 [employer]. Care instructions adapted under license by [employer] (which disclaims liability or warranty for this information). If you have questions about a medical condition or this instruction, always ask your healthcare professional. [address] any warranty or liability for your use of this information. lab results and schedule of future lab studies reviewed with patient  reviewed medications and side effects in detail    Return to clinic for further evaluation if new symptoms develop        Current Outpatient Medications   Medication Sig    Venlafaxine 150 mg tr24 Take  by mouth.  simvastatin (ZOCOR) 20 mg tablet TAKE 1 TABLET BY MOUTH ONE TIME EVERY NIGHT    alfuzosin SR (UROXATRAL) 10 mg SR tablet Take 1 Tab by mouth daily.     traZODone (DESYREL) 50 mg tablet TAKE 1 TABLET BY MOUTH NIGHTLY     No current facility-administered medications for this visit.

## 2019-10-02 LAB
ALBUMIN SERPL-MCNC: 4.7 G/DL (ref 3.5–5.5)
ALBUMIN/GLOB SERPL: 2 {RATIO} (ref 1.2–2.2)
ALP SERPL-CCNC: 67 IU/L (ref 39–117)
ALT SERPL-CCNC: 28 IU/L (ref 0–44)
AST SERPL-CCNC: 27 IU/L (ref 0–40)
BASOPHILS # BLD AUTO: 0 X10E3/UL (ref 0–0.2)
BASOPHILS NFR BLD AUTO: 1 %
BILIRUB SERPL-MCNC: 0.6 MG/DL (ref 0–1.2)
BUN SERPL-MCNC: 16 MG/DL (ref 6–24)
BUN/CREAT SERPL: 14 (ref 9–20)
CALCIUM SERPL-MCNC: 9.3 MG/DL (ref 8.7–10.2)
CHLORIDE SERPL-SCNC: 103 MMOL/L (ref 96–106)
CHOLEST SERPL-MCNC: 175 MG/DL (ref 100–199)
CO2 SERPL-SCNC: 24 MMOL/L (ref 20–29)
CREAT SERPL-MCNC: 1.13 MG/DL (ref 0.76–1.27)
EOSINOPHIL # BLD AUTO: 0.1 X10E3/UL (ref 0–0.4)
EOSINOPHIL NFR BLD AUTO: 2 %
ERYTHROCYTE [DISTWIDTH] IN BLOOD BY AUTOMATED COUNT: 12.1 % (ref 12.3–15.4)
GLOBULIN SER CALC-MCNC: 2.3 G/DL (ref 1.5–4.5)
GLUCOSE SERPL-MCNC: 89 MG/DL (ref 65–99)
HCT VFR BLD AUTO: 44 % (ref 37.5–51)
HDLC SERPL-MCNC: 49 MG/DL
HGB BLD-MCNC: 15.5 G/DL (ref 13–17.7)
IMM GRANULOCYTES # BLD AUTO: 0 X10E3/UL (ref 0–0.1)
IMM GRANULOCYTES NFR BLD AUTO: 0 %
INTERPRETATION, 910389: NORMAL
LDLC SERPL CALC-MCNC: 104 MG/DL (ref 0–99)
LYMPHOCYTES # BLD AUTO: 2 X10E3/UL (ref 0.7–3.1)
LYMPHOCYTES NFR BLD AUTO: 42 %
MCH RBC QN AUTO: 31.9 PG (ref 26.6–33)
MCHC RBC AUTO-ENTMCNC: 35.2 G/DL (ref 31.5–35.7)
MCV RBC AUTO: 91 FL (ref 79–97)
MONOCYTES # BLD AUTO: 0.6 X10E3/UL (ref 0.1–0.9)
MONOCYTES NFR BLD AUTO: 13 %
NEUTROPHILS # BLD AUTO: 1.9 X10E3/UL (ref 1.4–7)
NEUTROPHILS NFR BLD AUTO: 42 %
PLATELET # BLD AUTO: 233 X10E3/UL (ref 150–450)
POTASSIUM SERPL-SCNC: 4.3 MMOL/L (ref 3.5–5.2)
PROT SERPL-MCNC: 7 G/DL (ref 6–8.5)
RBC # BLD AUTO: 4.86 X10E6/UL (ref 4.14–5.8)
SODIUM SERPL-SCNC: 144 MMOL/L (ref 134–144)
TESTOST FREE SERPL-MCNC: 6.3 PG/ML (ref 7.2–24)
TESTOST SERPL-MCNC: 444.3 NG/DL (ref 264–916)
TRIGL SERPL-MCNC: 111 MG/DL (ref 0–149)
TSH SERPL DL<=0.005 MIU/L-ACNC: 2.78 UIU/ML (ref 0.45–4.5)
VLDLC SERPL CALC-MCNC: 22 MG/DL (ref 5–40)
WBC # BLD AUTO: 4.6 X10E3/UL (ref 3.4–10.8)

## 2019-11-13 DIAGNOSIS — E78.00 PURE HYPERCHOLESTEROLEMIA: ICD-10-CM

## 2019-11-13 RX ORDER — SIMVASTATIN 20 MG/1
TABLET, FILM COATED ORAL
Qty: 90 TAB | Refills: 1 | Status: SHIPPED | OUTPATIENT
Start: 2019-11-13 | End: 2020-05-15 | Stop reason: SDUPTHER

## 2019-11-18 ENCOUNTER — OFFICE VISIT (OUTPATIENT)
Dept: INTERNAL MEDICINE CLINIC | Age: 54
End: 2019-11-18

## 2019-11-18 VITALS
OXYGEN SATURATION: 96 % | HEART RATE: 82 BPM | BODY MASS INDEX: 25.2 KG/M2 | TEMPERATURE: 98.3 F | RESPIRATION RATE: 18 BRPM | WEIGHT: 176 LBS | HEIGHT: 70 IN | DIASTOLIC BLOOD PRESSURE: 82 MMHG | SYSTOLIC BLOOD PRESSURE: 129 MMHG

## 2019-11-18 DIAGNOSIS — R79.89 DECREASED FREE TESTOSTERONE LEVEL IN MALE: ICD-10-CM

## 2019-11-18 DIAGNOSIS — N40.1 BPH WITH OBSTRUCTION/LOWER URINARY TRACT SYMPTOMS: ICD-10-CM

## 2019-11-18 DIAGNOSIS — F41.8 DEPRESSION WITH ANXIETY: ICD-10-CM

## 2019-11-18 DIAGNOSIS — E78.00 PURE HYPERCHOLESTEROLEMIA: Primary | ICD-10-CM

## 2019-11-18 DIAGNOSIS — N13.8 BPH WITH OBSTRUCTION/LOWER URINARY TRACT SYMPTOMS: ICD-10-CM

## 2019-11-18 RX ORDER — ZOLPIDEM TARTRATE 10 MG/1
TABLET ORAL
Refills: 5 | COMMUNITY
Start: 2019-11-12 | End: 2020-03-18 | Stop reason: ALTCHOICE

## 2019-11-18 RX ORDER — POLYETHYLENE GLYCOL-3350, SODIUM CHLORIDE, POTASSIUM CHLORIDE AND SODIUM BICARBONATE 420; 11.2; 5.72; 1.48 G/438.4G; G/438.4G; G/438.4G; G/438.4G
POWDER, FOR SOLUTION ORAL
Refills: 0 | COMMUNITY
Start: 2019-10-21 | End: 2019-11-18 | Stop reason: ALTCHOICE

## 2019-11-18 NOTE — PROGRESS NOTES
HISTORY OF PRESENT ILLNESS    Chief Complaint   Patient presents with    Results     colonoscopy    Cholesterol Problem     started simva       Presents for follow-up    Colonoscopy 10/24 4 mm polyp    Depression. Seeing Dr. Armani Gallagher and counselor. In Outpatient Treatment since 6 days ago, on sick leave for this. Taking venlafaxine which he does not feel it is enough. Taking ambien. Denies SI or HI.      BPH. Seeing Dr. Rabia Knight. \"Overall he feels he is voiding well. We rechecked his urine today to ensure he is draining to completion and he is. He is generally content with Uroxatrol. His PSA is relatively stable at 2.96. We discussed the nature of BPH in general. I outlined in diagram for him different treatment options including no intervention, versus additional medication or even multiple different surgeries we could consider. He understands that our next step would be cystoscopy for clarification. He believes he is content currently on his medication alone. Should his symptoms deteriorate he will let me know. Tentatively he will see me back in 9 to 12 months. 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 has a past surgical history that includes hx lumbar laminectomy (3/5/97); hx vasectomy (2002); hx endoscopy (11/8/10); and hx colonoscopy (10/8/07). reports that he has quit smoking. He has never used smokeless tobacco. He reports that he drinks alcohol. He reports that he does not use drugs. family history is not on file. Physical Exam   Nursing note and vitals reviewed.   Blood pressure 129/82, pulse 82, temperature 98.3 °F (36.8 °C), temperature source Oral, resp. rate 18, height 5' 10\" (1.778 m), weight 176 lb (79.8 kg), SpO2 96 %. Constitutional:  No distress. Eyes: Conjunctivae are normal.   Ears:  Hearing grossly intact  Cardiovascular: Normal rate. regular rhythm, no murmurs or gallops  No edema  Pulmonary/Chest: Effort normal.   CTAB  Musculoskeletal: moves all 4 extremities   Neurological: Alert and oriented to person, place, and time. Skin: No rash noted. Psychiatric: Normal mood and affect. Behavior is normal.     ASSESSMENT and PLAN  Diagnoses and all orders for this visit:    1. Pure hypercholesterolemia  Controlled on current regimen. Continue current medications as written in chart. 2. Depression with anxiety  Refractory. Underlying PD possible. Seeing psychiatry. 3. BPH with obstruction/lower urinary tract symptoms  Are relatively stable. Following up with urology. Eventually procedure may be needed. 4. Decreased free testosterone level in male  Discussed how I do not feel additional treatment will necessarily be beneficial.  His total testosterone level is relatively good. Increasing testosterone levels may increase his PSA, prostate symptoms and increase other side effects. lab results and schedule of future lab studies reviewed with patient  reviewed medications and side effects in detail    Return to clinic for further evaluation if new symptoms develop        Current Outpatient Medications   Medication Sig    zolpidem (AMBIEN) 10 mg tablet TAKE 1/2 TO 1 TABLET BY MOUTH NIGHTLY AT BEDTIME AS NEEDED FOR INSOMNIA    simvastatin (ZOCOR) 20 mg tablet TAKE 1 TABLET BY MOUTH ONE TIME EVERY NIGHT    Venlafaxine 150 mg tr24 Take  by mouth.  alfuzosin SR (UROXATRAL) 10 mg SR tablet Take 1 Tab by mouth daily.  GAVILYTE-N 420 gram solution TAKE AS DIRECTED     No current facility-administered medications for this visit.

## 2020-02-24 RX ORDER — ALFUZOSIN HYDROCHLORIDE 10 MG/1
TABLET, EXTENDED RELEASE ORAL
Qty: 90 TAB | Refills: 1 | Status: SHIPPED | OUTPATIENT
Start: 2020-02-24 | End: 2020-08-05

## 2020-03-17 ENCOUNTER — TELEPHONE (OUTPATIENT)
Dept: INTERNAL MEDICINE CLINIC | Age: 55
End: 2020-03-17

## 2020-03-17 NOTE — TELEPHONE ENCOUNTER
Nurse spoke with patient who reports recent onset of dizziness with 3 falls which patient thinks is medication related. Patient denies hitting his head with falls. Patient has communicated with PCP via [address], but patient has declined PCP's recommendation to go to the ER & instead patient has requested an office visit. Appointment made with PCP for Wednesday March 18, 2020 at 2:40pm. Patient denies fever, denies cough, denies flu-like symptoms, denies recent international & domestic travel. Patient states that his wife will be bringing him to the office. Nurse informed patient of the precautions that the office is taking to help protect the 'well' patients coming in for an appointment, but nurse explained that there is always risk of coronavirus exposure when entering a medical facility. Patient verbalized understanding & appreciation of assistance.

## 2020-03-17 NOTE — TELEPHONE ENCOUNTER
[facility] states [facility] has been experiencing dizziness for the past few days. He fell 3 times and she believes it is due to his medications. She would like to speak with a nurse or PCP as pt does not want to come into the office due to COVID-19. Thanks.

## 2020-03-18 ENCOUNTER — HOSPITAL ENCOUNTER (OUTPATIENT)
Dept: LAB | Age: 55
Discharge: HOME | End: 2020-03-18

## 2020-03-18 ENCOUNTER — OFFICE VISIT (OUTPATIENT)
Dept: INTERNAL MEDICINE CLINIC | Age: 55
End: 2020-03-18

## 2020-03-18 VITALS
OXYGEN SATURATION: 92 % | RESPIRATION RATE: 14 BRPM | DIASTOLIC BLOOD PRESSURE: 71 MMHG | BODY MASS INDEX: 23.94 KG/M2 | HEART RATE: 84 BPM | HEIGHT: 70 IN | TEMPERATURE: 99 F | WEIGHT: 167.2 LBS | SYSTOLIC BLOOD PRESSURE: 107 MMHG

## 2020-03-18 DIAGNOSIS — R00.2 HEART PALPITATIONS: ICD-10-CM

## 2020-03-18 DIAGNOSIS — F43.25 ADJUSTMENT DISORDER WITH MIXED DISTURBANCE OF EMOTIONS AND CONDUCT: ICD-10-CM

## 2020-03-18 DIAGNOSIS — R63.4 WEIGHT LOSS: ICD-10-CM

## 2020-03-18 DIAGNOSIS — R23.1 PALLOR: ICD-10-CM

## 2020-03-18 DIAGNOSIS — R42 ORTHOSTATIC DIZZINESS: ICD-10-CM

## 2020-03-18 DIAGNOSIS — R13.19 ESOPHAGEAL DYSPHAGIA: ICD-10-CM

## 2020-03-18 DIAGNOSIS — G89.29 CHRONIC PAIN OF RIGHT UPPER EXTREMITY: ICD-10-CM

## 2020-03-18 DIAGNOSIS — R55 SYNCOPE, UNSPECIFIED SYNCOPE TYPE: Primary | ICD-10-CM

## 2020-03-18 DIAGNOSIS — M79.601 CHRONIC PAIN OF RIGHT UPPER EXTREMITY: ICD-10-CM

## 2020-03-18 LAB
ALBUMIN SERPL-MCNC: 3.2 G/DL (ref 3.5–5)
ALBUMIN/GLOB SERPL: 1.6 {RATIO} (ref 1.1–2.2)
ALP SERPL-CCNC: 49 U/L (ref 45–117)
ALT SERPL-CCNC: 34 U/L (ref 12–78)
ANION GAP SERPL CALC-SCNC: 3 MMOL/L (ref 5–15)
AST SERPL-CCNC: 22 U/L (ref 15–37)
BILIRUB SERPL-MCNC: <0.1 MG/DL (ref 0.2–1)
BUN SERPL-MCNC: 28 MG/DL (ref 6–20)
BUN/CREAT SERPL: 34 (ref 12–20)
CALCIUM SERPL-MCNC: 8.3 MG/DL (ref 8.5–10.1)
CHLORIDE SERPL-SCNC: 109 MMOL/L (ref 97–108)
CO2 SERPL-SCNC: 28 MMOL/L (ref 21–32)
CREAT SERPL-MCNC: 0.83 MG/DL (ref 0.7–1.3)
ERYTHROCYTE [DISTWIDTH] IN BLOOD BY AUTOMATED COUNT: 12.4 % (ref 11.5–14.5)
GLOBULIN SER CALC-MCNC: 2 G/DL (ref 2–4)
GLUCOSE SERPL-MCNC: 101 MG/DL (ref 65–100)
HCT VFR BLD AUTO: 20.8 % (ref 36.6–50.3)
HGB BLD-MCNC: 6.9 G/DL (ref 12.1–17)
IRON SATN MFR SERPL: 49 % (ref 20–50)
IRON SERPL-MCNC: 110 UG/DL (ref 35–150)
MAGNESIUM SERPL-MCNC: 2.2 MG/DL (ref 1.6–2.4)
MCH RBC QN AUTO: 31.5 PG (ref 26–34)
MCHC RBC AUTO-ENTMCNC: 33.2 G/DL (ref 30–36.5)
MCV RBC AUTO: 95 FL (ref 80–99)
NRBC # BLD: 0 K/UL (ref 0–0.01)
NRBC BLD-RTO: 0 PER 100 WBC
PLATELET # BLD AUTO: 205 K/UL (ref 150–400)
PMV BLD AUTO: 9.8 FL (ref 8.9–12.9)
POTASSIUM SERPL-SCNC: 3.8 MMOL/L (ref 3.5–5.1)
PROT SERPL-MCNC: 5.2 G/DL (ref 6.4–8.2)
RBC # BLD AUTO: 2.19 M/UL (ref 4.1–5.7)
SODIUM SERPL-SCNC: 140 MMOL/L (ref 136–145)
T4 FREE SERPL-MCNC: 0.8 NG/DL (ref 0.8–1.5)
TIBC SERPL-MCNC: 223 UG/DL (ref 250–450)
TSH SERPL DL<=0.05 MIU/L-ACNC: 3.64 UIU/ML (ref 0.36–3.74)
WBC # BLD AUTO: 4.4 K/UL (ref 4.1–11.1)

## 2020-03-18 NOTE — LETTER
NOTIFICATION OF WORK ACCOMMODATION 
 
3/18/2020 3:43 PM 
 
Mr. Georgina Langford 
[id number] [facility] [id number] To Whom It May Concern: 
 
Georgina Langford is currently under the care of [address] [place] [address],[address]. He is unable to drive to work at this time due to medication conditions that being evaluated. He is able to work from home without restriction. Will re-evaluate 4/1/20. If there are questions or concerns please have the patient contact our office.  
 
 
 
Sincerely, 
 
 
Nelly Gu MD

## 2020-03-18 NOTE — PROGRESS NOTES
HISTORY OF PRESENT ILLNESS    Chief Complaint   Patient presents with    Medication Evaluation     Is accompanied by his wife. Patient reports intermittent episodes of syncope and dizziness when he leans over and then lifts back up. He is not a great historian, but believes these dizziness episodes and syncope episodes have occurred over the past 3 to 4 months. Says that sometimes he is walking and he feels lightheaded and and falls over. That said, only says this only happened around 3 or 4 times total.  His wife says that she saw it happen yesterday. She does not think he is telling the whole story. After he leans over, he feels his heart racing upon sitting up. .  Does not think that he has a lot of heart racing when he is just sitting down at rest.  Denies any palpitations at night. Denies any chest pains. He thinks he is drinking enough fluids. Urine is little bit dark. He has attributed these symptoms to taking venlafaxine which he has prescribed by psychiatry for adjustment disorder and depression. Dr. Micha Agosto started him on 75 mg of venlafaxine and then increase to 150 mg in November. Was increased to 300 mg on February 1, 2020 according to pharmacy records. Says that his appetite has decreased as a result of taking this medication. He is also bloated. Feels somewhat fatigued. Wt Readings from Last 3 Encounters:   03/18/20 167 lb 3.2 oz (75.8 kg)   11/18/19 176 lb (79.8 kg)   09/24/19 184 lb (83.5 kg)     Says that he feels that food has a difficulty getting past his mid esophagus. He does not regurgitate but did vomit once a couple of days ago. This is been progressing. He is able to eat but feels very full in doing so. He had a colonoscopy October 24, 2019 with [facility] with a polyp. He has a history of upper endoscopy for gastritis in November 2010.      Review of Systems   All other systems reviewed and are negative, except as noted in HPI    Past Medical and Surgical History has a past medical history of Adjustment disorder with mixed disturbance of emotions and conduct (7/13/2018), BPH with obstruction/lower urinary tract symptoms (08/2017), Chronic low back pain without sciatica, Chronic lumbar pain, GERD (gastroesophageal reflux disease), Neck pain, chronic, and Pure hypercholesterolemia. has a past surgical history that includes hx lumbar laminectomy (3/5/97); hx vasectomy (2002); hx endoscopy (11/8/10); hx colonoscopy (10/8/07); and hx colonoscopy (10/24/2019). reports that he has quit smoking. He has never used smokeless tobacco. He reports current alcohol use. He reports that he does not use drugs. family history is not on file. Physical Exam   Nursing note and vitals reviewed. Blood pressure 107/71, pulse 84, temperature 99 °F (37.2 °C), temperature source Oral, resp. rate 14, height 5' 10\" (1.778 m), weight 167 lb 3.2 oz (75.8 kg), SpO2 92 %. Constitutional: In no distress. Eyes: Conjunctivae are PALE. HEENT:  No LAD or thyromegaly   Cardiovascular: Normal rate. regular rhythm. No murmurs  No edema  Pulmonary/Chest: Effort normal. clear to ausculation blaterally  Musculoskeletal:  no edema. Abd: Soft, nontender, no masses. Neurological: Alert and oriented. Grossly intact cranial nerves and motor function. Skin: No rash noted. Psychiatric: Blunted affect. ASSESSMENT and PLAN  Diagnoses and all orders for this visit:    1. Syncope, unspecified syncope type  2. Orthostatic dizziness  3. Heart palpitations  I am very concerned about his symptoms although it is really unclear exactly how long they have been going on, but at least for the past 2 to 3 months. Orthostatic symptoms sound the most prominent and I suspect he is dehydrated and/or significantly anemic which is contributing to this. Blood pressure is on the low side of normal.  Recommend increased hydration, add more salt to the diet. Check labs below.   Further recommendation based on lab results and whether or not anemia is present. I do not recommend that he drive for now and have given him a release from driving until April 1 while we work this out. He works from home 4 days a week but usually has to go to work for the fifth day. He is able to work in full capacity from home at this point.  -     [facility]; Future  -     CBC W/O DIFF; Future  -     TSH 3RD GENERATION; Future  -     T4, FREE; Future  -     MAGNESIUM; Future  -     REFERRAL TO CARDIOLOGY    4. Pallor  Significant pallor. Suspect significant anemia. May have occurred after his colonoscopy 10/2019 and a polyp clipping? No obvious blood in stool per patient. I would not be surprised if he will required transfusion. This is subacute however. -     IRON PROFILE; Future    5. Esophageal dysphagia  I am concerned about his dysphagia and possible anemia. Will check labs. If he is anemic, will refer to Dr. Yasmany Lees for upper endoscopy. If no anemia present, may consider esophagram first but likely needs endoscopy. I did not ask about recent NSAID use. Pain recently which may be contributing. Alarm symptoms of weight loss. -     XR BA SWALLOW ESOPHOGRAM; Future    6. Weight loss  Complex secondary to concomitant mood disorder and medication side effects potentially, but this could be an alarm symptom which may warrant aggressive evaluation with upper endoscopy and possible abdominal imaging. 7. Adjustment disorder with mixed disturbance of emotions and conduct  Borderline control. Following up with psychiatry Dr. Trinh Kevin. On high-dose venlafaxine which may be helping somewhat. Patient wants to talk to Dr. Trinh Kevin about cutting back on it. 8. Chronic pain of right upper extremity  He has had an extensive work-up recently for right upper extremity discomfort. Saw 3 orthopedists for shoulder, spine, lateral epicondylitis. Also saw Dr. Katarina Deleon for pain.   He has rotator cuff pathology with a labral tear, cervical DDD with normal EMG, medial epicondylitis. Unlear etiology. He is frustrated about this condition. Several injections may have helped somewhat.      lab results and schedule of future lab studies reviewed with patient  reviewed medications and side effects in detail    Return to clinic for further evaluation if new symptoms develop or if current symptoms worsen or fail to resolve. There are no Patient Instructions on file for this visit.

## 2020-03-19 ENCOUNTER — HOSPITAL ENCOUNTER (INPATIENT)
Age: 55
LOS: 1 days | Discharge: HOME | DRG: 378 | End: 2020-03-20
Attending: EMERGENCY MEDICINE | Admitting: INTERNAL MEDICINE
Payer: COMMERCIAL

## 2020-03-19 ENCOUNTER — TELEPHONE (OUTPATIENT)
Dept: INTERNAL MEDICINE CLINIC | Age: 55
End: 2020-03-19

## 2020-03-19 DIAGNOSIS — K92.1 BLACK STOOL: ICD-10-CM

## 2020-03-19 DIAGNOSIS — D64.9 ANEMIA, UNSPECIFIED TYPE: Primary | ICD-10-CM

## 2020-03-19 DIAGNOSIS — R55 RECURRENT SYNCOPE: ICD-10-CM

## 2020-03-19 PROBLEM — K92.2 GI BLEED: Status: ACTIVE | Noted: 2020-03-19

## 2020-03-19 LAB
ALBUMIN SERPL-MCNC: 3.2 G/DL (ref 3.5–5)
ALBUMIN/GLOB SERPL: 1.3 {RATIO} (ref 1.1–2.2)
ALP SERPL-CCNC: 47 U/L (ref 45–117)
ALT SERPL-CCNC: 36 U/L (ref 12–78)
ANION GAP SERPL CALC-SCNC: 3 MMOL/L (ref 5–15)
AST SERPL-CCNC: 22 U/L (ref 15–37)
ATRIAL RATE: 74 BPM
BASOPHILS # BLD: 0 K/UL (ref 0–0.1)
BASOPHILS NFR BLD: 1 % (ref 0–1)
BILIRUB DIRECT SERPL-MCNC: 0.1 MG/DL (ref 0–0.2)
BILIRUB SERPL-MCNC: 0.3 MG/DL (ref 0.2–1)
BUN SERPL-MCNC: 21 MG/DL (ref 6–20)
BUN/CREAT SERPL: 25 (ref 12–20)
CALCIUM SERPL-MCNC: 7.9 MG/DL (ref 8.5–10.1)
CALCULATED P AXIS, ECG09: 37 DEGREES
CALCULATED R AXIS, ECG10: 40 DEGREES
CALCULATED T AXIS, ECG11: 45 DEGREES
CHLORIDE SERPL-SCNC: 109 MMOL/L (ref 97–108)
CO2 SERPL-SCNC: 28 MMOL/L (ref 21–32)
COMMENT, HOLDF: NORMAL
CREAT SERPL-MCNC: 0.83 MG/DL (ref 0.7–1.3)
DIAGNOSIS, 93000: NORMAL
DIFFERENTIAL METHOD BLD: ABNORMAL
EOSINOPHIL # BLD: 0.1 K/UL (ref 0–0.4)
EOSINOPHIL NFR BLD: 2 % (ref 0–7)
ERYTHROCYTE [DISTWIDTH] IN BLOOD BY AUTOMATED COUNT: 12.8 % (ref 11.5–14.5)
GLOBULIN SER CALC-MCNC: 2.5 G/DL (ref 2–4)
GLUCOSE SERPL-MCNC: 96 MG/DL (ref 65–100)
HCT VFR BLD AUTO: 21 % (ref 36.6–50.3)
HEMOCCULT STL QL: NEGATIVE
HGB BLD-MCNC: 7 G/DL (ref 12.1–17)
IMM GRANULOCYTES # BLD AUTO: 0 K/UL (ref 0–0.04)
IMM GRANULOCYTES NFR BLD AUTO: 0 % (ref 0–0.5)
LYMPHOCYTES # BLD: 1.3 K/UL (ref 0.8–3.5)
LYMPHOCYTES NFR BLD: 35 % (ref 12–49)
MCH RBC QN AUTO: 31.8 PG (ref 26–34)
MCHC RBC AUTO-ENTMCNC: 33.3 G/DL (ref 30–36.5)
MCV RBC AUTO: 95.5 FL (ref 80–99)
MONOCYTES # BLD: 0.4 K/UL (ref 0–1)
MONOCYTES NFR BLD: 11 % (ref 5–13)
NEUTS SEG # BLD: 1.8 K/UL (ref 1.8–8)
NEUTS SEG NFR BLD: 51 % (ref 32–75)
NRBC # BLD: 0 K/UL (ref 0–0.01)
NRBC BLD-RTO: 0 PER 100 WBC
P-R INTERVAL, ECG05: 178 MS
PLATELET # BLD AUTO: 216 K/UL (ref 150–400)
PMV BLD AUTO: 9.3 FL (ref 8.9–12.9)
POTASSIUM SERPL-SCNC: 3.8 MMOL/L (ref 3.5–5.1)
PROT SERPL-MCNC: 5.7 G/DL (ref 6.4–8.2)
Q-T INTERVAL, ECG07: 360 MS
QRS DURATION, ECG06: 72 MS
QTC CALCULATION (BEZET), ECG08: 399 MS
RBC # BLD AUTO: 2.2 M/UL (ref 4.1–5.7)
SAMPLES BEING HELD,HOLD: NORMAL
SODIUM SERPL-SCNC: 140 MMOL/L (ref 136–145)
TROPONIN I SERPL-MCNC: <0.05 NG/ML
VENTRICULAR RATE, ECG03: 74 BPM
WBC # BLD AUTO: 3.6 K/UL (ref 4.1–11.1)

## 2020-03-19 PROCEDURE — 80076 HEPATIC FUNCTION PANEL: CPT

## 2020-03-19 PROCEDURE — 36415 COLL VENOUS BLD VENIPUNCTURE: CPT

## 2020-03-19 PROCEDURE — 99284 EMERGENCY DEPT VISIT MOD MDM: CPT

## 2020-03-19 PROCEDURE — 86923 COMPATIBILITY TEST ELECTRIC: CPT

## 2020-03-19 PROCEDURE — 30233N1 TRANSFUSION OF NONAUTOLOGOUS RED BLOOD CELLS INTO PERIPHERAL VEIN, PERCUTANEOUS APPROACH: ICD-10-PCS | Performed by: INTERNAL MEDICINE

## 2020-03-19 PROCEDURE — 82272 OCCULT BLD FECES 1-3 TESTS: CPT

## 2020-03-19 PROCEDURE — 80048 BASIC METABOLIC PNL TOTAL CA: CPT

## 2020-03-19 PROCEDURE — P9016 RBC LEUKOCYTES REDUCED: HCPCS

## 2020-03-19 PROCEDURE — 94762 N-INVAS EAR/PLS OXIMTRY CONT: CPT

## 2020-03-19 PROCEDURE — C9113 INJ PANTOPRAZOLE SODIUM, VIA: HCPCS | Performed by: INTERNAL MEDICINE

## 2020-03-19 PROCEDURE — C9113 INJ PANTOPRAZOLE SODIUM, VIA: HCPCS | Performed by: SPECIALIST

## 2020-03-19 PROCEDURE — 85025 COMPLETE CBC W/AUTO DIFF WBC: CPT

## 2020-03-19 PROCEDURE — 93005 ELECTROCARDIOGRAM TRACING: CPT

## 2020-03-19 PROCEDURE — 86900 BLOOD TYPING SEROLOGIC ABO: CPT

## 2020-03-19 PROCEDURE — 74011250637 HC RX REV CODE- 250/637: Performed by: INTERNAL MEDICINE

## 2020-03-19 PROCEDURE — 74011250636 HC RX REV CODE- 250/636: Performed by: SPECIALIST

## 2020-03-19 PROCEDURE — 74011250636 HC RX REV CODE- 250/636: Performed by: INTERNAL MEDICINE

## 2020-03-19 PROCEDURE — 84484 ASSAY OF TROPONIN QUANT: CPT

## 2020-03-19 PROCEDURE — 36430 TRANSFUSION BLD/BLD COMPNT: CPT

## 2020-03-19 PROCEDURE — 74011000250 HC RX REV CODE- 250: Performed by: SPECIALIST

## 2020-03-19 PROCEDURE — 65270000029 HC RM PRIVATE

## 2020-03-19 RX ORDER — TRAZODONE HYDROCHLORIDE 100 MG/1
100 TABLET ORAL
COMMUNITY
End: 2020-10-26 | Stop reason: ALTCHOICE

## 2020-03-19 RX ORDER — TRAZODONE HYDROCHLORIDE 50 MG/1
100 TABLET ORAL
Status: DISCONTINUED | OUTPATIENT
Start: 2020-03-19 | End: 2020-03-20 | Stop reason: HOSPADM

## 2020-03-19 RX ORDER — ZOLPIDEM TARTRATE 10 MG/1
10 TABLET ORAL
COMMUNITY

## 2020-03-19 RX ORDER — VENLAFAXINE HYDROCHLORIDE 150 MG/1
300 CAPSULE, EXTENDED RELEASE ORAL DAILY
Status: DISCONTINUED | OUTPATIENT
Start: 2020-03-20 | End: 2020-03-20 | Stop reason: HOSPADM

## 2020-03-19 RX ORDER — SODIUM CHLORIDE 0.9 % (FLUSH) 0.9 %
5-40 SYRINGE (ML) INJECTION EVERY 8 HOURS
Status: DISCONTINUED | OUTPATIENT
Start: 2020-03-19 | End: 2020-03-20 | Stop reason: HOSPADM

## 2020-03-19 RX ORDER — ACETAMINOPHEN 325 MG/1
650 TABLET ORAL
Status: DISCONTINUED | OUTPATIENT
Start: 2020-03-19 | End: 2020-03-20 | Stop reason: HOSPADM

## 2020-03-19 RX ORDER — NALOXONE HYDROCHLORIDE 0.4 MG/ML
0.4 INJECTION, SOLUTION INTRAMUSCULAR; INTRAVENOUS; SUBCUTANEOUS AS NEEDED
Status: DISCONTINUED | OUTPATIENT
Start: 2020-03-19 | End: 2020-03-20 | Stop reason: HOSPADM

## 2020-03-19 RX ORDER — ALFUZOSIN HYDROCHLORIDE 10 MG/1
10 TABLET, EXTENDED RELEASE ORAL DAILY
Status: DISCONTINUED | OUTPATIENT
Start: 2020-03-20 | End: 2020-03-20 | Stop reason: HOSPADM

## 2020-03-19 RX ORDER — SODIUM CHLORIDE 9 MG/ML
250 INJECTION, SOLUTION INTRAVENOUS AS NEEDED
Status: DISCONTINUED | OUTPATIENT
Start: 2020-03-19 | End: 2020-03-20 | Stop reason: HOSPADM

## 2020-03-19 RX ORDER — SODIUM CHLORIDE 0.9 % (FLUSH) 0.9 %
5-40 SYRINGE (ML) INJECTION AS NEEDED
Status: DISCONTINUED | OUTPATIENT
Start: 2020-03-19 | End: 2020-03-20 | Stop reason: HOSPADM

## 2020-03-19 RX ORDER — ZOLPIDEM TARTRATE 5 MG/1
5 TABLET ORAL
Status: DISCONTINUED | OUTPATIENT
Start: 2020-03-19 | End: 2020-03-20 | Stop reason: HOSPADM

## 2020-03-19 RX ADMIN — SODIUM CHLORIDE 40 MG: 9 INJECTION INTRAMUSCULAR; INTRAVENOUS; SUBCUTANEOUS at 21:14

## 2020-03-19 RX ADMIN — SODIUM CHLORIDE 40 MG: 9 INJECTION INTRAMUSCULAR; INTRAVENOUS; SUBCUTANEOUS at 13:59

## 2020-03-19 RX ADMIN — Medication 10 ML: at 21:15

## 2020-03-19 RX ADMIN — TRAZODONE HYDROCHLORIDE 100 MG: 50 TABLET ORAL at 21:14

## 2020-03-19 RX ADMIN — Medication 10 ML: at 14:27

## 2020-03-19 NOTE — PROGRESS NOTES
Admission Medication Reconciliation:     Information obtained from:   Spouse via phone call  Pharmacist did not enter the patient's room or interact directly with the patient. RxQuery data available¹:  YES    Comments/Recommendations:   Updated PTA medication list  Verified preferred pharmacy  Reviewed patient's allergies     ¹RxQuery pharmacy benefit data reflects medications filled and processed through the patient's insurance, however   this data does NOT capture whether the medication was picked up or is currently being taken by the patient. Prior to Admission Medications   Prescriptions Last Dose Informant Taking? Venlafaxine 150 mg tr24 3/19/2020 at Unknown time Significant Other Yes   Sig: Take 300 mg by mouth daily. alfuzosin SR (UROXATRAL) 10 mg SR tablet 3/19/2020 at Unknown time Significant Other Yes   Sig: TAKE 1 TABLET BY MOUTH EVERY DAY   simvastatin (ZOCOR) 20 mg tablet 3/18/2020 at Unknown time Significant Other Yes   Sig: TAKE 1 TABLET BY MOUTH ONE TIME EVERY NIGHT   traZODone (DESYREL) 100 mg tablet 3/18/2020 at Unknown time Significant Other Yes   Sig: Take 100 mg by mouth nightly. zolpidem (AMBIEN) 10 mg tablet 3/18/2020 at Unknown time Significant Other Yes   Sig: Take 5 mg by mouth nightly. Facility-Administered Medications: None         Please contact the main inpatient pharmacy with any questions or concerns at [phone] and we will direct you to the clinical pharmacist covering this patient's care while in-house.    Anusha Montmeayor, PastorD, BCPS

## 2020-03-19 NOTE — TELEPHONE ENCOUNTER
I spoke to patient. Labs show hemoglobin of 6.9. Patient is symptomatic with orthostatic dizziness, syncopal episodes, severe fatigue. This is still likely subacute, but significantly symptomatic. Interestingly iron levels are normal.    He does have dysphagia, progressive GERD and anorexia symptoms, weight loss. Depression contributing, but these are alarm s/s . Hx colonoscopy with clipped polyp in October 2019 (chronic bleed from this?)  Due to significant symptomatic anemia, I have recommended that he report to the emergency room at [address] [place] [address] now for evaluation and transfusion. I think admission would be preferred to have this evaluated and possibly have an inpatient upper endoscopy to evaluate for alarm symptoms of weight loss and symptomatic anemia. Consider repeat colonoscopy as well. Consider additional causes of anemia. Consider CT of the abdomen and/or chest to evaluate for mass-effect that could be causing his dysphagia symptoms. He voices understanding.      I have called the emergency room, but no answer

## 2020-03-19 NOTE — PROGRESS NOTES
Patient became very upset soon after I walk in the door and began my evaluation, noting that he had told the same story many times, cursing and very abrasive before I could hardly get a word out. He is upset at [facility] and had the expectation that he was going to receive the transfusion right away and EGD, because his PCP had \"arranged it before he showed up to the ER\". As I was trying to explain that was going to be the plan, he began to use foul language, asking to be discharged. I tried to advise that was not a good idea as he is less likely to get an EGD as soon as tomorrow and we already had a plan in place. I discussed with Dr. Akua Martinez who states he will try to convince the patient to stay, as he knows him personally. ADDENDUM:  Patient now agreeable to staying after he talked with Dr. Nickolas Pennington, appreciate Dr. Ruthanne Hodgkin assistance in obtaining transfusion consent as well.

## 2020-03-19 NOTE — CONSULTS
Siddhartha Galo. Karen Faith MD  [phone] office  [phone] voicemail   Gastroenterology Consultation Note      Admit Date: 3/19/2020  Consult Date: 3/19/2020   I greatly appreciate your asking me to see Allie Merrill., thank you very much for the opportunity to participate in his care. Narrative Assessment and Plan   · Melena  · Symptomatic anemia  · NSAID use for shoulder pain  · History of colon adenoma, small, with colonoscopy performed 12/2019 at my hand    PPI, clears, transfusion, NPO after midnight for EGD, avoid NSAIDs, antiplatelets, and anticoagulants. Subjective:     Chief Complaint: Anemia, syncope. History of Present Illness: For the last 3-4 weeks has had some shoulder pain requiring use of ibuprofen and aleve. Developed dark/black stool several days ago and on Tuesday he had a syncopal event. Describes pain right shoulder. No hematemesis. Vitals stable without tachycardia or hypotension. PCP:  Jeronimo Cruz MD    Past Medical History:   Diagnosis Date    Adjustment disorder with mixed disturbance of emotions and conduct 7/13/2018    BPH with obstruction/lower urinary tract symptoms 08/2017    saw urology 8/2017    Chronic low back pain without sciatica     Chronic lumbar pain     saw Dr. Stef Herbert; MRI 6/2016, 2012, s/p lumbar laminectomy 2007    GERD (gastroesophageal reflux disease)     Neck pain, chronic     seeing chiro Nicole Sophie. Mild canal stenosis at C5-C6 with moderate left and mild right foraminal      Pure hypercholesterolemia         Past Surgical History:   Procedure Laterality Date    HX COLONOSCOPY  10/8/07    normal, hemorrohoids    HX COLONOSCOPY  10/24/2019    4 mm polyp. Dr. Karen Faith.     HX ENDOSCOPY  11/8/10    mild gastritis    HX LUMBAR LAMINECTOMY  3/5/97    L5- S1 w L5 L disc extrusion  in [place]    HX VASECTOMY  2002       Social History     Tobacco Use    Smoking status: Former Smoker    Smokeless tobacco: Never Used   Substance Use Topics    Alcohol use: Yes     Comment: 1-4 per month        Family History   Problem Relation Age of Onset    Heart Disease Neg Hx     Heart Attack Neg Hx     Cancer Neg Hx     Asthma Neg Hx         Allergies   Allergen Reactions    Betadine [Povidone-Iodine] Rash    Lipitor [Atorvastatin] Other (comments)    Nitroglycerin Other (comments)     \"made his heart stop\"            Home Medications:  Prior to Admission Medications   Prescriptions Last Dose Informant Patient Reported? Taking? Venlafaxine 150 mg tr24   Yes No   Sig: Take  by mouth. Takes twice daily   alfuzosin SR (UROXATRAL) 10 mg SR tablet   No No   Sig: TAKE 1 TABLET BY MOUTH EVERY DAY   simvastatin (ZOCOR) 20 mg tablet   No No   Sig: TAKE 1 TABLET BY MOUTH ONE TIME EVERY NIGHT      Facility-Administered Medications: None       Hospital Medications:  Current Facility-Administered Medications   Medication Dose Route Frequency    pantoprazole (PROTONIX) 40 mg in 0.9% sodium chloride 10 mL injection  40 mg IntraVENous Q12H    0.9% sodium chloride infusion 250 mL  250 mL IntraVENous PRN     Current Outpatient Medications   Medication Sig    alfuzosin SR (UROXATRAL) 10 mg SR tablet TAKE 1 TABLET BY MOUTH EVERY DAY    simvastatin (ZOCOR) 20 mg tablet TAKE 1 TABLET BY MOUTH ONE TIME EVERY NIGHT    Venlafaxine 150 mg tr24 Take  by mouth. Takes twice daily       Review of Systems: Admission ROS by No admitting provider for patient encounter.  from 3/19/2020 were reviewed with the patient and changes (other than per HPI) include: none      Objective:     Physical Exam:  Visit Vitals  /66   Pulse 75   Temp 98.4 °F (36.9 °C)   Resp 10   Ht 5' 10\" (1.778 m)   Wt 76.2 kg (168 lb)   SpO2 100%   BMI 24.11 kg/m²     SpO2 Readings from Last 6 Encounters:   03/19/20 100%   03/18/20 92%   11/18/19 96%   09/24/19 95%   03/26/19 99%   02/04/19 95%        No intake or output data in the 24 hours ending 03/19/20 1247   General: no distress, comfortable  Skin:  No rash or palpable dermatologic mass lesions  HEENT: Pupils equal, sclera anicteric, oropharynx with no gross lesions  Cardiovascular: No abnormal audible heart sounds, well perfused, no edema  Respiratory:  No abnormal audible breath sounds, normal respiratory effort, no throacic deformity  GI:  Abdomen nondistended, nontender, no mass, no free fluid, no rebound or guarding. Musculoskeletal:  No skeletal deformity nor acute arthritis noted. Neurological:  Motor and sensory function intact in upper extremeties  Psychiatric:  Normal affect, memory intact, appears to have insight into current illness  Lymphatic:  No cervical, supraclavicular, or periumbilic lymphadenopathy    Laboratory:    Recent Results (from the past 24 hour(s))   METABOLIC PANEL, COMPREHENSIVE    Collection Time: 03/18/20  3:58 PM   Result Value Ref Range    Sodium 140 136 - 145 mmol/L    Potassium 3.8 3.5 - 5.1 mmol/L    Chloride 109 (H) 97 - 108 mmol/L    CO2 28 21 - 32 mmol/L    Anion gap 3 (L) 5 - 15 mmol/L    Glucose 101 (H) 65 - 100 mg/dL    BUN 28 (H) 6 - 20 MG/DL    Creatinine 0.83 0.70 - 1.30 MG/DL    BUN/Creatinine ratio 34 (H) 12 - 20      GFR est AA >60 >60 ml/min/1.73m2    GFR est non-AA >60 >60 ml/min/1.73m2    Calcium 8.3 (L) 8.5 - 10.1 MG/DL    Bilirubin, total <0.1 (L) 0.2 - 1.0 MG/DL    ALT (SGPT) 34 12 - 78 U/L    AST (SGOT) 22 15 - 37 U/L    Alk.  phosphatase 49 45 - 117 U/L    Protein, total 5.2 (L) 6.4 - 8.2 g/dL    Albumin 3.2 (L) 3.5 - 5.0 g/dL    Globulin 2.0 2.0 - 4.0 g/dL    A-G Ratio 1.6 1.1 - 2.2     CBC W/O DIFF    Collection Time: 03/18/20  3:58 PM   Result Value Ref Range    WBC 4.4 4.1 - 11.1 K/uL    RBC 2.19 (L) 4.10 - 5.70 M/uL    HGB 6.9 (L) 12.1 - 17.0 g/dL    HCT 20.8 (L) 36.6 - 50.3 %    MCV 95.0 80.0 - 99.0 FL    MCH 31.5 26.0 - 34.0 PG    MCHC 33.2 30.0 - 36.5 g/dL    RDW 12.4 11.5 - 14.5 %    PLATELET 867 094 - 495 K/uL    MPV 9.8 8.9 - 12.9 FL    NRBC 0.0 0  WBC ABSOLUTE NRBC 0.00 0.00 - 0.01 K/uL   TSH 3RD GENERATION    Collection Time: 03/18/20  3:58 PM   Result Value Ref Range    TSH 3.64 0.36 - 3.74 uIU/mL   T4, FREE    Collection Time: 03/18/20  3:58 PM   Result Value Ref Range    T4, Free 0.8 0.8 - 1.5 NG/DL   MAGNESIUM    Collection Time: 03/18/20  3:58 PM   Result Value Ref Range    Magnesium 2.2 1.6 - 2.4 mg/dL   IRON PROFILE    Collection Time: 03/18/20  3:58 PM   Result Value Ref Range    Iron 110 35 - 150 ug/dL    TIBC 223 (L) 250 - 450 ug/dL    Iron % saturation 49 20 - 50 %   SAMPLES BEING HELD    Collection Time: 03/19/20 11:04 AM   Result Value Ref Range    SAMPLES BEING HELD 1LAV,1RD,1BL,1PST     COMMENT        Add-on orders for these samples will be processed based on acceptable specimen integrity and analyte stability, which may vary by analyte. METABOLIC PANEL, BASIC    Collection Time: 03/19/20 11:04 AM   Result Value Ref Range    Sodium 140 136 - 145 mmol/L    Potassium 3.8 3.5 - 5.1 mmol/L    Chloride 109 (H) 97 - 108 mmol/L    CO2 28 21 - 32 mmol/L    Anion gap 3 (L) 5 - 15 mmol/L    Glucose 96 65 - 100 mg/dL    BUN 21 (H) 6 - 20 MG/DL    Creatinine 0.83 0.70 - 1.30 MG/DL    BUN/Creatinine ratio 25 (H) 12 - 20      GFR est AA >60 >60 ml/min/1.73m2    GFR est non-AA >60 >60 ml/min/1.73m2    Calcium 7.9 (L) 8.5 - 10.1 MG/DL   CBC WITH AUTOMATED DIFF    Collection Time: 03/19/20 11:04 AM   Result Value Ref Range    WBC 3.6 (L) 4.1 - 11.1 K/uL    RBC 2.20 (L) 4.10 - 5.70 M/uL    HGB 7.0 (L) 12.1 - 17.0 g/dL    HCT 21.0 (L) 36.6 - 50.3 %    MCV 95.5 80.0 - 99.0 FL    MCH 31.8 26.0 - 34.0 PG    MCHC 33.3 30.0 - 36.5 g/dL    RDW 12.8 11.5 - 14.5 %    PLATELET 893 568 - 910 K/uL    MPV 9.3 8.9 - 12.9 FL    NRBC 0.0 0  WBC    ABSOLUTE NRBC 0.00 0.00 - 0.01 K/uL    NEUTROPHILS 51 32 - 75 %    LYMPHOCYTES 35 12 - 49 %    MONOCYTES 11 5 - 13 %    EOSINOPHILS 2 0 - 7 %    BASOPHILS 1 0 - 1 %    IMMATURE GRANULOCYTES 0 0.0 - 0.5 %    ABS.  NEUTROPHILS 1.8 1.8 - 8.0 K/UL    ABS. LYMPHOCYTES 1.3 0.8 - 3.5 K/UL    ABS. MONOCYTES 0.4 0.0 - 1.0 K/UL    ABS. EOSINOPHILS 0.1 0.0 - 0.4 K/UL    ABS. BASOPHILS 0.0 0.0 - 0.1 K/UL    ABS. IMM. GRANS. 0.0 0.00 - 0.04 K/UL    DF AUTOMATED     HEPATIC FUNCTION PANEL    Collection Time: 03/19/20 11:04 AM   Result Value Ref Range    Protein, total 5.7 (L) 6.4 - 8.2 g/dL    Albumin 3.2 (L) 3.5 - 5.0 g/dL    Globulin 2.5 2.0 - 4.0 g/dL    A-G Ratio 1.3 1.1 - 2.2      Bilirubin, total 0.3 0.2 - 1.0 MG/DL    Bilirubin, direct 0.1 0.0 - 0.2 MG/DL    Alk. phosphatase 47 45 - 117 U/L    AST (SGOT) 22 15 - 37 U/L    ALT (SGPT) 36 12 - 78 U/L   TYPE & SCREEN    Collection Time: 03/19/20 11:04 AM   Result Value Ref Range    Crossmatch Expiration 03/22/2020     ABO/Rh(D) Darrol Curtis POSITIVE     Antibody screen NEG    TROPONIN I    Collection Time: 03/19/20 11:04 AM   Result Value Ref Range    Troponin-I, Qt. <0.05 <0.05 ng/mL   OCCULT BLOOD, STOOL    Collection Time: 03/19/20 11:23 AM   Result Value Ref Range    Occult blood, stool NEGATIVE  NEG     EKG, 12 LEAD, INITIAL    Collection Time: 03/19/20 11:41 AM   Result Value Ref Range    Ventricular Rate 74 BPM    Atrial Rate 74 BPM    P-R Interval 178 ms    QRS Duration 72 ms    Q-T Interval 360 ms    QTC Calculation (Bezet) 399 ms    Calculated P Axis 37 degrees    Calculated R Axis 40 degrees    Calculated T Axis 45 degrees    Diagnosis       Normal sinus rhythm  Increased R/S ratio in V1, consider early transition or posterior infarct  Abnormal ECG  When compared with ECG of 25-MAR-2019 23:30,  No significant change was found           Assessment/Plan:     Active Problems:    * No active hospital problems. *       See above narrative for full detail.

## 2020-03-19 NOTE — PROGRESS NOTES
Bedside shift change report given to Patti Cowan RN (oncoming nurse) by Milly Fowler RN (offgoing nurse). Report included the following information SBAR, Kardex, Procedure Summary, Intake/Output, MAR and Recent Results.

## 2020-03-19 NOTE — PROGRESS NOTES
Bedside shift change report given to Joseph Beckham RN (oncoming nurse) by Griselda Phillips, RN (offgoing nurse). Report included the following information SBAR, Kardex, MAR, Accordion and Recent Results.

## 2020-03-19 NOTE — PROGRESS NOTES
GI note    Known to me from colonoscopy a few months ago which revealed only a small adenoma. Now with melena, symptomatic anemia, and need for hospitalization and blood transfusion. He would benefit from upper GI endoscopy but at this juncture I think the best balance of risk/benefit would be that he receive transfusion before anesthesia. As a result, he is scheduled for EGD tomorrow 3/20 0800. PPI, clears, NPO after midnight.       Anabelle Manjarrez MD

## 2020-03-19 NOTE — ED TRIAGE NOTES
Pt reports fatigue and weakness \"for a while. \" Went to his PCP and had bloodwork done, was told to come here for anemia.

## 2020-03-19 NOTE — ED NOTES
TRANSFER - OUT REPORT:    Verbal report given to Inna Brown RN (name) on Nita Morgan.  being transferred to [phone] (unit) for change in patient condition(None progression of care)       Report consisted of patients Situation, Background, Assessment and   Recommendations(SBAR). Information from the following report(s) SBAR was reviewed with the receiving nurse. Lines:   Peripheral IV 03/19/20 Left;Upper Arm (Active)   Site Assessment Clean, dry, & intact 3/19/2020 11:08 AM   Phlebitis Assessment 0 3/19/2020 11:08 AM   Infiltration Assessment 0 3/19/2020 11:08 AM   Dressing Status Clean, dry, & intact 3/19/2020 11:08 AM   Dressing Type Tape;Transparent 3/19/2020 11:08 AM   Hub Color/Line Status Pink;Patent; Flushed 3/19/2020 11:08 AM   Action Taken Blood drawn 3/19/2020 11:08 AM        Opportunity for questions and clarification was provided.       Patient transported with:   [employer]

## 2020-03-19 NOTE — ED PROVIDER NOTES
78-year-old male with a history of depression, chronic right arm pain presents with syncopal episodes since the prior summer. He states that he has passed out several times. He had his blood work measured recently and he is very anemic. He endorses black stool for the past couple weeks. He has had lightheadedness anytime he does anything. He has been taking NSAIDs for chronic pain in his arm. He has been taking 800 mg of ibuprofen as well as Aleve twice a day. He gets very short of breath and his heart races with any exertion. He denies any abdominal pain. On review of his PCP visit recently he did endorse some dysphasia and food getting stuck in his esophagus. He is not currently having any pain. He denies any fever, chills, nausea, vomiting. He does not drink much alcohol. He has had a significant weight loss recently. He has been attributing all of these symptoms to his depression and medications that he has been taking for that. Syncope    Associated symptoms include light-headedness. Pertinent negatives include no chest pain, no fever, no abdominal pain, no headaches and no back pain. His past medical history is significant for syncope. Abnormal Lab Results   Pertinent negatives include no chest pain, no abdominal pain, no headaches and no shortness of breath. Past Medical History:   Diagnosis Date    Adjustment disorder with mixed disturbance of emotions and conduct 7/13/2018    BPH with obstruction/lower urinary tract symptoms 08/2017    saw urology 8/2017    Chronic low back pain without sciatica     Chronic lumbar pain     saw Dr. Maritza Robertson; MRI 6/2016, 2012, s/p lumbar laminectomy 2007    GERD (gastroesophageal reflux disease)     Neck pain, chronic     seeing chiro Alan Goldmann.  Mild canal stenosis at C5-C6 with moderate left and mild right foraminal      Pure hypercholesterolemia        Past Surgical History:   Procedure Laterality Date    HX COLONOSCOPY  10/8/07 normal, hemorrohoids    HX COLONOSCOPY  10/24/2019    4 mm polyp. Dr. Karen Faith.  HX ENDOSCOPY  11/8/10    mild gastritis    HX LUMBAR LAMINECTOMY  3/5/97    L5- S1 w L5 L disc extrusion  in [address] [place] HX VASECTOMY  2002         Family History:   Problem Relation Age of Onset    Heart Disease Neg Hx     Heart Attack Neg Hx     Cancer Neg Hx     Asthma Neg Hx        Social History     Socioeconomic History    Marital status:      Spouse name: ChristianaCare    Number of children: 1    Years of education: Not on file    Highest education level: Not on file   Occupational History    Occupation: [employer]   Social Needs    Financial resource strain: Not on file    Food insecurity     Worry: Not on file     Inability: Not on file   [employer] needs     Medical: Not on file     Non-medical: Not on file   Tobacco Use    Smoking status: Former Smoker    Smokeless tobacco: Never Used   Substance and Sexual Activity    Alcohol use: Yes     Comment: 1-4 per month    Drug use: No    Sexual activity: Yes   Lifestyle    Physical activity     Days per week: Not on file     Minutes per session: Not on file    Stress: Not on file   Relationships    Social connections     Talks on phone: Not on file     Gets together: Not on file     Attends Evangelical service: Not on file     Active member of club or organization: Not on file     Attends meetings of clubs or organizations: Not on file     Relationship status: Not on file    Intimate partner violence     Fear of current or ex partner: Not on file     Emotionally abused: Not on file     Physically abused: Not on file     Forced sexual activity: Not on file   Other Topics Concern    Not on file   Social History Narrative    1 son, age 12 (10/2018)         ALLERGIES: Betadine [povidone-iodine]; Lipitor [atorvastatin]; and Nitroglycerin    Review of Systems   Constitutional: Positive for appetite change. Negative for chills and fever. HENT: Negative for ear pain and sore throat. Eyes: Negative for pain. Respiratory: Negative for chest tightness and shortness of breath. Cardiovascular: Positive for syncope. Negative for chest pain. Gastrointestinal: Negative for abdominal pain. Genitourinary: Negative for flank pain. Musculoskeletal: Negative for back pain. Skin: Negative for rash. Neurological: Positive for syncope and light-headedness. Negative for headaches. All other systems reviewed and are negative. Vitals:    03/19/20 1044 03/19/20 1100   BP: 104/59 110/66   Pulse: 78 75   Resp: 16 10   Temp: 98.4 °F (36.9 °C)    SpO2: 100% 100%   Weight: 76.2 kg (168 lb)    Height: 5' 10\" (1.778 m)             Physical Exam  Vitals signs and nursing note reviewed. Constitutional:       General: He is not in acute distress. HENT:      Head: Normocephalic and atraumatic. Eyes:      General: No scleral icterus. Pupils: Pupils are equal, round, and reactive to light. Comments: Pale conjunctiva   Neck:      Musculoskeletal: No neck rigidity. Trachea: No tracheal deviation. Cardiovascular:      Rate and Rhythm: Normal rate and regular rhythm. Pulmonary:      Effort: Pulmonary effort is normal. No respiratory distress. Breath sounds: Normal breath sounds. No stridor. Abdominal:      General: There is no distension. Palpations: Abdomen is soft. Tenderness: There is no abdominal tenderness. Genitourinary:     Comments: deferred  Musculoskeletal:         General: No deformity. Skin:     General: Skin is warm and dry. Coloration: Skin is pale. Neurological:      General: No focal deficit present. Mental Status: He is alert. Psychiatric:         Mood and Affect: Mood normal.         Behavior: Behavior normal.          MDM   57-year-old male sent from his primary care doctor's office with anemia endorses black stool and chronic excessive NSAID use likely representing upper GI bleed. Will obtain labs, EKG, type and screen, GI consult. Will likely require admission. ED Course as of Mar 19 1245   Thu Mar 19, 2020   1133 Spoke to Dr. Wilman Borden with GI. Will see patient in hospital.    [TT]   1145 EKG shows normal sinus rhythm at rate of 74, normal intervals, normal axis, normal ST segments and T waves, increased R wave in V1 possibly due to lead placement    [TT]      ED Course User Index  [TT] Farshad Garcia MD       Procedures        Hospitalist Kirsty Serve for Admission  12:45 PM    ED Room Number: ER10/10  Patient Name and age:  Malvin Meza. 62 y.o.  male  Working Diagnosis:   1. Anemia, unspecified type    2. Black stool    3. Recurrent syncope      Readmission: no  Isolation Requirements:  no  Recommended Level of Care:  telemetry  Code Status:  Full Code  Department:[facility] ED - [phone]  Other:  Spoke to Dr. Wilman Borden with GI         LABORATORY TESTS:  Labs Reviewed   METABOLIC PANEL, BASIC - Abnormal; Notable for the following components:       Result Value    Chloride 109 (*)     Anion gap 3 (*)     BUN 21 (*)     BUN/Creatinine ratio 25 (*)     Calcium 7.9 (*)     All other components within normal limits   CBC WITH AUTOMATED DIFF - Abnormal; Notable for the following components:    WBC 3.6 (*)     RBC 2.20 (*)     HGB 7.0 (*)     HCT 21.0 (*)     All other components within normal limits   HEPATIC FUNCTION PANEL - Abnormal; Notable for the following components:    Protein, total 5.7 (*)     Albumin 3.2 (*)     All other components within normal limits   SAMPLES BEING HELD   OCCULT BLOOD, STOOL   TROPONIN I   TYPE & SCREEN   TYPE + CROSSMATCH       IMAGING RESULTS:  No orders to display       MEDICATIONS GIVEN:  Medications   pantoprazole (PROTONIX) 40 mg in 0.9% sodium chloride 10 mL injection (has no administration in time range)   0.9% sodium chloride infusion 250 mL (has no administration in time range)         IMPRESSION/ PLAN:  1.  Anemia, unspecified type 2. Black stool    3. Recurrent syncope      - transfuse, admit, GI consult    Total critical care time spent exclusive of procedures:  0 minutes      Dandre Sunshine MD

## 2020-03-19 NOTE — H&P
[address]  [address] [place] [address] 19  [phone]    Hospitalist Admission Note      NAME:  Georgina Langford :   1965   MRN:  [id number]     PCP:  Stephanie Bray MD     Date of Service/Time:  3/19/2020 1:35 PM         Assessment / Plan:            GI bleed / melena: likely subacute bleeding. Likely from NSAID overuse, possible gastric ulcer. Likely the cause of his syncope/pre-syncope. Monitor closely. IV PPI BID. Agree with pRBC transfusion and EGD. Advise against NSAID. Follow CBC. Chronic lumbar pain (): Tylenol PRN      GERD (gastroesophageal reflux disease) (): IV PPI for now      BPH with obstruction/lower urinary tract symptoms: continue alfuzosin      Depression with anxiety / Adjustment disorder with mixed disturbance of emotions and conduct: poorly controlled evident with his outburst in the ED. Advise psych follow up/ counseling. Cont Venlafaxine, trazodone      Code Status: FULL     Surrogate decision maker: spouse      Previous notes and lab results reviewed, including Dr. Toshia Deleon note      Total time spent with patient: 79 Minutes   Time spent in the care of this patient included reviewing records, discussing with nursing, obtaining history and examining the patient, and discussing treatment plans, with >50% time spent counseling/coordinating care    Risk of deterioration: High                 Care Plan discussed with: ED provider, Patient, Nursing Staff and >50% of time spent in counseling and coordination of care    Discussed:  Care Plan and D/C Planning    Prophylaxis:  SCD's and H2B/PPI    Disposition:  Home w/Family                 Subjective:     CHIEF COMPLAINT: weak    HISTORY OF PRESENT ILLNESS:     Mr. Kayla Arita is a 62 y.o. male w/ hx of depression/anxiety, BPH who presents with severe anemia. Evaluated by PCP due to recent weakness, lethargy, and multiple episodes of syncope/near-syncope.  PCP checked Hg, found to be 6.9. He was referred to the ED for transfusion and EGD, and pt was under the impression that everything is already prearranged for him as he arrived. He became very upset at me for even attempting to elicit a history, using foul language becoming very abrasive, demanding to be discharged. Dr. Gm Sunshine was able to convince him to stay after some lengthy discussion. ED workup showed Hg 7.0. He did endorse frequent use of NSAIDs. Mr. Dontrell Mackay is admitted for further evaluation and management. Past Medical History:   Diagnosis Date    Adjustment disorder with mixed disturbance of emotions and conduct 7/13/2018    BPH with obstruction/lower urinary tract symptoms 08/2017    saw urology 8/2017    Chronic low back pain without sciatica     Chronic lumbar pain     saw Dr. Majel Halsted; MRI 6/2016, 2012, s/p lumbar laminectomy 2007    GERD (gastroesophageal reflux disease)     Neck pain, chronic     seeing jillian Jin. Mild canal stenosis at C5-C6 with moderate left and mild right foraminal      Pure hypercholesterolemia         Past Surgical History:   Procedure Laterality Date    HX COLONOSCOPY  10/8/07    normal, hemorrohoids    HX COLONOSCOPY  10/24/2019    4 mm polyp. Dr. Funez Situ.  HX ENDOSCOPY  11/8/10    mild gastritis    HX LUMBAR LAMINECTOMY  3/5/97    L5- S1 w L5 L disc extrusion  in [place]    HX VASECTOMY  2002       Social History     Tobacco Use    Smoking status: Former Smoker    Smokeless tobacco: Never Used   Substance Use Topics    Alcohol use: Yes     Comment: 1-4 per month        Family History   Problem Relation Age of Onset    Heart Disease Neg Hx     Heart Attack Neg Hx     Cancer Neg Hx     Asthma Neg Hx         Allergies   Allergen Reactions    Betadine [Povidone-Iodine] Rash    Lipitor [Atorvastatin] Other (comments)    Nitroglycerin Other (comments)     \"made his heart stop\"        Prior to Admission medications    Medication Sig Start Date End Date Taking? Authorizing Provider   zolpidem (AMBIEN) 10 mg tablet Take 5 mg by mouth nightly. Yes Provider, Historical   traZODone (DESYREL) 100 mg tablet Take 100 mg by mouth nightly. Yes Provider, Historical   alfuzosin SR (UROXATRAL) 10 mg SR tablet TAKE 1 TABLET BY MOUTH EVERY DAY 2/24/20  Yes Derek Jack MD   simvastatin (ZOCOR) 20 mg tablet TAKE 1 TABLET BY MOUTH ONE TIME EVERY NIGHT 11/13/19  Yes Derek Jack MD   Venlafaxine 150 mg tr24 Take 300 mg by mouth daily. Yes Provider, Historical       Review of Systems:  (bold if positive, if negative)    Gen:  fatigueEyes:  ENT:  CVS:  syncopePulm:  GI:  melenaGU:  MS:  Skin:  Psych:  Endo:  Hem:  Renal:  Neuro:            Objective:      VITALS:    Vital signs reviewed; most recent are:    Visit Vitals  /66   Pulse 75   Temp 98.4 °F (36.9 °C)   Resp 10   Ht 5' 10\" (1.778 m)   Wt 76.2 kg (168 lb)   SpO2 100%   BMI 24.11 kg/m²     SpO2 Readings from Last 6 Encounters:   03/19/20 100%   03/18/20 92%   11/18/19 96%   09/24/19 95%   03/26/19 99%   02/04/19 95%        No intake or output data in the 24 hours ending 03/19/20 1335         Exam:     Physical Exam:    Gen:  Well-developed, well-nourished, in no acute distress  HEENT:  No scleral icterus, hearing intact to voice, moist mucous membranes  Neck:  Supple, without masses. Resp:  No accessory muscle use. CTAB without wheezing, rales, rhonchi  Card: RRR. Normal S1 and S2 without murmurs, rubs, or gallops. No peripheral lower extremity edema. No JVD. Peripheral pulses in tact. Abd:  Normoactive bowel sounds. Soft, non-tender, non-distended. No rebound, no guarding. Musc:  No cyanosis or clubbing  Skin:  No rashes or ulcers; turgor intact. Neuro:  Cranial nerves are grossly intact, no focal motor weakness, follows commands appropriately  Psych:  Poor insight, abrasive. Alert, oriented x 3.  Answers questions appropriately       Labs:    Recent Labs     03/19/20  1104   WBC 3.6*   HGB 7.0*   HCT 21.0*      Recent Labs     03/19/20  1104 03/18/20  1558    140   K 3.8 3.8   * 109*   CO2 28 28   GLU 96 101*   BUN 21* 28*   CREA 0.83 0.83   CA 7.9* 8.3*   MG  --  2.2   ALB 3.2* 3.2*   SGOT 22 22   ALT 36 34     No components found for: GLPOC  No results for input(s): PH, PCO2, PO2, HCO3, FIO2 in the last 72 hours. No results for input(s): INR, INREXT in the last 72 hours. No results found for: SDES  Lab Results   Component Value Date/Time    Culture result:  04/18/2017 09:55 AM     NO ROUTINE ENTERIC PATHOGENS ISOLATED INCLUDING SALMONELLA, SHIGELLA, YERSINIA, VIBRIO OR SHIGA TOXIN PRODUCING E. COLI     All other current labs reviewed in the computer. Imaging/Studies:    Imaging personally reviewed.     EKG: NSR  EKG personally reviewed    ___________________________________________________    Attending Physician: Anibal Contreras MD

## 2020-03-20 ENCOUNTER — ANESTHESIA (OUTPATIENT)
Dept: ENDOSCOPY | Age: 55
DRG: 378 | End: 2020-03-20
Payer: COMMERCIAL

## 2020-03-20 ENCOUNTER — ANESTHESIA EVENT (OUTPATIENT)
Dept: ENDOSCOPY | Age: 55
DRG: 378 | End: 2020-03-20
Payer: COMMERCIAL

## 2020-03-20 VITALS
DIASTOLIC BLOOD PRESSURE: 58 MMHG | BODY MASS INDEX: 24.05 KG/M2 | HEART RATE: 79 BPM | WEIGHT: 168 LBS | RESPIRATION RATE: 14 BRPM | OXYGEN SATURATION: 100 % | SYSTOLIC BLOOD PRESSURE: 98 MMHG | TEMPERATURE: 97.9 F | HEIGHT: 70 IN

## 2020-03-20 PROBLEM — F41.8 DEPRESSION WITH ANXIETY: Chronic | Status: ACTIVE | Noted: 2018-07-13

## 2020-03-20 PROBLEM — N40.1 BPH WITH OBSTRUCTION/LOWER URINARY TRACT SYMPTOMS: Chronic | Status: ACTIVE | Noted: 2018-02-23

## 2020-03-20 PROBLEM — N13.8 BPH WITH OBSTRUCTION/LOWER URINARY TRACT SYMPTOMS: Chronic | Status: ACTIVE | Noted: 2018-02-23

## 2020-03-20 LAB
ABO + RH BLD: NORMAL
ALBUMIN SERPL-MCNC: 2.9 G/DL (ref 3.5–5)
ALBUMIN/GLOB SERPL: 1.5 {RATIO} (ref 1.1–2.2)
ALP SERPL-CCNC: 38 U/L (ref 45–117)
ALT SERPL-CCNC: 28 U/L (ref 12–78)
ANION GAP SERPL CALC-SCNC: 3 MMOL/L (ref 5–15)
AST SERPL-CCNC: 17 U/L (ref 15–37)
BASOPHILS # BLD: 0 K/UL (ref 0–0.1)
BASOPHILS NFR BLD: 1 % (ref 0–1)
BILIRUB SERPL-MCNC: 0.3 MG/DL (ref 0.2–1)
BLD PROD TYP BPU: NORMAL
BLOOD GROUP ANTIBODIES SERPL: NORMAL
BPU ID: NORMAL
BUN SERPL-MCNC: 15 MG/DL (ref 6–20)
BUN/CREAT SERPL: 17 (ref 12–20)
CALCIUM SERPL-MCNC: 7.8 MG/DL (ref 8.5–10.1)
CHLORIDE SERPL-SCNC: 110 MMOL/L (ref 97–108)
CO2 SERPL-SCNC: 29 MMOL/L (ref 21–32)
CREAT SERPL-MCNC: 0.88 MG/DL (ref 0.7–1.3)
CROSSMATCH RESULT,%XM: NORMAL
DIFFERENTIAL METHOD BLD: ABNORMAL
EOSINOPHIL # BLD: 0.1 K/UL (ref 0–0.4)
EOSINOPHIL NFR BLD: 2 % (ref 0–7)
ERYTHROCYTE [DISTWIDTH] IN BLOOD BY AUTOMATED COUNT: 13.1 % (ref 11.5–14.5)
GLOBULIN SER CALC-MCNC: 1.9 G/DL (ref 2–4)
GLUCOSE SERPL-MCNC: 91 MG/DL (ref 65–100)
HCT VFR BLD AUTO: 21.1 % (ref 36.6–50.3)
HGB BLD-MCNC: 7 G/DL (ref 12.1–17)
IMM GRANULOCYTES # BLD AUTO: 0 K/UL (ref 0–0.04)
IMM GRANULOCYTES NFR BLD AUTO: 0 % (ref 0–0.5)
LYMPHOCYTES # BLD: 1.7 K/UL (ref 0.8–3.5)
LYMPHOCYTES NFR BLD: 45 % (ref 12–49)
MAGNESIUM SERPL-MCNC: 2.3 MG/DL (ref 1.6–2.4)
MCH RBC QN AUTO: 31.1 PG (ref 26–34)
MCHC RBC AUTO-ENTMCNC: 33.2 G/DL (ref 30–36.5)
MCV RBC AUTO: 93.8 FL (ref 80–99)
MONOCYTES # BLD: 0.4 K/UL (ref 0–1)
MONOCYTES NFR BLD: 10 % (ref 5–13)
NEUTS SEG # BLD: 1.6 K/UL (ref 1.8–8)
NEUTS SEG NFR BLD: 42 % (ref 32–75)
NRBC # BLD: 0 K/UL (ref 0–0.01)
NRBC BLD-RTO: 0 PER 100 WBC
PHOSPHATE SERPL-MCNC: 3.6 MG/DL (ref 2.6–4.7)
PLATELET # BLD AUTO: 185 K/UL (ref 150–400)
PMV BLD AUTO: 9.2 FL (ref 8.9–12.9)
POTASSIUM SERPL-SCNC: 3.5 MMOL/L (ref 3.5–5.1)
PROT SERPL-MCNC: 4.8 G/DL (ref 6.4–8.2)
RBC # BLD AUTO: 2.25 M/UL (ref 4.1–5.7)
SODIUM SERPL-SCNC: 142 MMOL/L (ref 136–145)
SPECIMEN EXP DATE BLD: NORMAL
STATUS OF UNIT,%ST: NORMAL
UNIT DIVISION, %UDIV: 0
WBC # BLD AUTO: 3.7 K/UL (ref 4.1–11.1)

## 2020-03-20 PROCEDURE — 0DB78ZX EXCISION OF STOMACH, PYLORUS, VIA NATURAL OR ARTIFICIAL OPENING ENDOSCOPIC, DIAGNOSTIC: ICD-10-PCS | Performed by: SPECIALIST

## 2020-03-20 PROCEDURE — 74011250637 HC RX REV CODE- 250/637: Performed by: INTERNAL MEDICINE

## 2020-03-20 PROCEDURE — 74011250636 HC RX REV CODE- 250/636: Performed by: NURSE ANESTHETIST, CERTIFIED REGISTERED

## 2020-03-20 PROCEDURE — 84100 ASSAY OF PHOSPHORUS: CPT

## 2020-03-20 PROCEDURE — 85025 COMPLETE CBC W/AUTO DIFF WBC: CPT

## 2020-03-20 PROCEDURE — 36415 COLL VENOUS BLD VENIPUNCTURE: CPT

## 2020-03-20 PROCEDURE — 76040000019: Performed by: SPECIALIST

## 2020-03-20 PROCEDURE — 76060000031 HC ANESTHESIA FIRST 0.5 HR: Performed by: SPECIALIST

## 2020-03-20 PROCEDURE — 74011250637 HC RX REV CODE- 250/637: Performed by: SPECIALIST

## 2020-03-20 PROCEDURE — 83735 ASSAY OF MAGNESIUM: CPT

## 2020-03-20 PROCEDURE — 80053 COMPREHEN METABOLIC PANEL: CPT

## 2020-03-20 PROCEDURE — 88305 TISSUE EXAM BY PATHOLOGIST: CPT

## 2020-03-20 PROCEDURE — 77030021593 HC FCPS BIOP ENDOSC BSC -A: Performed by: SPECIALIST

## 2020-03-20 PROCEDURE — 74011000250 HC RX REV CODE- 250: Performed by: NURSE ANESTHETIST, CERTIFIED REGISTERED

## 2020-03-20 RX ORDER — PANTOPRAZOLE SODIUM 40 MG/1
40 TABLET, DELAYED RELEASE ORAL
Qty: 30 TAB | Refills: 1 | Status: SHIPPED | OUTPATIENT
Start: 2020-03-21 | End: 2020-03-26 | Stop reason: SDUPTHER

## 2020-03-20 RX ORDER — LANOLIN ALCOHOL/MO/W.PET/CERES
1 CREAM (GRAM) TOPICAL
Status: DISCONTINUED | OUTPATIENT
Start: 2020-03-20 | End: 2020-03-20 | Stop reason: HOSPADM

## 2020-03-20 RX ORDER — SODIUM CHLORIDE 9 MG/ML
INJECTION, SOLUTION INTRAVENOUS
Status: DISCONTINUED | OUTPATIENT
Start: 2020-03-20 | End: 2020-03-20 | Stop reason: HOSPADM

## 2020-03-20 RX ORDER — DEXTROMETHORPHAN/PSEUDOEPHED 2.5-7.5/.8
1.2 DROPS ORAL
Status: DISCONTINUED | OUTPATIENT
Start: 2020-03-20 | End: 2020-03-20 | Stop reason: HOSPADM

## 2020-03-20 RX ORDER — FLUMAZENIL 0.1 MG/ML
0.2 INJECTION INTRAVENOUS
Status: DISCONTINUED | OUTPATIENT
Start: 2020-03-20 | End: 2020-03-20 | Stop reason: HOSPADM

## 2020-03-20 RX ORDER — PANTOPRAZOLE SODIUM 40 MG/1
40 TABLET, DELAYED RELEASE ORAL
Status: DISCONTINUED | OUTPATIENT
Start: 2020-03-20 | End: 2020-03-20 | Stop reason: HOSPADM

## 2020-03-20 RX ORDER — SODIUM CHLORIDE 9 MG/ML
50 INJECTION, SOLUTION INTRAVENOUS CONTINUOUS
Status: DISPENSED | OUTPATIENT
Start: 2020-03-20 | End: 2020-03-20

## 2020-03-20 RX ORDER — FENTANYL CITRATE 50 UG/ML
25 INJECTION, SOLUTION INTRAMUSCULAR; INTRAVENOUS AS NEEDED
Status: DISCONTINUED | OUTPATIENT
Start: 2020-03-20 | End: 2020-03-20 | Stop reason: HOSPADM

## 2020-03-20 RX ORDER — NALOXONE HYDROCHLORIDE 0.4 MG/ML
0.4 INJECTION, SOLUTION INTRAMUSCULAR; INTRAVENOUS; SUBCUTANEOUS
Status: DISCONTINUED | OUTPATIENT
Start: 2020-03-20 | End: 2020-03-20 | Stop reason: HOSPADM

## 2020-03-20 RX ORDER — LANOLIN ALCOHOL/MO/W.PET/CERES
325 CREAM (GRAM) TOPICAL
Qty: 30 TAB | Refills: 5 | Status: SHIPPED | OUTPATIENT
Start: 2020-03-20 | End: 2020-10-26 | Stop reason: ALTCHOICE

## 2020-03-20 RX ORDER — LIDOCAINE HYDROCHLORIDE 20 MG/ML
INJECTION, SOLUTION EPIDURAL; INFILTRATION; INTRACAUDAL; PERINEURAL AS NEEDED
Status: DISCONTINUED | OUTPATIENT
Start: 2020-03-20 | End: 2020-03-20 | Stop reason: HOSPADM

## 2020-03-20 RX ORDER — PROPOFOL 10 MG/ML
INJECTION, EMULSION INTRAVENOUS AS NEEDED
Status: DISCONTINUED | OUTPATIENT
Start: 2020-03-20 | End: 2020-03-20 | Stop reason: HOSPADM

## 2020-03-20 RX ORDER — GLYCOPYRROLATE 0.2 MG/ML
INJECTION INTRAMUSCULAR; INTRAVENOUS AS NEEDED
Status: DISCONTINUED | OUTPATIENT
Start: 2020-03-20 | End: 2020-03-20 | Stop reason: HOSPADM

## 2020-03-20 RX ORDER — MIDAZOLAM HYDROCHLORIDE 1 MG/ML
.25-5 INJECTION, SOLUTION INTRAMUSCULAR; INTRAVENOUS AS NEEDED
Status: DISCONTINUED | OUTPATIENT
Start: 2020-03-20 | End: 2020-03-20 | Stop reason: HOSPADM

## 2020-03-20 RX ADMIN — GLYCOPYRROLATE 0.1 MG: 0.2 INJECTION, SOLUTION INTRAMUSCULAR; INTRAVENOUS at 08:31

## 2020-03-20 RX ADMIN — SODIUM CHLORIDE: 9 INJECTION, SOLUTION INTRAVENOUS at 08:26

## 2020-03-20 RX ADMIN — VENLAFAXINE HYDROCHLORIDE 300 MG: 150 CAPSULE, EXTENDED RELEASE ORAL at 09:58

## 2020-03-20 RX ADMIN — PROPOFOL 100 MG: 10 INJECTION, EMULSION INTRAVENOUS at 08:35

## 2020-03-20 RX ADMIN — PANTOPRAZOLE SODIUM 40 MG: 40 TABLET, DELAYED RELEASE ORAL at 10:06

## 2020-03-20 RX ADMIN — LIDOCAINE HYDROCHLORIDE 100 MG: 20 INJECTION, SOLUTION INTRAVENOUS at 08:31

## 2020-03-20 RX ADMIN — Medication 10 ML: at 06:00

## 2020-03-20 RX ADMIN — ALFUZOSIN HYDROCHLORIDE 10 MG: 10 TABLET ORAL at 09:58

## 2020-03-20 NOTE — DISCHARGE INSTRUCTIONS
HOSPITALIST DISCHARGE INSTRUCTIONS  NAME: Linnette Kelly :  1965   MRN:  [id number]     Date/Time:  3/20/2020 11:03 AM    ADMIT DATE: 3/19/2020     DISCHARGE DATE: 3/20/2020     DISCHARGE DIAGNOSIS:  GI bleeding due to NSAID induced ulcer disease    MEDICATIONS:  · It is important that you take the medication exactly as they are prescribed. · Keep your medication in the bottles provided by the pharmacist and keep a list of the medication names, dosages, and times to be taken in your wallet. · Do not take other medications without consulting your doctor. Pain Management: per above medications    What to do at Home    Recommended diet:  Regular Diet    Recommended activity: Activity as tolerated    If you experience any of the following symptoms then please call your primary care physician or return to the emergency room if you cannot get hold of your doctor:  Fever, chills, nausea, vomiting, diarrhea, change in mentation, falling, bleeding, shortness of breath    Follow Up: Follow-up Information     Follow up With Specialties Details Why Contact Info    Brenden Higuera MD Internal Medicine In 2 weeks  [address], [place] Gastroenterology In 2 months  . [facility] 149  [facility] [phone]              Information obtained by :  I understand that if any problems occur once I am at home I am to contact my physician. I understand and acknowledge receipt of the instructions indicated above.                                                                                                                                            Physician's or R.N.'s Signature                                                                  Date/Time                                                                                                                                              Patient or Representative Signature                                                          Date/Time

## 2020-03-20 NOTE — PERIOP NOTES
TRANSFER - OUT REPORT:    Verbal report given to Thang Patel RN (name) on Corinne Schuller.  being transferred to [address] [place] [address] (unit) for routine progression of care       Report consisted of patients Situation, Background, Assessment and   Recommendations(SBAR). Information from the following report(s) SBAR, Procedure Summary and Recent Results was reviewed with the receiving nurse. Lines:   Peripheral IV 03/19/20 Left;Upper Arm (Active)   Site Assessment Clean, dry, & intact 3/20/2020  7:43 AM   Phlebitis Assessment 1 3/20/2020  7:43 AM   Infiltration Assessment 0 3/20/2020  7:43 AM   Dressing Status Clean, dry, & intact 3/20/2020  7:43 AM   Dressing Type Transparent 3/20/2020  7:43 AM   Hub Color/Line Status Pink;Flushed 3/20/2020  7:43 AM   Action Taken Open ports on tubing capped 3/20/2020  7:43 AM   Alcohol Cap Used Yes 3/20/2020  7:43 AM       Peripheral IV 03/19/20 Right;Posterior Forearm (Active)   Site Assessment Clean, dry, & intact 3/20/2020  7:43 AM   Phlebitis Assessment 0 3/20/2020  7:43 AM   Infiltration Assessment 0 3/20/2020  7:43 AM   Dressing Status Clean, dry, & intact 3/20/2020  7:43 AM   Dressing Type Transparent 3/20/2020  7:43 AM   Hub Color/Line Status Pink;Flushed; Infusing 3/20/2020  7:43 AM   Action Taken Open ports on tubing capped 3/20/2020  7:43 AM   Alcohol Cap Used Yes 3/20/2020  7:43 AM        Opportunity for questions and clarification was provided.       Patient transported with:   [employer]

## 2020-03-20 NOTE — PERIOP NOTES
Received Report From Erum Bunn CRNA @ 6454, see anesthesia notes. Care of the patient transferred to procedure nurse Magdalena Lynne @ 1381    Out of Procedure and sent to post-recovery @ 0011    Post-recovery report given to CORONA ALLEN RN @ 4320    Patient ABD remains soft and non-tender post procedure. Pt has no complaints at this time and tolerated the procedure well. Endoscope was pre-cleaned at bedside immediately following procedure by [employer], RN.

## 2020-03-20 NOTE — PROGRESS NOTES
Sharon Preciado  1965  [id number]    Situation:  Verbal report received from:   Fanta Alcaraz RN   Procedure: Procedure(s):  ESOPHAGOGASTRODUODENOSCOPY (EGD)  ESOPHAGOGASTRODUODENAL (EGD) BIOPSY    Background:    Preoperative diagnosis: Anemia  Postoperative diagnosis: duodenal ulcer    :  Dr. Lowe Payment   Assistant(s): Endoscopy RN-1: Jennifer Brar RN    Specimens:   ID Type Source Tests Collected by Time Destination   1 : Antrum Biopsy Preservative   Prince Colunga MD 3/20/2020 6846 Pathology     H. Pylori  no    Assessment:  Intra-procedure medications       Anesthesia gave intra-procedure sedation and medications, see anesthesia flow sheet yes    Intravenous fluids: NS@ KVO     Vital signs stable   yes    Abdominal assessment: round and soft   yes    Recommendation:  Discharge patient per MD order  Inpatient .   Return to floor  Yes   Family or Friend   N/a   Permission to share finding with family or friend yes and n/a

## 2020-03-20 NOTE — PROGRESS NOTES
Appointment Information  The following appointments have been successfully scheduled:    Date/time Thursday, March 26, 2020 09:40 AM  Patient Bernice Gray [birth year] ([id number] M) #[id number]  Department [facility] OFFICE-[place]  Appointment type Transitional Care  Provider Jorge Santana

## 2020-03-20 NOTE — PROCEDURES
[address] [place], [place]  [phone]      2020    Esophagogastroduodenoscopy (EGD) Procedure Note  Kendall Door. : 1965  [facility] Medical Record Number: [id number]      Indications:    Melena/hematochezia  Referring Physician:  Miranda Liu MD  Anesthesia/Sedation:  Conscious sedation/deep sedation/monitored anesthesia -- see notes. Endoscopist:  Dr. Chris Solorzano  Complications:  None  Estimated Blood Loss:  None    Permit:  The indications, risks, benefits and alternatives were reviewed with the patient or their decision maker who was provided an opportunity to ask questions and all questions were answered. The specific risks of esophagogastroduodenoscopy with conscious sedation were reviewed, including but not limited to anesthetic complication, bleeding, adverse drug reaction, missed lesion, infection, IV site reactions, and intestinal perforation which would lead to the need for surgical repair. Alternatives to EGD including radiographic imaging, observation without testing, or laboratory testing were reviewed as well as the limitations of those alternatives discussed. After considering the options and having all their questions answered, the patient or their decision maker provided both verbal and written consent to proceed. Procedure in Detail:  After obtaining informed consent, positioning of the patient in the left lateral decubitus position, and conduction of a pre-procedure pause or \"time out\" the endoscope was introduced into the mouth and advanced to the duodenum. A careful inspection was made, and findings or interventions are described below. Findings:   Esophagus:normal  Stomach: Mild erythematous gastropathy, cold forceps biopsies taken for histology. Hemostasis confirmed from biopsy sites.   Duodenum/jejunum: Small clean based 5mm ulcer in the duodenal bulb. Hemostatic therapy not indicated, not provided. No blood or hematin noted throughout the area of the gut examined today. Rebleeding risk considered low. Specimens: See above    Impression: Peptic ulcer disease, most likely NSAID induced based on clinical history but biopsies taken to exclude helicobacter pylori. Recommendations:  -Discharge to home  -No NSAIDS,   -PPI daily prior to breakfast,   -iron supplementation for 2 months then I would suggest reevaluate hemoglobin at that time. Thank you for entrusting me with this patient's care. Please do not hesitate to contact me with any questions or if I can be of assistance with any of your other patients' GI needs. Signed By: William Ha MD                        March 20, 2020     Surgical assistant none. Implants none unless specified.

## 2020-03-20 NOTE — ANESTHESIA PREPROCEDURE EVALUATION
Relevant Problems   No relevant active problems       Anesthetic History   No history of anesthetic complications            Review of Systems / Medical History  Patient summary reviewed, nursing notes reviewed and pertinent labs reviewed    Pulmonary  Within defined limits                 Neuro/Psych         Psychiatric history    Comments: Anxiety/depression Cardiovascular              Hyperlipidemia    Exercise tolerance: >4 METS     GI/Hepatic/Renal     GERD           Endo/Other        Anemia     Other Findings   Comments: Dizziness for months  Syncope for weeks           Physical Exam    Airway  Mallampati: I    Neck ROM: normal range of motion   Mouth opening: Normal     Cardiovascular    Rhythm: regular  Rate: normal         Dental  No notable dental hx       Pulmonary  Breath sounds clear to auscultation               Abdominal         Other Findings            Anesthetic Plan    ASA: 2  Anesthesia type: MAC            Anesthetic plan and risks discussed with: Patient      Informed consent obtained.

## 2020-03-20 NOTE — PROGRESS NOTES
Bedside and Verbal shift change report given to Isidra Lugo (oncoming nurse) by Ai Thompson RN  (offgoing nurse). Report included the following information SBAR, Kardex and MAR.

## 2020-03-20 NOTE — DISCHARGE SUMMARY
Physician Discharge Summary     Patient ID:  eKnna Wahl  [id number]  62 y.o.  1965    Admit date: 3/19/2020    Discharge date: 3/20/2020    Admission Diagnoses: GI bleed [K92.2]    Discharge Diagnoses:  Principal Diagnosis GI bleed                                            Principal Problem:    GI bleed (3/19/2020)    Active Problems:    Chronic lumbar pain ()      Overview: saw Dr. Kathleen Box; MRI 2012, s/p lumbar laminectomy 2007      GERD (gastroesophageal reflux disease) ()      BPH with obstruction/lower urinary tract symptoms (2/23/2018)      Depression with anxiety (7/13/2018)         Resolved Problems:  Problem List as of 3/20/2020 Date Reviewed: 3/19/2020          Codes Class Noted - Resolved    * (Principal) GI bleed ICD-10-CM: K92.2  ICD-9-CM: 578.9  3/19/2020 - Present        Adjustment disorder with mixed disturbance of emotions and conduct ICD-10-CM: F43.25  ICD-9-CM: 309.4  7/13/2018 - Present        Depression with anxiety (Chronic) ICD-10-CM: F41.8  ICD-9-CM: 300.4  7/13/2018 - Present        BPH with obstruction/lower urinary tract symptoms (Chronic) ICD-10-CM: N40.1, N13.8  ICD-9-CM: 600.01, 599.69  2/23/2018 - Present        Chronic lumbar pain (Chronic) ICD-10-CM: M54.5, G89.29  ICD-9-CM: 724.2, 338.29  Unknown - Present    Overview Signed 5/12/2016  2:23 PM by Daisy Adams MD     saw Dr. Kathleen Box; MRI 2012, s/p lumbar laminectomy 2007             Neck pain, chronic ICD-10-CM: M54.2, G89.29  ICD-9-CM: 723.1, 338.29  Unknown - Present    Overview Signed 5/12/2016  2:23 PM by Daisy Adams MD     seeing jillian Gates Fearing             GERD (gastroesophageal reflux disease) (Chronic) ICD-10-CM: K21.9  ICD-9-CM: 530.81  Unknown - Present        Pure hypercholesterolemia ICD-10-CM: E78.00  ICD-9-CM: 272.0  5/12/2016 - Present        Chronic low back pain without sciatica ICD-10-CM: M54.5, G89.29  ICD-9-CM: 724.2, 338.29  5/12/2016 - Present        Right cervical radiculopathy ICD-10-CM: M54.12  ICD-9-CM: 723.4  5/12/2016 - Present        RESOLVED: Hyperlipidemia ICD-10-CM: E78.5  ICD-9-CM: 272.4  Unknown - 1/20/2017    Overview Signed 5/12/2016  2:23 PM by Nancy Acevedo MD     took med briefly             RESOLVED: Gastroesophageal reflux disease without esophagitis ICD-10-CM: K21.9  ICD-9-CM: 530.81  5/12/2016 - 2/23/2018                Hospital Course:   Mr. Alvino Aldana was admitted to the Hospitalist Service on the 5th floor for treatment of GI bleed. He was made NPO, hydrated and received one unit of packed cells for symptomatic acute anemia due to GI bleeding. GI was consulted and EGD showed PUD due to NSAID use without active bleeding. Dr. Liana Michelle recommended iron replacement and PPI therapy. Patient was instructed to avoid all NSAIDs. He was discharged home on 3/20/2020 in improved condition.         PCP: Nancy Acevedo MD    Consults: GI    Discharge Exam:  Visit Vitals  /64   Pulse 66   Temp 97.6 °F (36.4 °C)   Resp 14   Ht 5' 10\" (1.778 m)   Wt 76.2 kg (168 lb)   SpO2 100%   BMI 24.11 kg/m²      Gen: Well-developed, well-nourished, in no acute distress  HEENT:  Pink conjunctivae, PERRL, hearing intact to voice, moist mucous membranes  Neck: Supple, without masses, thyroid non-tender  Resp: No accessory muscle use, clear breath sounds without wheezes rales or rhonchi  Card: No murmurs, normal S1, S2 without thrills, bruits or peripheral edema  Abd:  Soft, non-tender, non-distended, normoactive bowel sounds are present, no palpable organomegaly and no detectable hernias  Lymph:  No cervical or inguinal adenopathy  Musc: No cyanosis or clubbing  Skin: No rashes or ulcers, skin turgor is good  Neuro:  Cranial nerves are grossly intact, no focal motor weakness, follows commands appropriately  Psych:  Good insight, oriented to person, place and time, alert         Disposition: home    Patient Instructions:   Current Discharge Medication List      START taking these medications Details   ferrous sulfate 325 mg (65 mg iron) tablet Take 1 Tab by mouth daily (with breakfast). Qty: 30 Tab, Refills: 5      pantoprazole (PROTONIX) 40 mg tablet Take 1 Tab by mouth Daily (before breakfast). Qty: 30 Tab, Refills: 1         CONTINUE these medications which have NOT CHANGED    Details   zolpidem (AMBIEN) 10 mg tablet Take 5 mg by mouth nightly. traZODone (DESYREL) 100 mg tablet Take 100 mg by mouth nightly. alfuzosin SR (UROXATRAL) 10 mg SR tablet TAKE 1 TABLET BY MOUTH EVERY DAY  Qty: 90 Tab, Refills: 1      simvastatin (ZOCOR) 20 mg tablet TAKE 1 TABLET BY MOUTH ONE TIME EVERY NIGHT  Qty: 90 Tab, Refills: 1    Associated Diagnoses: Pure hypercholesterolemia      Venlafaxine 150 mg tr24 Take 300 mg by mouth daily. Activity: Activity as tolerated  Diet: Regular Diet  Wound Care: None needed    Follow-up Information     Follow up With Specialties Details Why Contact Info    Stephanie Bray MD Internal Medicine In 2 weeks  [address] [place] [address] 99 [address]      Bethany Warner MD Gastroenterology In 2 months  [address] [place], [place] [phone]            35 minutes were spent on this discharge.     Signed:  Jennifer Melvin MD  3/20/2020  11:03 AM

## 2020-03-20 NOTE — PERIOP NOTES
Junior Antonio  1965  [id number]    Situation:    Scheduled Procedure: Procedure(s):  ESOPHAGOGASTRODUODENOSCOPY (EGD)  Verbal report received from: Lopez Hathaway RN  Preoperative diagnosis: Anemia    Background:    Procedure: Procedure(s):  ESOPHAGOGASTRODUODENOSCOPY (EGD)  Physician performing procedure; Dr. Pepe Quiroz RN    NPO Status/Last PO Intake: midnight    Pregnancy Test:Not applicable If yes, result: none    Is the patient taking Blood Thinners: NO If yes, list: n/a and last taken n/a  Is the patient diabetic:no       If yes, what was the last BS:  n/a  Time taken?  n/a  Anything given? n/a          Does the patient have a Pacemaker/Defibrillator in place?: no   Does the patient need antibiotics before/during/after procedure: no   If the patient is having a colon, How much prep was drank? n/a   What were the Colon prep results? n/a   Does the patient have SCD in place:no   Is patient on CONTACT precautions:no        If yes, what kind of CONTACT precautions: n/a    Assessment:  Are the vital signs stable prior to patient coming to ENDO?  yes  Is the patient alert/oriented and able to sign consent for the procedures:yes  How does the patient's abdomen feel prior to coming to ENDO? Will assess upon patient to unit   Does the patient have a patient IV in place?  yes     Recommendation:  Family or Friend present no     Permission to share finding with Family or Friend n/a

## 2020-03-20 NOTE — ANESTHESIA POSTPROCEDURE EVALUATION
Procedure(s):  ESOPHAGOGASTRODUODENOSCOPY (EGD)  ESOPHAGOGASTRODUODENAL (EGD) BIOPSY. MAC    Anesthesia Post Evaluation      Multimodal analgesia: multimodal analgesia not used between 6 hours prior to anesthesia start to PACU discharge  Patient location during evaluation: PACU  Patient participation: complete - patient participated  Level of consciousness: awake and alert  Pain score: 0  Pain management: adequate  Airway patency: patent  Anesthetic complications: no  Cardiovascular status: hemodynamically stable and acceptable  Respiratory status: acceptable  Hydration status: acceptable  Comments: Patient seen and evaluated; no concerns. Post anesthesia nausea and vomiting:  none      Vitals Value Taken Time   /63 3/20/2020  9:13 AM   Temp 36.4 °C (97.6 °F) 3/20/2020  8:48 AM   Pulse 66 3/20/2020  9:14 AM   Resp 10 3/20/2020  9:14 AM   SpO2 100 % 3/20/2020  9:14 AM   Vitals shown include unvalidated device data.

## 2020-03-20 NOTE — PROGRESS NOTES
Pharmacist Discharge Medication Reconciliation    Discharge Provider:  Dr. Darby Pretty       Discharge Medications:      My Medications        START taking these medications        Instructions Each Dose to Equal Morning Noon Evening Bedtime   ferrous sulfate 325 mg (65 mg iron) tablet    Your last dose was: Your next dose is: Take 1 Tab by mouth daily (with breakfast). 325 mg                 pantoprazole 40 mg tablet  Commonly known as:  PROTONIX  Start taking on:  March 21, 2020    Your last dose was: Your next dose is: Take 1 Tab by mouth Daily (before breakfast). 40 mg                        CONTINUE taking these medications        Instructions Each Dose to Equal Morning Noon Evening Bedtime   alfuzosin SR 10 mg SR tablet  Commonly known as:  UROXATRAL    Your last dose was: Your next dose is:          TAKE 1 TABLET BY MOUTH EVERY DAY                  simvastatin 20 mg tablet  Commonly known as:  ZOCOR    Your last dose was: Your next dose is:          TAKE 1 TABLET BY MOUTH ONE TIME EVERY NIGHT                  traZODone 100 mg tablet  Commonly known as:  DESYREL    Your last dose was: Your next dose is: Take 100 mg by mouth nightly. 100 mg                 Venlafaxine-ER 24  mg Tr24 tablet  Commonly known as:  EFFEXOR-ER    Your last dose was: Your next dose is: Take 300 mg by mouth daily. 300 mg                 zolpidem 10 mg tablet  Commonly known as:  AMBIEN    Your last dose was: Your next dose is: Take 5 mg by mouth nightly.    5 mg                           Where to Get Your Medications        These medications were sent to [facility], [address] [place] [address] Drive, [facility] [place] [address]      Phone:  [phone]   ferrous sulfate 325 mg (65 mg iron) tablet  pantoprazole 40 mg tablet       Pantoprazole and ferrous sulfate prescribed upon discharge for GIB  Patient advised to avoid all NSAIDS     The patient's chart, MAR, and AVS were reviewed by   Ofe Rivera, [address],   Contact: [phone]

## 2020-03-26 ENCOUNTER — TELEPHONE (OUTPATIENT)
Dept: INTERNAL MEDICINE CLINIC | Age: 55
End: 2020-03-26

## 2020-03-26 ENCOUNTER — OFFICE VISIT (OUTPATIENT)
Dept: INTERNAL MEDICINE CLINIC | Age: 55
End: 2020-03-26

## 2020-03-26 VITALS
HEART RATE: 83 BPM | WEIGHT: 167.6 LBS | HEIGHT: 70 IN | TEMPERATURE: 97.7 F | DIASTOLIC BLOOD PRESSURE: 70 MMHG | BODY MASS INDEX: 23.99 KG/M2 | OXYGEN SATURATION: 98 % | RESPIRATION RATE: 12 BRPM | SYSTOLIC BLOOD PRESSURE: 102 MMHG

## 2020-03-26 DIAGNOSIS — K27.9 PUD (PEPTIC ULCER DISEASE): Primary | ICD-10-CM

## 2020-03-26 DIAGNOSIS — R51.9 NONINTRACTABLE HEADACHE, UNSPECIFIED CHRONICITY PATTERN, UNSPECIFIED HEADACHE TYPE: ICD-10-CM

## 2020-03-26 DIAGNOSIS — D50.0 IRON DEFICIENCY ANEMIA DUE TO CHRONIC BLOOD LOSS: ICD-10-CM

## 2020-03-26 DIAGNOSIS — K59.00 CONSTIPATION, UNSPECIFIED CONSTIPATION TYPE: ICD-10-CM

## 2020-03-26 DIAGNOSIS — K29.71 GASTROINTESTINAL HEMORRHAGE ASSOCIATED WITH GASTRITIS, UNSPECIFIED GASTRITIS TYPE: ICD-10-CM

## 2020-03-26 DIAGNOSIS — M25.649 STIFFNESS OF HAND JOINT, UNSPECIFIED LATERALITY: ICD-10-CM

## 2020-03-26 DIAGNOSIS — A04.8 H. PYLORI INFECTION: ICD-10-CM

## 2020-03-26 LAB — HGB BLD-MCNC: 10.6 G/DL

## 2020-03-26 RX ORDER — PANTOPRAZOLE SODIUM 40 MG/1
40 TABLET, DELAYED RELEASE ORAL
Qty: 30 TAB | Refills: 1
Start: 2020-03-26 | End: 2020-10-26 | Stop reason: ALTCHOICE

## 2020-03-26 RX ORDER — BISMUTH SUBCITRATE POTASSIUM, METRONIDAZOLE, TETRACYCLINE HYDROCHLORIDE 140; 125; 125 MG/1; MG/1; MG/1
3 CAPSULE ORAL 3 TIMES DAILY
Qty: 90 CAP | Refills: 0
Start: 2020-03-26 | End: 2020-04-05

## 2020-03-26 NOTE — TELEPHONE ENCOUNTER
Patient was seen in the office today. His upper endoscopy done by Dr. Tian Carpio in the hospital on March 20 shows the presence of H. pylori bacteria in the stomach. Please contact Dr. Tian Carpio office to confirm that they will be treating this themselves. If they would like me to treated, please let me know what they prefer to treated with.

## 2020-03-26 NOTE — PROGRESS NOTES
HISTORY OF PRESENT ILLNESS    Chief Complaint   Patient presents with   [facility] for Transitional care management (TCM) visit s/p of hospital admission from 03/19 until 03/20/2020 at [facility].  Patient was not contacted by nurse navigator, so cannot bill CELESTINO code. Hospital record and relevant lab results, test results and consult notes have personally been reviewed by me at this office visit. Medications reviewed and reconciled. Patient was hospitalized for symptomatic anemia with a hemoglobin of 6.9 and significant fatigue, dizziness, orthostatic symptoms. Repeat hemoglobin was 7.0 when he went to the hospital.  He was transfused 1 unit of packed red blood cells while in the hospital and tolerated well. GI Dr. Ruthie Yates was consulted. Endoscopy was performed on March 20 which showed mildly erythematous gastropathy in the stomach and a small clean 5 mm ulcer in the duodenal bulb. No blood or hematin noted throughout the gut or from the ulcer. Was diagnosed with peptic ulcer disease, most likely NSAID induced. Biopsy just returned positive for H. pylori forms. Patient has not been  contacted about this yet by GI. He was started on pantoprazole 40 mg daily and iron 325 mg daily. Patient had a colonoscopy October 4, 2019 which showed only a 4 mm polyp. Repeat hemoglobin prior to discharge was still 7.0. Today, patient states that he is feeling a little bit less dizzy, but is still somewhat dizzy when standing. He is not especially worried about this, but his wife it. He continues not to drive. No syncope. Still feels significant fatigue. Also mentions several other symptoms of cold intolerance, paresthesias of both hands at times, throbbing headache, especially after he stands up. These are relatively new over the past month or 2. He feels some stiffness of his bilateral hands.   Extensive work-up for hand paresthesia on the right which showed a normal EMG.  He has been having ongoing constipation for several months. Says he has not had a bowel movement in the past 6 days since he left the hospital.  This was ongoing prior to starting iron and pantoprazole. His wife started giving him senna over the past 2 days and eating plenty of fluids including oatmeal and meat. Has not tried any other products. Constipation. Review of Systems   All other systems reviewed and are negative, except as noted in HPI    Past Medical and Surgical History   has a past medical history of Adjustment disorder with mixed disturbance of emotions and conduct (7/13/2018), BPH with obstruction/lower urinary tract symptoms (08/2017), Chronic low back pain without sciatica, Chronic lumbar pain, GERD (gastroesophageal reflux disease), Neck pain, chronic, PUD (peptic ulcer disease) (03/20/2020), and Pure hypercholesterolemia. has a past surgical history that includes hx lumbar laminectomy (3/5/97); hx vasectomy (2002); hx endoscopy (11/8/10); hx colonoscopy (10/8/07); hx colonoscopy (10/24/2019); and hx endoscopy (03/20/2020). reports that he has quit smoking. He has never used smokeless tobacco. He reports current alcohol use. He reports that he does not use drugs. family history is not on file. Physical Exam   Nursing note and vitals reviewed. Blood pressure 102/70, pulse 83, temperature 97.7 °F (36.5 °C), temperature source Oral, resp. rate 12, height 5' 10\" (1.778 m), weight 167 lb 9.6 oz (76 kg), SpO2 98 %. Constitutional:  No distress. Eyes: Conjunctivae w mild pallor. Ears:  Hearing grossly intact  Cardiovascular: Normal rate. regular rhythm, no murmurs or gallops  No edema  Pulmonary/Chest: Effort normal.   CTAB  Musculoskeletal: moves all 4 extremities   Neurological: Alert and oriented to person, place, and time. Skin: No rash noted. Psychiatric: Normal mood and affect.  Behavior is normal.     ASSESSMENT and PLAN  Diagnoses and all orders for this visit:    He is currently working from home and is back to full duty as far as work responsibilities. I advised that he not drive for now until at least April 6 because of orthostatic dizziness and will allow his blood cell counts to come up further. Once I get his repeat labs in ~2 weeks, he likely will be cleared to full duty including driving if needed for work. He specifically requested that I not write another letter for work today. 1. PUD (peptic ulcer disease)  2. H. pylori infection  3. Gastrointestinal hemorrhage associated with gastritis, unspecified gastritis type  No evidence of bleeding based on recent endoscopy. Presumed gastropathy causing slow bleeding and a recent ulcer which appears to be healing. Agree with pantoprazole for 60 days as prescribed and avoid any NSAIDs. We will reach out to Dr. Avery Swanson office to see if they are planning on treating his H. pylori and if you need any help with that. Repeat labs in 2 weeks. Continue iron supplement for now. If counts are good in 2 weeks, can stop. -     pantoprazole (PROTONIX) 40 mg tablet; Take 1 Tab by mouth Daily (before breakfast). -     AMB POC HEMOGLOBIN (HGB)  -     CBC W/O DIFF; Future  -     FERRITIN; Future  -     IRON PROFILE; Future    4. Iron deficiency anemia due to chronic blood loss  Continue iron supplement for now. If counts are good in 2 weeks, can stop. 5. Constipation, unspecified constipation type  Ongoing constipation prior to starting iron, but is still relatively significant. Advised to use milk of magnesia today and also consider an enema or suppository to get bowel movements going. Then continue senna nightly while on iron. Can add MiraLAX daily as well to prevent further significant constipation. Effexor may be contributing. 6. Nonintractable headache, unspecified chronicity pattern, unspecified headache type nonspecific, likely secondary to anemia and orthostatic symptoms. Will monitor for now.   To be a side effect of Effexor, but is often used for chronic headaches. 7. Stiffness of hand joint, unspecified laterality  Also nonspecific. Will monitor this symptom for now. Avoid NSAIDs. lab results and schedule of future lab studies reviewed with patient  reviewed medications and side effects in detail    Return to clinic for further evaluation if new symptoms develop        Current Outpatient Medications   Medication Sig    sennosides (SENNA-C PO) Take  by mouth.  pantoprazole (PROTONIX) 40 mg tablet Take 1 Tab by mouth Daily (before breakfast).  ferrous sulfate 325 mg (65 mg iron) tablet Take 1 Tab by mouth daily (with breakfast).  zolpidem (AMBIEN) 10 mg tablet Take 5 mg by mouth nightly.  traZODone (DESYREL) 100 mg tablet Take 100 mg by mouth nightly.  alfuzosin SR (UROXATRAL) 10 mg SR tablet TAKE 1 TABLET BY MOUTH EVERY DAY    simvastatin (ZOCOR) 20 mg tablet TAKE 1 TABLET BY MOUTH ONE TIME EVERY NIGHT    Venlafaxine 150 mg tr24 Take 300 mg by mouth daily. No current facility-administered medications for this visit.

## 2020-03-26 NOTE — TELEPHONE ENCOUNTER
Dr. Kamron Kearney gave pt Pylera 3 caps PO 4 times a day x 10 days. RX was sent to the pharmacy today. Breath test 2-4 wks.

## 2020-04-22 LAB
ERYTHROCYTE [DISTWIDTH] IN BLOOD BY AUTOMATED COUNT: 12.7 % (ref 11.6–15.4)
FERRITIN SERPL-MCNC: 113 NG/ML (ref 30–400)
HCT VFR BLD AUTO: 37.9 % (ref 37.5–51)
HGB BLD-MCNC: 13.1 G/DL (ref 13–17.7)
IRON SATN MFR SERPL: 26 % (ref 15–55)
IRON SERPL-MCNC: 68 UG/DL (ref 38–169)
MCH RBC QN AUTO: 32.6 PG (ref 26.6–33)
MCHC RBC AUTO-ENTMCNC: 34.6 G/DL (ref 31.5–35.7)
MCV RBC AUTO: 94 FL (ref 79–97)
PLATELET # BLD AUTO: 264 X10E3/UL (ref 150–450)
RBC # BLD AUTO: 4.02 X10E6/UL (ref 4.14–5.8)
TIBC SERPL-MCNC: 265 UG/DL (ref 250–450)
UIBC SERPL-MCNC: 197 UG/DL (ref 111–343)
WBC # BLD AUTO: 3.8 X10E3/UL (ref 3.4–10.8)

## 2020-05-27 ENCOUNTER — APPOINTMENT (OUTPATIENT)
Dept: GENERAL RADIOLOGY | Age: 55
End: 2020-05-27
Attending: INTERNAL MEDICINE

## 2020-08-05 RX ORDER — ALFUZOSIN HYDROCHLORIDE 10 MG/1
TABLET, EXTENDED RELEASE ORAL
Qty: 90 TAB | Refills: 1 | Status: SHIPPED | OUTPATIENT
Start: 2020-08-05 | End: 2020-12-20

## 2020-08-10 DIAGNOSIS — N40.1 BPH WITH OBSTRUCTION/LOWER URINARY TRACT SYMPTOMS: Chronic | ICD-10-CM

## 2020-08-10 DIAGNOSIS — R53.82 CHRONIC FATIGUE: ICD-10-CM

## 2020-08-10 DIAGNOSIS — N13.8 BPH WITH OBSTRUCTION/LOWER URINARY TRACT SYMPTOMS: Chronic | ICD-10-CM

## 2020-08-10 DIAGNOSIS — K29.71 GASTROINTESTINAL HEMORRHAGE ASSOCIATED WITH GASTRITIS, UNSPECIFIED GASTRITIS TYPE: ICD-10-CM

## 2020-08-10 DIAGNOSIS — F43.25 ADJUSTMENT DISORDER WITH MIXED DISTURBANCE OF EMOTIONS AND CONDUCT: Primary | ICD-10-CM

## 2020-08-10 DIAGNOSIS — R79.89 DECREASED FREE TESTOSTERONE LEVEL IN MALE: ICD-10-CM

## 2020-08-10 DIAGNOSIS — E78.00 PURE HYPERCHOLESTEROLEMIA: ICD-10-CM

## 2020-08-20 LAB
ALBUMIN SERPL-MCNC: 4.2 G/DL (ref 3.8–4.9)
ALBUMIN/GLOB SERPL: 2 {RATIO} (ref 1.2–2.2)
ALP SERPL-CCNC: 59 IU/L (ref 39–117)
ALT SERPL-CCNC: 36 IU/L (ref 0–44)
AST SERPL-CCNC: 28 IU/L (ref 0–40)
BILIRUB SERPL-MCNC: 0.6 MG/DL (ref 0–1.2)
BUN SERPL-MCNC: 14 MG/DL (ref 6–24)
BUN/CREAT SERPL: 13 (ref 9–20)
CALCIUM SERPL-MCNC: 9.3 MG/DL (ref 8.7–10.2)
CHLORIDE SERPL-SCNC: 105 MMOL/L (ref 96–106)
CHOLEST SERPL-MCNC: 181 MG/DL (ref 100–199)
CO2 SERPL-SCNC: 23 MMOL/L (ref 20–29)
CREAT SERPL-MCNC: 1.04 MG/DL (ref 0.76–1.27)
ERYTHROCYTE [DISTWIDTH] IN BLOOD BY AUTOMATED COUNT: 13.7 % (ref 11.6–15.4)
GLOBULIN SER CALC-MCNC: 2.1 G/DL (ref 1.5–4.5)
GLUCOSE SERPL-MCNC: 83 MG/DL (ref 65–99)
HCT VFR BLD AUTO: 40.3 % (ref 37.5–51)
HDLC SERPL-MCNC: 52 MG/DL
HGB BLD-MCNC: 13.5 G/DL (ref 13–17.7)
INTERPRETATION, 910389: NORMAL
IRON SATN MFR SERPL: 43 % (ref 15–55)
IRON SERPL-MCNC: 114 UG/DL (ref 38–169)
LDLC SERPL CALC-MCNC: 110 MG/DL (ref 0–99)
MCH RBC QN AUTO: 30.5 PG (ref 26.6–33)
MCHC RBC AUTO-ENTMCNC: 33.5 G/DL (ref 31.5–35.7)
MCV RBC AUTO: 91 FL (ref 79–97)
PLATELET # BLD AUTO: 199 X10E3/UL (ref 150–450)
POTASSIUM SERPL-SCNC: 4.5 MMOL/L (ref 3.5–5.2)
PROT SERPL-MCNC: 6.3 G/DL (ref 6–8.5)
PSA SERPL-MCNC: 3.3 NG/ML (ref 0–4)
RBC # BLD AUTO: 4.43 X10E6/UL (ref 4.14–5.8)
REFLEX CRITERIA: NORMAL
SODIUM SERPL-SCNC: 142 MMOL/L (ref 134–144)
TESTOST SERPL-MCNC: 316 NG/DL (ref 264–916)
TIBC SERPL-MCNC: 267 UG/DL (ref 250–450)
TRIGL SERPL-MCNC: 96 MG/DL (ref 0–149)
TSH SERPL DL<=0.005 MIU/L-ACNC: 4.08 UIU/ML (ref 0.45–4.5)
UIBC SERPL-MCNC: 153 UG/DL (ref 111–343)
VLDLC SERPL CALC-MCNC: 19 MG/DL (ref 5–40)
WBC # BLD AUTO: 4.1 X10E3/UL (ref 3.4–10.8)

## 2020-10-03 DIAGNOSIS — E78.00 PURE HYPERCHOLESTEROLEMIA: ICD-10-CM

## 2020-10-04 RX ORDER — SIMVASTATIN 20 MG/1
TABLET, FILM COATED ORAL
Qty: 90 TAB | Refills: 1 | Status: SHIPPED | OUTPATIENT
Start: 2020-10-04 | End: 2021-02-17

## 2020-10-26 ENCOUNTER — OFFICE VISIT (OUTPATIENT)
Dept: INTERNAL MEDICINE CLINIC | Age: 55
End: 2020-10-26
Payer: COMMERCIAL

## 2020-10-26 ENCOUNTER — TELEPHONE (OUTPATIENT)
Dept: INTERNAL MEDICINE CLINIC | Age: 55
End: 2020-10-26

## 2020-10-26 VITALS
TEMPERATURE: 98.1 F | OXYGEN SATURATION: 97 % | DIASTOLIC BLOOD PRESSURE: 79 MMHG | HEART RATE: 76 BPM | RESPIRATION RATE: 14 BRPM | HEIGHT: 70 IN | BODY MASS INDEX: 26.63 KG/M2 | WEIGHT: 186 LBS | SYSTOLIC BLOOD PRESSURE: 118 MMHG

## 2020-10-26 DIAGNOSIS — K29.71 GASTROINTESTINAL HEMORRHAGE ASSOCIATED WITH GASTRITIS, UNSPECIFIED GASTRITIS TYPE: ICD-10-CM

## 2020-10-26 DIAGNOSIS — M51.36 DDD (DEGENERATIVE DISC DISEASE), LUMBAR: Primary | ICD-10-CM

## 2020-10-26 DIAGNOSIS — F41.8 DEPRESSION WITH ANXIETY: ICD-10-CM

## 2020-10-26 PROCEDURE — 99214 OFFICE O/P EST MOD 30 MIN: CPT | Performed by: NURSE PRACTITIONER

## 2020-10-26 RX ORDER — DEXTROMETHORPHAN HYDROBROMIDE, GUAIFENESIN 5; 100 MG/5ML; MG/5ML
650 LIQUID ORAL EVERY 8 HOURS
COMMUNITY

## 2020-10-26 RX ORDER — QUETIAPINE FUMARATE 200 MG/1
300 TABLET, FILM COATED ORAL
COMMUNITY
Start: 2020-10-03 | End: 2021-11-23 | Stop reason: ALTCHOICE

## 2020-10-26 RX ORDER — METHYLPREDNISOLONE 4 MG/1
4 TABLET ORAL
Qty: 1 DOSE PACK | Refills: 0 | Status: SHIPPED | OUTPATIENT
Start: 2020-10-26 | End: 2021-11-23 | Stop reason: ALTCHOICE

## 2020-10-26 RX ORDER — HYDROCODONE BITARTRATE AND ACETAMINOPHEN 5; 325 MG/1; MG/1
1 TABLET ORAL
Qty: 30 TAB | Refills: 0 | Status: SHIPPED | OUTPATIENT
Start: 2020-10-26 | End: 2020-11-02

## 2020-10-26 NOTE — PATIENT INSTRUCTIONS
Herniated Disc: Exercises Introduction Here are some examples of exercises for you to try. The exercises may be suggested for a condition or for rehabilitation. Start each exercise slowly. Ease off the exercises if you start to have pain. You will be told when to start these exercises and which ones will work best for you. How to stay safe These exercises can help you move easier and feel better. But when you first start doing them, you may have more pain in your back. This is normal. But it is important to pay close attention to your pain during and after each exercise. · Keep doing these exercises if your pain stays the same or moves from your leg and buttock more toward the middle of your spine. Pain moving out of your leg and buttock is a good sign. · Stop doing these exercises if your pain gets worse in your leg and buttock. Stop if you start to have pain in your leg and buttock that you didn't have before. Be sure to do these exercises in the order they appear. Note how your pain changes before you move to the next one. If your pain is much worse right after exercise and stays worse the next day, do not do any of these exercises. How to do the exercises 1. Rest on belly 1. Lie on your stomach, with your head turned to the side. 2. Try to relax your lower back muscles as much as you can. 3. Continue to lie on your stomach for 2 minutes. · Keep your arms beside your body. · If that position bothers your neck, place your hands, one on top of the other, underneath your forehead. This will help support your head and neck. If lying in this position causes or increases pain down your leg, stop this exercise and do not do the next exercises. 2. Press-up back extension 1. Lie on your stomach, with your face down and your elbows tucked into your sides and under your shoulders. 2. Press your elbows down into the floor to raise your upper back. 3. Hold this position for 15 to 30 seconds. 4. Repeat 2 to 4 times. · As you do this, relax your stomach muscles and allow your back to arch without using your back muscles. · Let your low back relax completely as you arch up. Don't let your hips or pelvis come off the floor. Then relax, and return to the start position. Over time, work up to staying in the press-up position for up to 2 minutes. If lying in this position causes or increases pain down your leg, stop this exercise and do not do the next exercises. 3. Full press-up back extension 1. Lie on your stomach with your face down, keeping your elbows tucked into your sides and under your shoulders. 2. Straighten your elbows, and push your upper body up as far as you can. 3. Hold this position for 5 seconds, and then relax. 4. Repeat 10 times. Allow your lower back to sag. Keep your hips, pelvis, and legs relaxed. Each time, try to raise your upper body a little higher and hold your arms a bit straighter. If lying in this position causes or increases pain down your leg, stop this exercise and do not do the next exercises. If you can't do this exercise, you may instead try the backward bend exercise that follows. 4. Backward bend 1. Stand with your feet hip-width apart, and don't lock your knees. 2. Place your hands in the small of your back. 3. Bend backward as far as you can, keeping your knees straight. 4. Repeat 2 to 4 times. Your toes should be pointing forward. Hold this position for 2 to 3 seconds. Then return to your starting position. Each time, try to bend backward a little farther, until you bend backward as far as you can. If standing in this position causes or increases pain down your leg, stop this exercise. Follow-up care is a key part of your treatment and safety. Be sure to make and go to all appointments, and call your doctor if you are having problems. It's also a good idea to know your test results and keep a list of the medicines you take. Where can you learn more? Go to http://www.gray.com/ Enter [phone] in the search box to learn more about \"Herniated Disc: Exercises. \" Current as of: March 2, 2020               Content Version: 12.6 © 8223-3940 [employer]. Care instructions adapted under license by [employer] (which disclaims liability or warranty for this information). If you have questions about a medical condition or this instruction, always ask your healthcare professional. [place] [address] any warranty or liability for your use of this information.

## 2020-10-26 NOTE — PROGRESS NOTES
Bishop Abreu is a 60 y.o. male    Chief Complaint   Patient presents with    Back Pain     x9 days ago- lower back pain     Pt takes tylenol and motrin without any relief. Health Maintenance Due   Topic Date Due    DTaP/Tdap/Td series (1 - Tdap) 09/05/1984    Shingrix Vaccine Age 50> (1 of 2) 09/05/2013    Flu Vaccine (1) 09/01/2020       Visit Vitals  /79 (BP 1 Location: Left arm, BP Patient Position: Sitting)   Pulse 76   Temp 98.1 °F (36.7 °C) (Oral)   Resp 14   Ht 5' 10\" (1.778 m)   Wt 186 lb (84.4 kg)   SpO2 97%   BMI 26.69 kg/m²       3 most recent PHQ Screens 3/26/2020   PHQ Not Done -   Little interest or pleasure in doing things Nearly every day   Feeling down, depressed, irritable, or hopeless Nearly every day   Total Score PHQ 2 6         Abuse Screening Questionnaire 3/26/2020   Do you ever feel afraid of your partner? N   Are you in a relationship with someone who physically or mentally threatens you? N   Is it safe for you to go home? Y         1. Have you been to the ER, urgent care clinic since your last visit? Hospitalized since your last visit? No    2. Have you seen or consulted any other health care providers outside of the [address] [place] [address] since your last visit? Include any pap smears or colon screening.  No

## 2020-10-26 NOTE — TELEPHONE ENCOUNTER
Appt for MRI schedule for Wednesday 10/28/2020 at 730am, arrive at [address] [facility] location. Pt was advised.

## 2020-10-26 NOTE — LETTER
NOTIFICATION RETURN TO WORK / SCHOOL 
 
10/26/2020 9:32 AM 
 
Mr. Chloe Merlos. 
[address] [facility] [id number] To Whom It May Concern: 
 
Chloe Merlos. is currently under the care of [address] [place] [address],[address]. He was seen in office today for flare of lower back pain and has been advised to get up and move around every hour during workday. If there are questions or concerns please have the patient contact our office.  
 
 
 
Sincerely, 
 
 
Deion Zhu NP

## 2020-10-26 NOTE — PROGRESS NOTES
HISTORY OF PRESENT ILLNESS  Sari Rodriguez is a 60 y.o. male. HPI   Patient of Dr MURO [facility] SHARON who presents with complaints of flare of lower back pain that has been persistent over the past 9 days. Reports he was lifting lumber and felt a sudden pain in his lower back. Since then he has had persistent pain over middle of spine and has had 2 episodes of involuntary loss of bladder control while lying supine. Has not noted much radiation of pain down legs and denies numbness/tingling into feet. Has history of chronic lower back pain and had L5-S1 laminectomy in 2007. Reports he was told that he may require further surgery in the future. Has seen pain management in the past and has had SOPHIE. Last MRI in 2016 showed moderate severe L5-S1 degenerative disc disease with left lateral recess stenosis and moderate left and mild-to-moderate right neural foraminal narrowing. Degenerative changes at L4-5 causing mild to moderate central stenosis with left lateral recess stenosis and mild left neural foraminal narrowing. He has been taking Ibuprofen and Tylenol for current flare without improvement and did not want to continue with NSAID use since he had GI bleed in March 2020. Reports he has not been able to get comfortable and back pain has been disturbing his sleep. Reports he works from home and has not been able to sit still for more than 1 hour at a time and has to get up and move around. Requesting letter for employer. Under care of psychiatry for depression/anxiety and is currently on Seroquel only.        Patient Active Problem List   Diagnosis Code    Chronic lumbar pain M54.5, G89.29    Neck pain, chronic M54.2, G89.29    GERD (gastroesophageal reflux disease) K21.9    Pure hypercholesterolemia E78.00    Chronic low back pain without sciatica M54.5, G89.29    Right cervical radiculopathy M54.12    BPH with obstruction/lower urinary tract symptoms N40.1, N13.8    Adjustment disorder with mixed disturbance of emotions and conduct F43.25    Depression with anxiety F41.8    GI bleed K92.2    PUD (peptic ulcer disease) K27.9     Past Surgical History:   Procedure Laterality Date    HX COLONOSCOPY  10/8/07    normal, hemorrohoids    HX COLONOSCOPY  10/24/2019    4 mm polyp. Dr. Roosevelt Holstein.  HX ENDOSCOPY  11/8/10    mild gastritis    HX ENDOSCOPY  03/20/2020    Dr. Roosevelt Holstein.    5mm ulcer in the duodenal bulb, gastropathy +H pylori    HX LUMBAR LAMINECTOMY  3/5/97    L5- S1 w L5 L disc extrusion  in [place]    HX VASECTOMY  2002     Social History     Socioeconomic History    Marital status:      Spouse name: Elzbieta Bhardwaj Number of children: 1    Years of education: Not on file    Highest education level: Not on file   Occupational History    Occupation: [employer]   Social Needs    Financial resource strain: Not on file    Food insecurity     Worry: Not on file     Inability: Not on file   [employer] needs     Medical: Not on file     Non-medical: Not on file   Tobacco Use    Smoking status: Former Smoker    Smokeless tobacco: Never Used   Substance and Sexual Activity    Alcohol use: Yes     Comment: 1-4 per month    Drug use: No    Sexual activity: Yes   Lifestyle    Physical activity     Days per week: Not on file     Minutes per session: Not on file    Stress: Not on file   Relationships    Social connections     Talks on phone: Not on file     Gets together: Not on file     Attends Scientologist service: Not on file     Active member of club or organization: Not on file     Attends meetings of clubs or organizations: Not on file     Relationship status: Not on file    Intimate partner violence     Fear of current or ex partner: Not on file     Emotionally abused: Not on file     Physically abused: Not on file     Forced sexual activity: Not on file   Other Topics Concern    Not on file   Social History Narrative    1 son, age 12 (10/2018)     Family History Problem Relation Age of Onset    Heart Disease Neg Hx     Heart Attack Neg Hx     Cancer Neg Hx     Asthma Neg Hx      Current Outpatient Medications   Medication Sig    QUEtiapine (SEROquel) 200 mg tablet TAKE 1 TABLET BY MOUTH EVERY EVENING    acetaminophen (Tylenol Arthritis Pain) 650 mg TbER Take 650 mg by mouth every eight (8) hours.  methylPREDNISolone (MEDROL DOSEPACK) 4 mg tablet Take 1 Tab by mouth Specific Days and Specific Times. Take as directed    HYDROcodone-acetaminophen (NORCO) 5-325 mg per tablet Take 1 Tab by mouth every four (4) hours as needed for Pain for up to 7 days. Max Daily Amount: 6 Tabs.  simvastatin (ZOCOR) 20 mg tablet TAKE 1 TABLET BY MOUTH EVERY DAY AT NIGHT    alfuzosin SR (UROXATRAL) 10 mg SR tablet TAKE 1 TABLET BY MOUTH EVERY DAY    zolpidem (AMBIEN) 10 mg tablet Take 5 mg by mouth nightly.  sennosides (SENNA-C PO) Take  by mouth.  pantoprazole (PROTONIX) 40 mg tablet Take 1 Tab by mouth Daily (before breakfast).  ferrous sulfate 325 mg (65 mg iron) tablet Take 1 Tab by mouth daily (with breakfast).  traZODone (DESYREL) 100 mg tablet Take 100 mg by mouth nightly.  Venlafaxine 150 mg tr24 Take 300 mg by mouth daily. No current facility-administered medications for this visit. Allergies   Allergen Reactions    Betadine [Povidone-Iodine] Rash    Lipitor [Atorvastatin] Other (comments)    Nitroglycerin Other (comments)     \"made his heart stop\"     Immunization History   Administered Date(s) Administered    Influenza Vaccine 10/01/2015    Influenza Vaccine (Quad) PF (>6 Mo Flulaval, Fluarix, and >3 Yrs [address] [place] [address], [place] [address]) 09/24/2018, 09/24/2019         Review of Systems   Constitutional: Positive for malaise/fatigue. Negative for chills and fever. HENT: Negative for congestion and sore throat. Respiratory: Negative for cough and shortness of breath. Cardiovascular: Negative for chest pain.    Gastrointestinal: Negative for abdominal pain, nausea and vomiting. Genitourinary: Negative for dysuria, frequency and urgency. Involuntary incontinence   Musculoskeletal: Positive for back pain. Neurological: Positive for tingling. Negative for dizziness and headaches. Psychiatric/Behavioral: Positive for depression. /79 (BP 1 Location: Left arm, BP Patient Position: Sitting)   Pulse 76   Temp 98.1 °F (36.7 °C) (Oral)   Resp 14   Ht 5' 10\" (1.778 m)   Wt 186 lb (84.4 kg)   SpO2 97%   BMI 26.69 kg/m²   Physical Exam  Vitals signs and nursing note reviewed. Constitutional:       Appearance: Normal appearance. Comments: Appears uncomfortable with position changes   HENT:      Head: Normocephalic and atraumatic. Neck:      Musculoskeletal: Normal range of motion and neck supple. Cardiovascular:      Rate and Rhythm: Normal rate and regular rhythm. Pulmonary:      Effort: Pulmonary effort is normal.      Breath sounds: Normal breath sounds. Musculoskeletal:      Lumbar back: He exhibits decreased range of motion, tenderness and pain. Back:       Comments: Positive SLR on right   Skin:     General: Skin is warm and dry. Neurological:      General: No focal deficit present. Mental Status: He is alert and oriented to person, place, and time. ASSESSMENT and PLAN  Diagnoses and all orders for this visit:    1. DDD (degenerative disc disease), lumbar  -     methylPREDNISolone (MEDROL DOSEPACK) 4 mg tablet; Take 1 Tab by mouth Specific Days and Specific Times. Take as directed  -     MRI LUMB SPINE WO CONT; Future  -     HYDROcodone-acetaminophen (NORCO) 5-325 mg per tablet; Take 1 Tab by mouth every four (4) hours as needed for Pain for up to 7 days. Max Daily Amount: 6 Tabs. 2. Depression with anxiety -- followed by Psychiatry    3. Gastrointestinal hemorrhage associated with gastritis, unspecified gastritis type -- advised to use caution when using any NSAIDs.       lab results and schedule of future lab studies reviewed with patient  reviewed diet, exercise and weight control  reviewed medications and side effects in detail  radiology results and schedule of future radiology studies reviewed with patient

## 2020-10-28 ENCOUNTER — HOSPITAL ENCOUNTER (OUTPATIENT)
Dept: MRI IMAGING | Age: 55
Discharge: HOME | End: 2020-10-28
Attending: NURSE PRACTITIONER
Payer: COMMERCIAL

## 2020-10-28 ENCOUNTER — TELEPHONE (OUTPATIENT)
Dept: INTERNAL MEDICINE CLINIC | Age: 55
End: 2020-10-28

## 2020-10-28 DIAGNOSIS — M51.36 DDD (DEGENERATIVE DISC DISEASE), LUMBAR: ICD-10-CM

## 2020-10-28 PROCEDURE — 72148 MRI LUMBAR SPINE W/O DYE: CPT

## 2020-10-28 NOTE — TELEPHONE ENCOUNTER
----- Message from Melissa Harkins sent at 10/28/2020 11:13 AM EDT -----  Regarding: Dr Radha Kay / telephone  Caller's first and last name and relationship (if not the patient): Pt  Best contact number(s): [phone]  Whose call is being returned: LORENA Maza  Details to clarify the request:  N/A

## 2020-10-28 NOTE — TELEPHONE ENCOUNTER
----- Message from [facility] sent at 10/28/2020 11:13 AM EDT -----  Regarding: Dr Juan Carlos Atkinson / telephone  Caller's first and last name and relationship (if not the patient): Pt  Best contact number(s): [phone]  Whose call is being returned: LORENA Escalona Factor  Details to clarify the request:  N/A

## 2020-10-28 NOTE — TELEPHONE ENCOUNTER
I called [facility]. To schedule pt appt with Dr. Mk Jaime. I had to leave a message with Xiomy, his  to call the office back to make this appt.

## 2020-10-28 NOTE — TELEPHONE ENCOUNTER
----- Message from Alexis Terry NP sent at 10/28/2020  9:01 AM EDT -----  I left him a message to call back for results.   Would like to get him set up to see Spine specialist either Dr Yari Elizabeth or Dr Chacha Buckner if pain not improving from time of office visit.  ----- Message -----  From: Donaldo Castro Results In  Sent: 10/28/2020   8:05 AM EDT  To: Alexis Terry NP

## 2020-12-20 RX ORDER — ALFUZOSIN HYDROCHLORIDE 10 MG/1
TABLET, EXTENDED RELEASE ORAL
Qty: 90 TAB | Refills: 1 | Status: SHIPPED | OUTPATIENT
Start: 2020-12-20 | End: 2021-08-11

## 2021-02-16 DIAGNOSIS — E78.00 PURE HYPERCHOLESTEROLEMIA: ICD-10-CM

## 2021-02-17 RX ORDER — SIMVASTATIN 20 MG/1
TABLET, FILM COATED ORAL
Qty: 90 TAB | Refills: 1 | Status: SHIPPED | OUTPATIENT
Start: 2021-02-17 | End: 2021-11-08

## 2021-02-23 DIAGNOSIS — E78.00 PURE HYPERCHOLESTEROLEMIA: Primary | ICD-10-CM

## 2021-02-23 DIAGNOSIS — D50.0 IRON DEFICIENCY ANEMIA DUE TO CHRONIC BLOOD LOSS: ICD-10-CM

## 2021-03-13 DIAGNOSIS — R79.89 TSH ELEVATION: Primary | ICD-10-CM

## 2021-03-13 LAB
ALBUMIN SERPL-MCNC: 4.7 G/DL (ref 3.8–4.9)
ALBUMIN/GLOB SERPL: 2.4 {RATIO} (ref 1.2–2.2)
ALP SERPL-CCNC: 68 IU/L (ref 39–117)
ALT SERPL-CCNC: 23 IU/L (ref 0–44)
AST SERPL-CCNC: 21 IU/L (ref 0–40)
BILIRUB SERPL-MCNC: 0.5 MG/DL (ref 0–1.2)
BUN SERPL-MCNC: 15 MG/DL (ref 6–24)
BUN/CREAT SERPL: 14 (ref 9–20)
CALCIUM SERPL-MCNC: 9.5 MG/DL (ref 8.7–10.2)
CHLORIDE SERPL-SCNC: 105 MMOL/L (ref 96–106)
CHOLEST SERPL-MCNC: 192 MG/DL (ref 100–199)
CO2 SERPL-SCNC: 23 MMOL/L (ref 20–29)
CREAT SERPL-MCNC: 1.06 MG/DL (ref 0.76–1.27)
ERYTHROCYTE [DISTWIDTH] IN BLOOD BY AUTOMATED COUNT: 12.7 % (ref 11.6–15.4)
GLOBULIN SER CALC-MCNC: 2 G/DL (ref 1.5–4.5)
GLUCOSE SERPL-MCNC: 78 MG/DL (ref 65–99)
HCT VFR BLD AUTO: 44.6 % (ref 37.5–51)
HDLC SERPL-MCNC: 47 MG/DL
HGB BLD-MCNC: 15.9 G/DL (ref 13–17.7)
IMP & REVIEW OF LAB RESULTS: NORMAL
IRON SATN MFR SERPL: 44 % (ref 15–55)
IRON SERPL-MCNC: 112 UG/DL (ref 38–169)
LDLC SERPL CALC-MCNC: 123 MG/DL (ref 0–99)
MCH RBC QN AUTO: 31.7 PG (ref 26.6–33)
MCHC RBC AUTO-ENTMCNC: 35.7 G/DL (ref 31.5–35.7)
MCV RBC AUTO: 89 FL (ref 79–97)
PLATELET # BLD AUTO: 207 X10E3/UL (ref 150–450)
POTASSIUM SERPL-SCNC: 4.4 MMOL/L (ref 3.5–5.2)
PROT SERPL-MCNC: 6.7 G/DL (ref 6–8.5)
PSA SERPL-MCNC: 3.9 NG/ML (ref 0–4)
RBC # BLD AUTO: 5.02 X10E6/UL (ref 4.14–5.8)
REFLEX CRITERIA: NORMAL
SODIUM SERPL-SCNC: 143 MMOL/L (ref 134–144)
TESTOST SERPL-MCNC: 367 NG/DL (ref 264–916)
TIBC SERPL-MCNC: 252 UG/DL (ref 250–450)
TRIGL SERPL-MCNC: 125 MG/DL (ref 0–149)
TSH SERPL DL<=0.005 MIU/L-ACNC: 5.37 UIU/ML (ref 0.45–4.5)
UIBC SERPL-MCNC: 140 UG/DL (ref 111–343)
VLDLC SERPL CALC-MCNC: 22 MG/DL (ref 5–40)
WBC # BLD AUTO: 4.6 X10E3/UL (ref 3.4–10.8)

## 2021-03-15 ENCOUNTER — TELEPHONE (OUTPATIENT)
Dept: INTERNAL MEDICINE CLINIC | Age: 56
End: 2021-03-15

## 2021-03-15 NOTE — TELEPHONE ENCOUNTER
----- Message from Ariana Looney MD sent at 3/13/2021  2:15 PM EST -----  Results reviewed. See result note for comments.   Please try to add on Free T4 lab as ordered and send to [facility]    Fax results to Dr. Giovany Alva, fax [phone]

## 2021-03-15 NOTE — TELEPHONE ENCOUNTER
----- Message from Bernice Moncada MD sent at 3/13/2021  2:15 PM EST -----  Results reviewed. See result note for comments.   Please try to add on Free T4 lab as ordered and send to [facility]    Fax results to Dr. Vanessa Mclaughlin, fax [phone]

## 2021-03-17 LAB
SPECIMEN STATUS REPORT, ROLRST: NORMAL
T4 FREE SERPL-MCNC: 0.99 NG/DL (ref 0.82–1.77)

## 2021-08-11 RX ORDER — ALFUZOSIN HYDROCHLORIDE 10 MG/1
TABLET, EXTENDED RELEASE ORAL
Qty: 90 TABLET | Refills: 1 | Status: SHIPPED | OUTPATIENT
Start: 2021-08-11 | End: 2021-11-20

## 2021-11-08 DIAGNOSIS — E78.00 PURE HYPERCHOLESTEROLEMIA: ICD-10-CM

## 2021-11-08 RX ORDER — SIMVASTATIN 20 MG/1
TABLET, FILM COATED ORAL
Qty: 90 TABLET | Refills: 1 | Status: SHIPPED | OUTPATIENT
Start: 2021-11-08 | End: 2022-05-13

## 2021-11-17 ENCOUNTER — PATIENT MESSAGE (OUTPATIENT)
Dept: INTERNAL MEDICINE CLINIC | Age: 56
End: 2021-11-17

## 2021-11-20 RX ORDER — ALFUZOSIN HYDROCHLORIDE 10 MG/1
TABLET, EXTENDED RELEASE ORAL
Qty: 90 TABLET | Refills: 1 | Status: SHIPPED | OUTPATIENT
Start: 2021-11-20 | End: 2022-08-21

## 2021-11-23 ENCOUNTER — OFFICE VISIT (OUTPATIENT)
Dept: INTERNAL MEDICINE CLINIC | Age: 56
End: 2021-11-23
Payer: COMMERCIAL

## 2021-11-23 VITALS
TEMPERATURE: 98.1 F | HEIGHT: 70 IN | SYSTOLIC BLOOD PRESSURE: 109 MMHG | DIASTOLIC BLOOD PRESSURE: 70 MMHG | BODY MASS INDEX: 26.2 KG/M2 | RESPIRATION RATE: 15 BRPM | WEIGHT: 183 LBS | OXYGEN SATURATION: 95 % | HEART RATE: 60 BPM

## 2021-11-23 DIAGNOSIS — F41.8 DEPRESSION WITH ANXIETY: ICD-10-CM

## 2021-11-23 DIAGNOSIS — Z86.2 HISTORY OF ANEMIA: ICD-10-CM

## 2021-11-23 DIAGNOSIS — N40.1 BPH WITH OBSTRUCTION/LOWER URINARY TRACT SYMPTOMS: ICD-10-CM

## 2021-11-23 DIAGNOSIS — N13.8 BPH WITH OBSTRUCTION/LOWER URINARY TRACT SYMPTOMS: ICD-10-CM

## 2021-11-23 DIAGNOSIS — E03.8 SUBCLINICAL HYPOTHYROIDISM: ICD-10-CM

## 2021-11-23 DIAGNOSIS — R42 ORTHOSTATIC DIZZINESS: Primary | ICD-10-CM

## 2021-11-23 PROCEDURE — 99213 OFFICE O/P EST LOW 20 MIN: CPT | Performed by: INTERNAL MEDICINE

## 2021-11-23 RX ORDER — QUETIAPINE 300 MG/1
300 TABLET, FILM COATED, EXTENDED RELEASE ORAL
Qty: 90 TABLET | Refills: 1
Start: 2021-11-23

## 2021-11-23 NOTE — PATIENT INSTRUCTIONS
Due to dizziness, we can consider trying finasteride to replace Alfuzosin for prostate/urinary symptoms. It takes 2-3 months to work.

## 2021-11-23 NOTE — PROGRESS NOTES
HISTORY OF PRESENT ILLNESS    Chief Complaint   Patient presents with    Dizziness     Lightheadedness - going on for quite a while - may be related to medication. Presents with concerns about orthostatic dizziness. States he has been intermittently dizzy when he is leaning over or squatting and then stands up. Sometimes he feels a little lightheaded when he stands up from a seated position. No overt symptoms. His wife noticed this happen is concerned that he could be anemic again. History of severe anemia secondary to peptic ulcer bleed March 2020. Denies any dysphagia, abdominal pain or dark or bloody stools. History of BPH. Is taking alfuzosin with some improvement in urinary outflow. He still does have to empty his bladder about 12 times per day with a mildly slow stream and some difficulty emptying the bladder. Followed by psychiatry for severe depression. Seeing Dr. Isabella Bui. He is back again on high-dose quetiapine 300 mg ER since November 4 due to recent work stresses. 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 History of subclinical hypothyroidism. Lab Results   Component Value Date/Time    TSH 5.370 (H) 03/11/2021 08:28 AM    T4, Free 0.99 03/11/2021 08:28 AM         Review of Systems   All other systems reviewed and are negative, except as noted in HPI    Past Medical and Surgical History   has a past medical history of Adjustment disorder with mixed disturbance of emotions and conduct (7/13/2018), BPH with obstruction/lower urinary tract symptoms (08/2017), Chronic low back pain without sciatica, Chronic lumbar pain, GERD (gastroesophageal reflux disease), Neck pain, chronic, PUD (peptic ulcer disease) (03/20/2020), and Pure hypercholesterolemia. has a past surgical history that includes hx vasectomy (2002); hx endoscopy (11/8/10); hx colonoscopy (10/8/07); hx colonoscopy (10/24/2019); hx endoscopy (03/20/2020); and hx lumbar laminectomy (3/5/97).      reports that he has quit smoking. He has never used smokeless tobacco. He reports current alcohol use. He reports that he does not use drugs. family history is not on file. Physical Exam   Nursing note and vitals reviewed. Blood pressure 109/70, pulse 60, temperature 98.1 °F (36.7 °C), temperature source Oral, resp. rate 15, height 5' 10\" (1.778 m), weight 183 lb (83 kg), SpO2 95 %. Constitutional:  No distress. Eyes: Conjunctivae are normal.   Ears:  Hearing grossly intact  Cardiovascular: Normal rate. regular rhythm, no murmurs or gallops  No edema  Pulmonary/Chest: Effort normal.   CTAB  Musculoskeletal: moves all 4 extremities   Neurological: Alert and oriented to person, place, and time. Skin: No visible rash noted. Psychiatric: Normal mood and affect. Behavior is normal.     Diagnoses and all orders for this visit:    1. Orthostatic dizziness  Mild. Likely secondary to mild dehydration and possibly side effects of alfuzosin. Doubt anemia based on exam and symptoms. Recommend increase hydration, increase salt intake to some degree. Monitor for now. -     CBC W/O DIFF; Future  -     METABOLIC PANEL, COMPREHENSIVE; Future    2. History of anemia  -     CBC W/O DIFF; Future    3. Subclinical hypothyroidism  Likely benign. Repeat thyroid labs. Treat based on results  -     T4, FREE; Future  -     TSH 3RD GENERATION; Future    4. Depression with anxiety  Reasonably improved, well-controlled and monitored by Dr. Vee Singleton. On higher dose quetiapine  -     QUEtiapine SR (SEROquel XR) 300 mg sr tablet; Take 1 Tablet by mouth nightly. 5. BPH with obstruction/lower urinary tract symptoms  Remains somewhat uncontrolled with alfuzosin. Will consider changing to finasteride. Consider follow-up with urology as well since I think eventually a procedure will need to be done. Voices understanding.  -     PSA W/ REFLX FREE PSA;  Future      Patient Instructions     Due to dizziness, we can consider trying finasteride to replace Alfuzosin for prostate/urinary symptoms. It takes 2-3 months to work.        lab results and schedule of future lab studies reviewed with patient  reviewed medications and side effects in detail    Return to clinic for further evaluation if new symptoms develop        Current Outpatient Medications   Medication Sig    QUEtiapine SR (SEROquel XR) 300 mg sr tablet Take 1 Tablet by mouth nightly.  alfuzosin SR (UROXATRAL) 10 mg SR tablet TAKE 1 TABLET BY MOUTH EVERY DAY    simvastatin (ZOCOR) 20 mg tablet TAKE 1 TABLET BY MOUTH EVERY DAY AT NIGHT    acetaminophen (Tylenol Arthritis Pain) 650 mg TbER Take 650 mg by mouth every eight (8) hours.  zolpidem (AMBIEN) 10 mg tablet Take 10 mg by mouth nightly. No current facility-administered medications for this visit.

## 2021-11-24 LAB
ALBUMIN SERPL-MCNC: 4.7 G/DL (ref 3.8–4.9)
ALBUMIN/GLOB SERPL: 2.1 {RATIO} (ref 1.2–2.2)
ALP SERPL-CCNC: 71 IU/L (ref 44–121)
ALT SERPL-CCNC: 32 IU/L (ref 0–44)
AST SERPL-CCNC: 22 IU/L (ref 0–40)
BILIRUB SERPL-MCNC: 0.5 MG/DL (ref 0–1.2)
BUN SERPL-MCNC: 12 MG/DL (ref 6–24)
BUN/CREAT SERPL: 12 (ref 9–20)
CALCIUM SERPL-MCNC: 9.4 MG/DL (ref 8.7–10.2)
CHLORIDE SERPL-SCNC: 105 MMOL/L (ref 96–106)
CO2 SERPL-SCNC: 24 MMOL/L (ref 20–29)
CREAT SERPL-MCNC: 1.02 MG/DL (ref 0.76–1.27)
ERYTHROCYTE [DISTWIDTH] IN BLOOD BY AUTOMATED COUNT: 12.7 % (ref 11.6–15.4)
GLOBULIN SER CALC-MCNC: 2.2 G/DL (ref 1.5–4.5)
GLUCOSE SERPL-MCNC: 87 MG/DL (ref 65–99)
HCT VFR BLD AUTO: 42 % (ref 37.5–51)
HGB BLD-MCNC: 15 G/DL (ref 13–17.7)
MCH RBC QN AUTO: 31.9 PG (ref 26.6–33)
MCHC RBC AUTO-ENTMCNC: 35.7 G/DL (ref 31.5–35.7)
MCV RBC AUTO: 89 FL (ref 79–97)
PLATELET # BLD AUTO: 223 X10E3/UL (ref 150–450)
POTASSIUM SERPL-SCNC: 4.2 MMOL/L (ref 3.5–5.2)
PROT SERPL-MCNC: 6.9 G/DL (ref 6–8.5)
PSA SERPL-MCNC: 4.6 NG/ML (ref 0–4)
RBC # BLD AUTO: 4.7 X10E6/UL (ref 4.14–5.8)
SODIUM SERPL-SCNC: 142 MMOL/L (ref 134–144)
T4 FREE SERPL-MCNC: 0.89 NG/DL (ref 0.82–1.77)
TSH SERPL DL<=0.005 MIU/L-ACNC: 3.23 UIU/ML (ref 0.45–4.5)
WBC # BLD AUTO: 4.3 X10E3/UL (ref 3.4–10.8)

## 2022-03-18 PROBLEM — N40.1 BPH WITH OBSTRUCTION/LOWER URINARY TRACT SYMPTOMS: Status: ACTIVE | Noted: 2018-02-23

## 2022-03-18 PROBLEM — N13.8 BPH WITH OBSTRUCTION/LOWER URINARY TRACT SYMPTOMS: Status: ACTIVE | Noted: 2018-02-23

## 2022-03-19 PROBLEM — F43.25 ADJUSTMENT DISORDER WITH MIXED DISTURBANCE OF EMOTIONS AND CONDUCT: Status: ACTIVE | Noted: 2018-07-13

## 2022-03-19 PROBLEM — K92.2 GI BLEED: Status: ACTIVE | Noted: 2020-03-19

## 2022-03-20 PROBLEM — F41.8 DEPRESSION WITH ANXIETY: Status: ACTIVE | Noted: 2018-07-13

## 2022-05-13 DIAGNOSIS — E78.00 PURE HYPERCHOLESTEROLEMIA: ICD-10-CM

## 2022-05-13 RX ORDER — SIMVASTATIN 20 MG/1
TABLET, FILM COATED ORAL
Qty: 90 TABLET | Refills: 1 | Status: SHIPPED | OUTPATIENT
Start: 2022-05-13

## 2022-08-21 RX ORDER — ALFUZOSIN HYDROCHLORIDE 10 MG/1
TABLET, EXTENDED RELEASE ORAL
Qty: 90 TABLET | Refills: 1 | Status: SHIPPED | OUTPATIENT
Start: 2022-08-21

## 2022-11-04 LAB
ALBUMIN SERPL-MCNC: 4.9 G/DL (ref 3.8–4.9)
ALBUMIN/GLOB SERPL: 2.2 {RATIO} (ref 1.2–2.2)
ALP SERPL-CCNC: 73 IU/L (ref 44–121)
ALT SERPL-CCNC: 31 IU/L (ref 0–44)
AST SERPL-CCNC: 24 IU/L (ref 0–40)
BILIRUB SERPL-MCNC: 0.5 MG/DL (ref 0–1.2)
BUN SERPL-MCNC: 16 MG/DL (ref 6–24)
BUN/CREAT SERPL: 15 (ref 9–20)
CALCIUM SERPL-MCNC: 9.7 MG/DL (ref 8.7–10.2)
CHLORIDE SERPL-SCNC: 103 MMOL/L (ref 96–106)
CO2 SERPL-SCNC: 22 MMOL/L (ref 20–29)
CREAT SERPL-MCNC: 1.05 MG/DL (ref 0.76–1.27)
EGFR: 82 ML/MIN/1.73
ERYTHROCYTE [DISTWIDTH] IN BLOOD BY AUTOMATED COUNT: 12.3 % (ref 11.6–15.4)
GLOBULIN SER CALC-MCNC: 2.2 G/DL (ref 1.5–4.5)
GLUCOSE SERPL-MCNC: 94 MG/DL (ref 70–99)
HCT VFR BLD AUTO: 46.4 % (ref 37.5–51)
HGB BLD-MCNC: 15.9 G/DL (ref 13–17.7)
MCH RBC QN AUTO: 31.2 PG (ref 26.6–33)
MCHC RBC AUTO-ENTMCNC: 34.3 G/DL (ref 31.5–35.7)
MCV RBC AUTO: 91 FL (ref 79–97)
PLATELET # BLD AUTO: 213 X10E3/UL (ref 150–450)
POTASSIUM SERPL-SCNC: 4.2 MMOL/L (ref 3.5–5.2)
PROT SERPL-MCNC: 7.1 G/DL (ref 6–8.5)
PSA FREE MFR SERPL: 16.4 %
PSA FREE SERPL-MCNC: 0.92 NG/ML
PSA SERPL-MCNC: 5.6 NG/ML (ref 0–4)
RBC # BLD AUTO: 5.09 X10E6/UL (ref 4.14–5.8)
REFLEX CRITERIA: ABNORMAL
SODIUM SERPL-SCNC: 141 MMOL/L (ref 134–144)
T4 FREE SERPL-MCNC: 1.13 NG/DL (ref 0.82–1.77)
TSH SERPL DL<=0.005 MIU/L-ACNC: 3.8 UIU/ML (ref 0.45–4.5)
WBC # BLD AUTO: 3.7 X10E3/UL (ref 3.4–10.8)

## 2022-11-05 DIAGNOSIS — E78.00 PURE HYPERCHOLESTEROLEMIA: ICD-10-CM

## 2022-11-05 RX ORDER — SIMVASTATIN 20 MG/1
TABLET, FILM COATED ORAL
Qty: 90 TABLET | Refills: 1 | Status: SHIPPED | OUTPATIENT
Start: 2022-11-05

## 2023-02-17 RX ORDER — ALFUZOSIN HYDROCHLORIDE 10 MG/1
TABLET, EXTENDED RELEASE ORAL
Qty: 90 TABLET | Refills: 1 | Status: SHIPPED | OUTPATIENT
Start: 2023-02-17

## 2023-03-07 ENCOUNTER — OFFICE VISIT (OUTPATIENT)
Dept: INTERNAL MEDICINE CLINIC | Age: 58
End: 2023-03-07
Payer: COMMERCIAL

## 2023-03-07 VITALS
SYSTOLIC BLOOD PRESSURE: 113 MMHG | DIASTOLIC BLOOD PRESSURE: 78 MMHG | HEART RATE: 76 BPM | HEIGHT: 70 IN | BODY MASS INDEX: 25.57 KG/M2 | RESPIRATION RATE: 14 BRPM | WEIGHT: 178.6 LBS | OXYGEN SATURATION: 97 % | TEMPERATURE: 98 F

## 2023-03-07 DIAGNOSIS — R97.20 ELEVATED PSA: ICD-10-CM

## 2023-03-07 DIAGNOSIS — R35.0 FREQUENT URINATION: Primary | ICD-10-CM

## 2023-03-07 DIAGNOSIS — R31.9 HEMATURIA, UNSPECIFIED TYPE: ICD-10-CM

## 2023-03-07 LAB
BILIRUB UR QL STRIP: NEGATIVE
GLUCOSE UR-MCNC: NEGATIVE MG/DL
KETONES P FAST UR STRIP-MCNC: NORMAL MG/DL
PH UR STRIP: 6.5 [PH] (ref 4.6–8)
PROT UR QL STRIP: NEGATIVE
SP GR UR STRIP: 1.02 (ref 1–1.03)
UA UROBILINOGEN AMB POC: NORMAL (ref 0.2–1)
URINALYSIS CLARITY POC: NORMAL
URINALYSIS COLOR POC: YELLOW
URINE BLOOD POC: NEGATIVE
URINE LEUKOCYTES POC: NEGATIVE
URINE NITRITES POC: NEGATIVE

## 2023-03-07 PROCEDURE — 99214 OFFICE O/P EST MOD 30 MIN: CPT | Performed by: INTERNAL MEDICINE

## 2023-03-07 PROCEDURE — 81002 URINALYSIS NONAUTO W/O SCOPE: CPT | Performed by: INTERNAL MEDICINE

## 2023-03-07 NOTE — PROGRESS NOTES
Note   Chief Complaint   Blood in urine    Tamie Stuart is a 59 y.o. male     1. Frequent urination  -     AMB POC URINALYSIS DIP STICK MANUAL W/O MICRO  2. Hematuria, unspecified type  -     REFERRAL TO UROLOGY  -     CULTURE, URINE; Future  -     URINALYSIS W/MICROSCOPIC; Future  -     CBC WITH AUTOMATED DIFF; Future  -     METABOLIC PANEL, BASIC; Future  3. Elevated PSA  -     PSA W/ REFLX FREE PSA; Future     Pain after urinating Friday, afterwards noted bright red blood  Bleeding into underwear throughout the day  Noting blood at urethra  No blood in urine while urinating, notices it after   No history of smoking  No other abnormal urethral discharge   Not sexually active   Maybe left sided abd pain, but not significant  Resolved after saturday  Unclear etiology. Check cbc, bmp. Psa elevated in nov, recheck (hasn't seen urology for psa). Check ucx and formal UA for any infection, casts. Urology referral provided- advised pt he needs to see them to rule out any masses or issues in his bladder causing the symptoms          Benefits, risks, possible drug interactions, and side effects of all new medications were reviewed with the patient. Pt verbalized understanding. Return to clinic:  2 months with PCP    An electronic signature was used to authenticate this note. Jose Luis Yanez MD  [facility]  3/7/2023    No future appointments. Objective   Vitals:       Visit Vitals  /78 (BP 1 Location: Left upper arm, BP Patient Position: Sitting, BP Cuff Size: Small adult)   Pulse 76   Temp 98 °F (36.7 °C) (Oral)   Resp 14   Ht 5' 10\" (1.778 m)   Wt 178 lb 9.6 oz (81 kg)   SpO2 97%   BMI 25.63 kg/m²        Physical Exam  Constitutional:       Appearance: Normal appearance. He is not ill-appearing. Pulmonary:      Effort: No respiratory distress.    Genitourinary:     Comments: No urethral or penile abnormalities noted on exam  Neurological:      Mental Status: He is alert. Current Outpatient Medications   Medication Sig    alfuzosin SR (UROXATRAL) 10 mg SR tablet TAKE 1 TABLET BY MOUTH EVERY DAY    simvastatin (ZOCOR) 20 mg tablet TAKE 1 TABLET BY MOUTH EVERY DAY AT NIGHT    QUEtiapine SR (SEROquel XR) 300 mg sr tablet Take 1 Tablet by mouth nightly. acetaminophen (TYLENOL) 650 mg TbER Take 650 mg by mouth every eight (8) hours. zolpidem (AMBIEN) 10 mg tablet Take 10 mg by mouth nightly. No current facility-administered medications for this visit.

## 2023-03-08 LAB
ANION GAP SERPL CALC-SCNC: 1 MMOL/L (ref 5–15)
APPEARANCE UR: CLEAR
BACTERIA URNS QL MICRO: NEGATIVE /HPF
BASOPHILS # BLD: 0 K/UL (ref 0–0.1)
BASOPHILS NFR BLD: 1 % (ref 0–1)
BILIRUB UR QL: NEGATIVE
BUN SERPL-MCNC: 18 MG/DL (ref 6–20)
BUN/CREAT SERPL: 16 (ref 12–20)
CALCIUM SERPL-MCNC: 9.2 MG/DL (ref 8.5–10.1)
CHLORIDE SERPL-SCNC: 109 MMOL/L (ref 97–108)
CO2 SERPL-SCNC: 31 MMOL/L (ref 21–32)
COLOR UR: NORMAL
CREAT SERPL-MCNC: 1.1 MG/DL (ref 0.7–1.3)
DIFFERENTIAL METHOD BLD: NORMAL
EOSINOPHIL # BLD: 0.2 K/UL (ref 0–0.4)
EOSINOPHIL NFR BLD: 4 % (ref 0–7)
EPITH CASTS URNS QL MICRO: NORMAL /LPF
ERYTHROCYTE [DISTWIDTH] IN BLOOD BY AUTOMATED COUNT: 11.6 % (ref 11.5–14.5)
GLUCOSE SERPL-MCNC: 90 MG/DL (ref 65–100)
GLUCOSE UR STRIP.AUTO-MCNC: NEGATIVE MG/DL
HCT VFR BLD AUTO: 45.2 % (ref 36.6–50.3)
HGB BLD-MCNC: 15.1 G/DL (ref 12.1–17)
HGB UR QL STRIP: NEGATIVE
HYALINE CASTS URNS QL MICRO: NORMAL /LPF (ref 0–5)
IMM GRANULOCYTES # BLD AUTO: 0 K/UL (ref 0–0.04)
IMM GRANULOCYTES NFR BLD AUTO: 0 % (ref 0–0.5)
KETONES UR QL STRIP.AUTO: NEGATIVE MG/DL
LEUKOCYTE ESTERASE UR QL STRIP.AUTO: NEGATIVE
LYMPHOCYTES # BLD: 1.8 K/UL (ref 0.8–3.5)
LYMPHOCYTES NFR BLD: 36 % (ref 12–49)
MCH RBC QN AUTO: 30.8 PG (ref 26–34)
MCHC RBC AUTO-ENTMCNC: 33.4 G/DL (ref 30–36.5)
MCV RBC AUTO: 92.1 FL (ref 80–99)
MONOCYTES # BLD: 0.6 K/UL (ref 0–1)
MONOCYTES NFR BLD: 12 % (ref 5–13)
NEUTS SEG # BLD: 2.3 K/UL (ref 1.8–8)
NEUTS SEG NFR BLD: 47 % (ref 32–75)
NITRITE UR QL STRIP.AUTO: NEGATIVE
NRBC # BLD: 0 K/UL (ref 0–0.01)
NRBC BLD-RTO: 0 PER 100 WBC
PH UR STRIP: 6.5 (ref 5–8)
PLATELET # BLD AUTO: 266 K/UL (ref 150–400)
PMV BLD AUTO: 9.5 FL (ref 8.9–12.9)
POTASSIUM SERPL-SCNC: 4.7 MMOL/L (ref 3.5–5.1)
PROT UR STRIP-MCNC: NEGATIVE MG/DL
RBC # BLD AUTO: 4.91 M/UL (ref 4.1–5.7)
RBC #/AREA URNS HPF: NORMAL /HPF (ref 0–5)
SODIUM SERPL-SCNC: 141 MMOL/L (ref 136–145)
SP GR UR REFRACTOMETRY: 1.02 (ref 1–1.03)
UROBILINOGEN UR QL STRIP.AUTO: 1 EU/DL (ref 0.2–1)
WBC # BLD AUTO: 4.9 K/UL (ref 4.1–11.1)
WBC URNS QL MICRO: NORMAL /HPF (ref 0–4)

## 2023-03-09 LAB
% FREE PSA, 480797: 15.1 %
BACTERIA SPEC CULT: NORMAL
PSA SERPL-MCNC: 5.1 NG/ML (ref 0–4)
PSA, FREE, 480853: 0.77 NG/ML
REFLEX CRITERIA: ABNORMAL
SERVICE CMNT-IMP: NORMAL

## 2023-03-09 NOTE — PROGRESS NOTES
Please call the pt - labs normal except for elevated PSA level. I recommend seeing urology like we discussed during his visit.    ==  UA and UCX negative. PSA 5.1.   BMP normal.  CBC normal.

## 2023-03-10 ENCOUNTER — TELEPHONE (OUTPATIENT)
Dept: INTERNAL MEDICINE CLINIC | Age: 58
End: 2023-03-10

## 2023-03-10 NOTE — TELEPHONE ENCOUNTER
Nurse have attempted without success to contact this patient by phone nurse  left a voice mail for a call back to discuss recommendations from provider. [employer] message sent to patient.

## 2023-03-10 NOTE — TELEPHONE ENCOUNTER
----- Message from Quita Boudreaux MD sent at 6/9/9563  2:56 PM EST -----  Please call the pt - labs normal except for elevated PSA level. I recommend seeing urology like we discussed during his visit.    ==  UA and UCX negative. PSA 5.1.   BMP normal.  CBC normal.

## 2023-03-13 ENCOUNTER — TRANSCRIBE ORDER (OUTPATIENT)
Dept: SCHEDULING | Age: 58
End: 2023-03-13

## 2023-03-13 DIAGNOSIS — R97.20 ELEVATED PSA: Primary | ICD-10-CM

## 2023-03-26 ENCOUNTER — HOSPITAL ENCOUNTER (OUTPATIENT)
Dept: MRI IMAGING | Age: 58
Discharge: HOME | End: 2023-03-26
Attending: UROLOGY
Payer: COMMERCIAL

## 2023-03-26 DIAGNOSIS — R97.20 ELEVATED PSA: ICD-10-CM

## 2023-03-26 PROCEDURE — 74011000258 HC RX REV CODE- 258: Performed by: UROLOGY

## 2023-03-26 PROCEDURE — 74011000250 HC RX REV CODE- 250: Performed by: UROLOGY

## 2023-03-26 PROCEDURE — 74011250636 HC RX REV CODE- 250/636

## 2023-03-26 PROCEDURE — A9576 INJ PROHANCE MULTIPACK: HCPCS

## 2023-03-26 PROCEDURE — 72197 MRI PELVIS W/O & W/DYE: CPT

## 2023-03-26 RX ORDER — SODIUM CHLORIDE 0.9 % (FLUSH) 0.9 %
10 SYRINGE (ML) INJECTION
Status: COMPLETED | OUTPATIENT
Start: 2023-03-26 | End: 2023-03-26

## 2023-03-26 RX ADMIN — SODIUM CHLORIDE, PRESERVATIVE FREE 10 ML: 5 INJECTION INTRAVENOUS at 09:00

## 2023-03-26 RX ADMIN — SODIUM CHLORIDE 100 ML: 900 INJECTION, SOLUTION INTRAVENOUS at 09:00

## 2023-03-26 RX ADMIN — GADOTERIDOL 18 ML: 279.3 INJECTION, SOLUTION INTRAVENOUS at 09:00

## 2023-04-30 DIAGNOSIS — E78.00 PURE HYPERCHOLESTEROLEMIA: ICD-10-CM

## 2023-05-01 RX ORDER — SIMVASTATIN 20 MG/1
TABLET, FILM COATED ORAL
Qty: 90 TABLET | Refills: 1 | Status: SHIPPED | OUTPATIENT
Start: 2023-05-01

## 2023-05-08 ENCOUNTER — OFFICE VISIT (OUTPATIENT)
Age: 58
End: 2023-05-08
Payer: COMMERCIAL

## 2023-05-08 VITALS
RESPIRATION RATE: 17 BRPM | TEMPERATURE: 98.2 F | BODY MASS INDEX: 24.77 KG/M2 | DIASTOLIC BLOOD PRESSURE: 77 MMHG | WEIGHT: 173 LBS | HEART RATE: 75 BPM | OXYGEN SATURATION: 97 % | HEIGHT: 70 IN | SYSTOLIC BLOOD PRESSURE: 113 MMHG

## 2023-05-08 DIAGNOSIS — F31.60 BIPOLAR 1 DISORDER, MIXED (HCC): ICD-10-CM

## 2023-05-08 DIAGNOSIS — N40.2 PROSTATE NODULE: ICD-10-CM

## 2023-05-08 DIAGNOSIS — E78.00 PURE HYPERCHOLESTEROLEMIA: ICD-10-CM

## 2023-05-08 DIAGNOSIS — R97.20 ELEVATED PROSTATE SPECIFIC ANTIGEN (PSA): Primary | ICD-10-CM

## 2023-05-08 PROCEDURE — 99213 OFFICE O/P EST LOW 20 MIN: CPT | Performed by: INTERNAL MEDICINE

## 2023-05-08 RX ORDER — QUETIAPINE FUMARATE 100 MG/1
TABLET, FILM COATED ORAL
COMMUNITY
Start: 2023-02-28 | End: 2023-05-08

## 2023-05-08 RX ORDER — SIMVASTATIN 20 MG
20 TABLET ORAL NIGHTLY
COMMUNITY
Start: 2023-05-01

## 2023-05-08 RX ORDER — ALFUZOSIN HYDROCHLORIDE 10 MG/1
10 TABLET, EXTENDED RELEASE ORAL DAILY
COMMUNITY
Start: 2023-02-20

## 2023-05-08 RX ORDER — QUETIAPINE FUMARATE 100 MG/1
100 TABLET, FILM COATED ORAL DAILY
Qty: 90 TABLET | Refills: 1
Start: 2023-05-08

## 2023-05-08 RX ORDER — ZOLPIDEM TARTRATE 12.5 MG/1
12.5 TABLET, FILM COATED, EXTENDED RELEASE ORAL NIGHTLY PRN
COMMUNITY
Start: 2023-04-10

## 2023-05-08 SDOH — ECONOMIC STABILITY: TRANSPORTATION INSECURITY
IN THE PAST 12 MONTHS, HAS LACK OF TRANSPORTATION KEPT YOU FROM MEETINGS, WORK, OR FROM GETTING THINGS NEEDED FOR DAILY LIVING?: NO

## 2023-05-08 SDOH — ECONOMIC STABILITY: INCOME INSECURITY: HOW HARD IS IT FOR YOU TO PAY FOR THE VERY BASICS LIKE FOOD, HOUSING, MEDICAL CARE, AND HEATING?: NOT HARD AT ALL

## 2023-05-08 SDOH — ECONOMIC STABILITY: FOOD INSECURITY: WITHIN THE PAST 12 MONTHS, YOU WORRIED THAT YOUR FOOD WOULD RUN OUT BEFORE YOU GOT MONEY TO BUY MORE.: NEVER TRUE

## 2023-05-08 SDOH — ECONOMIC STABILITY: FOOD INSECURITY: WITHIN THE PAST 12 MONTHS, THE FOOD YOU BOUGHT JUST DIDN'T LAST AND YOU DIDN'T HAVE MONEY TO GET MORE.: NEVER TRUE

## 2023-05-08 SDOH — ECONOMIC STABILITY: HOUSING INSECURITY
IN THE LAST 12 MONTHS, WAS THERE A TIME WHEN YOU DID NOT HAVE A STEADY PLACE TO SLEEP OR SLEPT IN A SHELTER (INCLUDING NOW)?: NO

## 2023-05-08 SDOH — ECONOMIC STABILITY: TRANSPORTATION INSECURITY
IN THE PAST 12 MONTHS, HAS THE LACK OF TRANSPORTATION KEPT YOU FROM MEDICAL APPOINTMENTS OR FROM GETTING MEDICATIONS?: NO

## 2023-05-08 SDOH — ECONOMIC STABILITY: HOUSING INSECURITY: IN THE LAST 12 MONTHS, HOW MANY PLACES HAVE YOU LIVED?: 1

## 2023-05-08 SDOH — ECONOMIC STABILITY: INCOME INSECURITY: IN THE LAST 12 MONTHS, WAS THERE A TIME WHEN YOU WERE NOT ABLE TO PAY THE MORTGAGE OR RENT ON TIME?: NO

## 2023-05-08 SDOH — ECONOMIC STABILITY: HOUSING INSECURITY
IN THE LAST 12 MONTHS, WAS THERE A TIME WHEN YOU DID NOT HAVE A STEADY PLACE TO SLEEP OR SLEPT IN A SHELTER (INCLUDING NOW)?: YES

## 2023-05-08 SDOH — HEALTH STABILITY: MENTAL HEALTH
STRESS IS WHEN SOMEONE FEELS TENSE, NERVOUS, ANXIOUS, OR CAN'T SLEEP AT NIGHT BECAUSE THEIR MIND IS TROUBLED. HOW STRESSED ARE YOU?: NOT AT ALL

## 2023-05-08 ASSESSMENT — ANXIETY QUESTIONNAIRES
2. NOT BEING ABLE TO STOP OR CONTROL WORRYING: 0
6. BECOMING EASILY ANNOYED OR IRRITABLE: 0
4. TROUBLE RELAXING: 0
7. FEELING AFRAID AS IF SOMETHING AWFUL MIGHT HAPPEN: 0
5. BEING SO RESTLESS THAT IT IS HARD TO SIT STILL: 0
1. FEELING NERVOUS, ANXIOUS, OR ON EDGE: 0
3. WORRYING TOO MUCH ABOUT DIFFERENT THINGS: 0
GAD7 TOTAL SCORE: 0

## 2023-05-08 ASSESSMENT — PATIENT HEALTH QUESTIONNAIRE - PHQ9
SUM OF ALL RESPONSES TO PHQ QUESTIONS 1-9: 0
2. FEELING DOWN, DEPRESSED OR HOPELESS: 0
SUM OF ALL RESPONSES TO PHQ9 QUESTIONS 1 & 2: 0
SUM OF ALL RESPONSES TO PHQ QUESTIONS 1-9: 0
SUM OF ALL RESPONSES TO PHQ QUESTIONS 1-9: 0
1. LITTLE INTEREST OR PLEASURE IN DOING THINGS: 0
SUM OF ALL RESPONSES TO PHQ QUESTIONS 1-9: 0

## 2023-05-08 NOTE — PROGRESS NOTES
HISTORY OF PRESENT ILLNESS    Chief Complaint   Patient presents with    Follow-up     [facility]. Presents for follow-up    Last seen by me 11/2021. Was seen by colleague here March 7. 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 .    Hx hematuria, elevated PSA  Lab Results   Component Value Date    PSA 5.1 (H) 03/07/2023   Saw [facility] Dr. Galdino Salazar. Cystoscopy done. Bleeding from complication. MRI prostate 3/27/23 enlargement and nodule. 1.4 cm suspicious nodule in the left transition zone near the apex. Prostatomegaly. Biopsy done, results pending. History of BPH. Took alfuzosin with some improvement in urinary outflow. Still has some issues with difficulty of stream and initiation of urination  One     Bipolar. Sees Dr. Frankie Fink via telemedicine. Barbara Banda He is weaning down quetiapine to 100 mg now. Was on  mg. Patient wants to wean off, but psych does not. Pt feels his overall status is stable and he feels he is in a better place now, with less work stress. He is concerned that urologic issue will increased anxiety. Also still has stress re: work which has not changed from 5 years ago. Taking zolpidem ER 12.5 mg daily for sleep. Took lorazepam 0.5 mg prn    Hyperlipidemia  Currently he takes simvastatin 20 mg  ROS: taking medications as instructed, no medication side effects noted  No new myalgias, no joint pains, no weakness  No TIA's, no chest pain on exertion, no dyspnea on exertion, no swelling of ankles.    Lab Results   Component Value Date/Time    CHOL 192 03/11/2021 08:28 AM    HDL 47 03/11/2021 08:28 AM    VLDL 22 03/11/2021 08:28 AM         Review of Systems   All other systems reviewed and are negative, except as noted in HPI    Past Medical and Surgical History   has a past medical history of Adjustment disorder with mixed disturbance of emotions and conduct, BPH with obstruction/lower urinary tract symptoms, Chronic low back pain without

## 2023-05-22 RX ORDER — QUETIAPINE 300 MG/1
1 TABLET, FILM COATED, EXTENDED RELEASE ORAL NIGHTLY
COMMUNITY
Start: 2021-11-23 | End: 2023-05-31 | Stop reason: ALTCHOICE

## 2023-05-22 RX ORDER — ZOLPIDEM TARTRATE 10 MG/1
10 TABLET ORAL NIGHTLY
COMMUNITY
End: 2023-05-31 | Stop reason: ALTCHOICE

## 2023-05-22 RX ORDER — SENNOSIDES 8.6 MG
650 CAPSULE ORAL EVERY 8 HOURS
COMMUNITY

## 2023-05-22 RX ORDER — SIMVASTATIN 20 MG
1 TABLET ORAL NIGHTLY
COMMUNITY
Start: 2023-05-01

## 2023-05-22 RX ORDER — ALFUZOSIN HYDROCHLORIDE 10 MG/1
1 TABLET, EXTENDED RELEASE ORAL DAILY
COMMUNITY
Start: 2023-02-17

## 2023-05-25 ENCOUNTER — TELEPHONE (OUTPATIENT)
Age: 58
End: 2023-05-25

## 2023-05-25 NOTE — TELEPHONE ENCOUNTER
Spoke with patient and updated on Jose Shaikh comments and recommendation to go to Urgent Care for evaluation and treatment. HE states understanding and grateful for the call.

## 2023-05-25 NOTE — TELEPHONE ENCOUNTER
Agree with appointment. Ideally would be seen sooner. If availability with any provider but I understand that may not be likely right now. Symptoms may be manifestations of anxiety and weaning.   Please consider sending him to urgent care

## 2023-05-25 NOTE — TELEPHONE ENCOUNTER
Spoke with patient and he reports had an appt earlier in May and he has a follow up on 7/10/23 -Current symptoms- HX of Dizziness and lightheaded--Heart racing-Weight Loss. He reports he is feeling like this again last 4-5 days  intermittently and worsening. He reports has not fainted but felt close to fainting. He is on the last week of weaning down off Seroquel. He denies any increase in anxiety. Advised no available appts today and Scheduled for 5/31/23 at 3:40 PM. Advised if symptoms should worsen or fail to improve to go to ER for evaluation and treatment. Dr. She Drew notified. Z Plasty Text: The lesion was extirpated to the level of the fat with a #15 scalpel blade.  Given the location of the defect, shape of the defect and the proximity to free margins a Z-plasty was deemed most appropriate for repair.  Using a sterile surgical marker, the appropriate transposition arms of the Z-plasty were drawn incorporating the defect and placing the expected incisions within the relaxed skin tension lines where possible.    The area thus outlined was incised deep to adipose tissue with a #15 scalpel blade.  The skin margins were undermined to an appropriate distance in all directions utilizing iris scissors.  The opposing transposition arms were then transposed into place in opposite direction and anchored with interrupted buried subcutaneous sutures.

## 2023-05-31 ENCOUNTER — OFFICE VISIT (OUTPATIENT)
Age: 58
End: 2023-05-31
Payer: COMMERCIAL

## 2023-05-31 VITALS
HEIGHT: 70 IN | DIASTOLIC BLOOD PRESSURE: 65 MMHG | OXYGEN SATURATION: 98 % | BODY MASS INDEX: 24.57 KG/M2 | WEIGHT: 171.6 LBS | HEART RATE: 74 BPM | TEMPERATURE: 98.1 F | RESPIRATION RATE: 17 BRPM | SYSTOLIC BLOOD PRESSURE: 98 MMHG

## 2023-05-31 DIAGNOSIS — R00.2 PALPITATIONS: ICD-10-CM

## 2023-05-31 DIAGNOSIS — F31.60 BIPOLAR 1 DISORDER, MIXED (HCC): ICD-10-CM

## 2023-05-31 DIAGNOSIS — R10.13 EPIGASTRIC PAIN: ICD-10-CM

## 2023-05-31 DIAGNOSIS — R42 DIZZINESS: ICD-10-CM

## 2023-05-31 DIAGNOSIS — R63.0 DECREASED APPETITE: ICD-10-CM

## 2023-05-31 DIAGNOSIS — Z87.11 HISTORY OF BLEEDING PEPTIC ULCER: ICD-10-CM

## 2023-05-31 DIAGNOSIS — R10.13 EPIGASTRIC PAIN: Primary | ICD-10-CM

## 2023-05-31 DIAGNOSIS — J40 BRONCHITIS: ICD-10-CM

## 2023-05-31 PROCEDURE — 99214 OFFICE O/P EST MOD 30 MIN: CPT | Performed by: INTERNAL MEDICINE

## 2023-05-31 RX ORDER — PANTOPRAZOLE SODIUM 40 MG/1
40 TABLET, DELAYED RELEASE ORAL
Qty: 30 TABLET | Refills: 0 | Status: SHIPPED | OUTPATIENT
Start: 2023-05-31

## 2023-05-31 RX ORDER — AZITHROMYCIN 250 MG/1
250 TABLET, FILM COATED ORAL SEE ADMIN INSTRUCTIONS
Qty: 6 TABLET | Refills: 0 | Status: SHIPPED | OUTPATIENT
Start: 2023-05-31 | End: 2023-06-05

## 2023-05-31 RX ORDER — SUCRALFATE 1 G/1
1 TABLET ORAL 4 TIMES DAILY
Qty: 40 TABLET | Refills: 0 | Status: SHIPPED | OUTPATIENT
Start: 2023-05-31

## 2023-05-31 NOTE — PROGRESS NOTES
HISTORY OF PRESENT ILLNESS    Chief Complaint   Patient presents with    Headache     4-5 days - lightheadedness and palpitations    Dizziness    Weight Loss       Presents for follow-up    Prostate biopsy neg for cancer! Depression/anxiety  He queaned off and stopped quetiapine 4 days ago, under supervision of Dr. Huber Pa after 4 month process. 2 weeks of URI, fever. Covid neg x2. Still reports cough, chest congestion. No sinus pressure, ear pressure. Wife and son with same s/s. Reports episodes of palpitations, presyncope, blurry visions. Occurred when walking at times. He called 5/25 and was advised to go to ER. His son was coming to visit and he did not want to go. S/s improved and now has only mild s/s at times. No headaches. Sometimes is dizzy when he stand up. Abdominal pains. Epigastric, sometimes right left upper quad. Black stool x2 yesterday and today. Maybe has dark stool last week, then went 5 days without BM. Hx of anemia from PUD 3/2020, required transmissions. Told his lips look white per his wife. No nsaid use, hematuria  Reports constipatiion, no diarrhea. No pepto or tums  Reports loss of appetite. Wt Readings from Last 3 Encounters:   05/31/23 171 lb 9.6 oz (77.8 kg)   05/08/23 173 lb (78.5 kg)   03/07/23 178 lb 9.6 oz (81 kg)          Review of Systems   All other systems reviewed and are negative, except as noted in HPI    Past Medical and Surgical History   has a past medical history of Adjustment disorder with mixed disturbance of emotions and conduct, Bipolar 1 disorder, mixed ([address] BPH with obstruction/lower urinary tract symptoms, Chronic low back pain without sciatica, Chronic lumbar pain, GERD (gastroesophageal reflux disease), Neck pain, chronic, PUD (peptic ulcer disease), and Pure hypercholesterolemia. has a past surgical history that includes lumbar laminectomy (3/5/97); Vasectomy (2002); Upper gastrointestinal endoscopy (11/8/10);  Colonoscopy

## 2023-06-01 LAB
ALBUMIN SERPL-MCNC: 3.8 G/DL (ref 3.5–5)
ALBUMIN/GLOB SERPL: 1.7 (ref 1.1–2.2)
ALP SERPL-CCNC: 65 U/L (ref 45–117)
ALT SERPL-CCNC: 132 U/L (ref 12–78)
ANION GAP SERPL CALC-SCNC: 4 MMOL/L (ref 5–15)
AST SERPL-CCNC: 64 U/L (ref 15–37)
BILIRUB SERPL-MCNC: 0.5 MG/DL (ref 0.2–1)
BUN SERPL-MCNC: 11 MG/DL (ref 6–20)
BUN/CREAT SERPL: 12 (ref 12–20)
CALCIUM SERPL-MCNC: 8.4 MG/DL (ref 8.5–10.1)
CHLORIDE SERPL-SCNC: 111 MMOL/L (ref 97–108)
CO2 SERPL-SCNC: 26 MMOL/L (ref 21–32)
CREAT SERPL-MCNC: 0.89 MG/DL (ref 0.7–1.3)
ERYTHROCYTE [DISTWIDTH] IN BLOOD BY AUTOMATED COUNT: 15.3 % (ref 11.5–14.5)
GLOBULIN SER CALC-MCNC: 2.2 G/DL (ref 2–4)
GLUCOSE SERPL-MCNC: 93 MG/DL (ref 65–100)
HCT VFR BLD AUTO: 25 % (ref 36.6–50.3)
HGB BLD-MCNC: 8.2 G/DL (ref 12.1–17)
MCH RBC QN AUTO: 32.3 PG (ref 26–34)
MCHC RBC AUTO-ENTMCNC: 32.8 G/DL (ref 30–36.5)
MCV RBC AUTO: 98.4 FL (ref 80–99)
NRBC # BLD: 0 K/UL (ref 0–0.01)
NRBC BLD-RTO: 0 PER 100 WBC
PLATELET # BLD AUTO: 317 K/UL (ref 150–400)
PMV BLD AUTO: 9.7 FL (ref 8.9–12.9)
POTASSIUM SERPL-SCNC: 3.8 MMOL/L (ref 3.5–5.1)
PROT SERPL-MCNC: 6 G/DL (ref 6.4–8.2)
RBC # BLD AUTO: 2.54 M/UL (ref 4.1–5.7)
SODIUM SERPL-SCNC: 141 MMOL/L (ref 136–145)
TSH SERPL DL<=0.05 MIU/L-ACNC: 4.08 UIU/ML (ref 0.36–3.74)
WBC # BLD AUTO: 3.5 K/UL (ref 4.1–11.1)

## 2023-06-02 ENCOUNTER — TELEPHONE (OUTPATIENT)
Age: 58
End: 2023-06-02

## 2023-06-02 NOTE — TELEPHONE ENCOUNTER
Spoke with patient and updated that Dr. Mikal López has yet to review his lab values and when he does he will get in touch regarding interpretation and recommendations. He states understanding and grateful for the call. Dr. Mikal López notified.

## 2023-06-02 NOTE — TELEPHONE ENCOUNTER
Spoke with patient and updated on Dr. Davonte Munoz comments, recommendations regarding his lab values. Advised the  need for repeat labs on Monday 6/5/23. Advised if he develops any increased dizziness, fatigue, weakness over the weekend, please  go to the emergency room since we need to make sure that he is not bleeding anymore. He states understanding and grateful for the call.

## 2023-06-02 NOTE — TELEPHONE ENCOUNTER
Please advise that he is newly anemic again, likely because of another ulcer. Please take pantoprazole 40 mg daily and Carafate 4 times daily, as prescribed. This is likely the cause of his discomfort. That said, there was also some mild elevation of his liver enzymes, AST and ALT. We should follow-up on these next week and consider liver ultrasound if pain persists or if liver enzymes remain high. If he develops increased dizziness, fatigue, weakness over the weekend, please have him go to the emergency room since we need to make sure that he is not bleeding anymore. Otherwise, would recommend follow-up labs next week. Lets touch base with him on Monday to see how he is doing to see if needs to be seen again or if he just needs to go to the lab. Very mildly underactive thyroid, elevated TSH. We will follow-up on this in the future. This is not causing any current issues. May just be because of the stress of current rectal issue.

## 2023-06-02 NOTE — TELEPHONE ENCOUNTER
Patient's wife states her 's lab results had red exclamation marks on it and she is concerned something might be wrong. She is asking for the nurse to call him back on his mobile phone [phone] to discuss the results.

## 2023-06-05 ENCOUNTER — TELEPHONE (OUTPATIENT)
Age: 58
End: 2023-06-05

## 2023-06-05 DIAGNOSIS — R74.8 LIVER ENZYME ELEVATION: ICD-10-CM

## 2023-06-05 DIAGNOSIS — D50.9 IRON DEFICIENCY ANEMIA, UNSPECIFIED IRON DEFICIENCY ANEMIA TYPE: ICD-10-CM

## 2023-06-05 DIAGNOSIS — R10.13 EPIGASTRIC PAIN: Primary | ICD-10-CM

## 2023-06-05 NOTE — TELEPHONE ENCOUNTER
Glad he is feeling better. Repeat labs to evaluate his anemia, liver enzymes have been ordered. Please have him go to the lab this week to have them done. Continue medications prescribed for presumed peptic ulcer bleed.

## 2023-06-05 NOTE — TELEPHONE ENCOUNTER
Spoke with patient and updated on Dr. Joe Echevarria comments, recommendations  to continue the current medications as Dr. Te Vargas instructed him on recently. Advised if he is getting low to let Dr. Te Vargas know. Labs to be taken this week. Patient states understanding and grateful for the call.

## 2023-06-05 NOTE — TELEPHONE ENCOUNTER
Spoke with patient and he reports feeling much better; no further dark stools. Fatigue has improved. No abdominal pain but occasional dizziness but has improved. No further heart palpitations. He is inquiring as to when Dr. Paola Johns would like to repeat the labs this week? Grateful for the call. Dr. Paola Johns notified.

## 2023-06-06 DIAGNOSIS — R10.13 EPIGASTRIC PAIN: ICD-10-CM

## 2023-06-06 DIAGNOSIS — R74.8 LIVER ENZYME ELEVATION: ICD-10-CM

## 2023-06-06 DIAGNOSIS — D50.9 IRON DEFICIENCY ANEMIA, UNSPECIFIED IRON DEFICIENCY ANEMIA TYPE: ICD-10-CM

## 2023-06-06 LAB
ALBUMIN SERPL-MCNC: 3.8 G/DL (ref 3.5–5)
ALBUMIN/GLOB SERPL: 1.7 (ref 1.1–2.2)
ALP SERPL-CCNC: 60 U/L (ref 45–117)
ALT SERPL-CCNC: 47 U/L (ref 12–78)
ANION GAP SERPL CALC-SCNC: 3 MMOL/L (ref 5–15)
AST SERPL-CCNC: 19 U/L (ref 15–37)
BILIRUB SERPL-MCNC: 0.5 MG/DL (ref 0.2–1)
BUN SERPL-MCNC: 17 MG/DL (ref 6–20)
BUN/CREAT SERPL: 18 (ref 12–20)
CALCIUM SERPL-MCNC: 8.4 MG/DL (ref 8.5–10.1)
CHLORIDE SERPL-SCNC: 111 MMOL/L (ref 97–108)
CO2 SERPL-SCNC: 28 MMOL/L (ref 21–32)
CREAT SERPL-MCNC: 0.94 MG/DL (ref 0.7–1.3)
ERYTHROCYTE [DISTWIDTH] IN BLOOD BY AUTOMATED COUNT: 14.9 % (ref 11.5–14.5)
FERRITIN SERPL-MCNC: 47 NG/ML (ref 26–388)
GLOBULIN SER CALC-MCNC: 2.3 G/DL (ref 2–4)
GLUCOSE SERPL-MCNC: 91 MG/DL (ref 65–100)
HCT VFR BLD AUTO: 29.4 % (ref 36.6–50.3)
HGB BLD-MCNC: 9.4 G/DL (ref 12.1–17)
MCH RBC QN AUTO: 31.6 PG (ref 26–34)
MCHC RBC AUTO-ENTMCNC: 32 G/DL (ref 30–36.5)
MCV RBC AUTO: 99 FL (ref 80–99)
NRBC # BLD: 0 K/UL (ref 0–0.01)
NRBC BLD-RTO: 0 PER 100 WBC
PLATELET # BLD AUTO: 251 K/UL (ref 150–400)
PMV BLD AUTO: 9.2 FL (ref 8.9–12.9)
POTASSIUM SERPL-SCNC: 4.2 MMOL/L (ref 3.5–5.1)
PROT SERPL-MCNC: 6.1 G/DL (ref 6.4–8.2)
RBC # BLD AUTO: 2.97 M/UL (ref 4.1–5.7)
SODIUM SERPL-SCNC: 142 MMOL/L (ref 136–145)
WBC # BLD AUTO: 2.9 K/UL (ref 4.1–11.1)

## 2023-06-07 DIAGNOSIS — D50.9 IRON DEFICIENCY ANEMIA, UNSPECIFIED IRON DEFICIENCY ANEMIA TYPE: Primary | ICD-10-CM

## 2023-06-08 NOTE — RESULT ENCOUNTER NOTE
Repeat labs to evaluate his anemia, liver enzymes. Please have him go to the lab this week to have them done. Continue medications prescribed for presumed peptic ulcer bleed.

## 2023-06-15 LAB
ALBUMIN SERPL-MCNC: 4.4 G/DL (ref 3.5–5)
ALBUMIN/GLOB SERPL: 1.6 (ref 1.1–2.2)
ALP SERPL-CCNC: 77 U/L (ref 45–117)
ALT SERPL-CCNC: 55 U/L (ref 12–78)
AST SERPL-CCNC: 34 U/L (ref 15–37)
BILIRUB DIRECT SERPL-MCNC: 0.2 MG/DL (ref 0–0.2)
BILIRUB SERPL-MCNC: 0.6 MG/DL (ref 0.2–1)
CHOLEST SERPL-MCNC: 157 MG/DL
GLOBULIN SER CALC-MCNC: 2.7 G/DL (ref 2–4)
HDLC SERPL-MCNC: 56 MG/DL
HDLC SERPL: 2.8 (ref 0–5)
LDLC SERPL CALC-MCNC: 80.4 MG/DL (ref 0–100)
PROT SERPL-MCNC: 7.1 G/DL (ref 6.4–8.2)
TRIGL SERPL-MCNC: 103 MG/DL
VLDLC SERPL CALC-MCNC: 20.6 MG/DL

## 2023-06-22 RX ORDER — PANTOPRAZOLE SODIUM 40 MG/1
TABLET, DELAYED RELEASE ORAL
Qty: 30 TABLET | Refills: 5 | Status: SHIPPED | OUTPATIENT
Start: 2023-06-22

## 2023-07-03 DIAGNOSIS — D50.9 IRON DEFICIENCY ANEMIA, UNSPECIFIED IRON DEFICIENCY ANEMIA TYPE: ICD-10-CM

## 2023-07-04 LAB
BASOPHILS # BLD: 0.1 K/UL (ref 0–0.1)
BASOPHILS NFR BLD: 1 % (ref 0–1)
DIFFERENTIAL METHOD BLD: ABNORMAL
EOSINOPHIL # BLD: 0.3 K/UL (ref 0–0.4)
EOSINOPHIL NFR BLD: 8 % (ref 0–7)
ERYTHROCYTE [DISTWIDTH] IN BLOOD BY AUTOMATED COUNT: 12.7 % (ref 11.5–14.5)
HCT VFR BLD AUTO: 36.4 % (ref 36.6–50.3)
HGB BLD-MCNC: 13.1 G/DL (ref 12.1–17)
IMM GRANULOCYTES # BLD AUTO: 0 K/UL (ref 0–0.04)
IMM GRANULOCYTES NFR BLD AUTO: 0 % (ref 0–0.5)
IRON SATN MFR SERPL: 12 % (ref 20–50)
IRON SERPL-MCNC: 48 UG/DL (ref 35–150)
LYMPHOCYTES # BLD: 1.6 K/UL (ref 0.8–3.5)
LYMPHOCYTES NFR BLD: 44 % (ref 12–49)
MCH RBC QN AUTO: 34.3 PG (ref 26–34)
MCHC RBC AUTO-ENTMCNC: 36 G/DL (ref 30–36.5)
MCV RBC AUTO: 95.3 FL (ref 80–99)
MONOCYTES # BLD: 0.5 K/UL (ref 0–1)
MONOCYTES NFR BLD: 13 % (ref 5–13)
NEUTS SEG # BLD: 1.3 K/UL (ref 1.8–8)
NEUTS SEG NFR BLD: 34 % (ref 32–75)
NRBC # BLD: 0.02 K/UL (ref 0–0.01)
NRBC BLD-RTO: 0.5 PER 100 WBC
PLATELET # BLD AUTO: 242 K/UL (ref 150–400)
PMV BLD AUTO: 10.1 FL (ref 8.9–12.9)
RBC # BLD AUTO: 3.82 M/UL (ref 4.1–5.7)
TIBC SERPL-MCNC: 387 UG/DL (ref 250–450)
WBC # BLD AUTO: 3.7 K/UL (ref 4.1–11.1)

## 2023-07-10 ENCOUNTER — OFFICE VISIT (OUTPATIENT)
Age: 58
End: 2023-07-10
Payer: COMMERCIAL

## 2023-07-10 VITALS
HEIGHT: 70 IN | HEART RATE: 56 BPM | BODY MASS INDEX: 24.39 KG/M2 | RESPIRATION RATE: 18 BRPM | WEIGHT: 170.4 LBS | DIASTOLIC BLOOD PRESSURE: 67 MMHG | TEMPERATURE: 97.8 F | OXYGEN SATURATION: 99 % | SYSTOLIC BLOOD PRESSURE: 102 MMHG

## 2023-07-10 DIAGNOSIS — D50.9 IRON DEFICIENCY ANEMIA, UNSPECIFIED IRON DEFICIENCY ANEMIA TYPE: Primary | ICD-10-CM

## 2023-07-10 DIAGNOSIS — D70.9 NEUTROPENIA, UNSPECIFIED TYPE (HCC): ICD-10-CM

## 2023-07-10 PROCEDURE — 99213 OFFICE O/P EST LOW 20 MIN: CPT | Performed by: INTERNAL MEDICINE

## 2023-07-10 RX ORDER — POLYETHYLENE GLYCOL 3350 17 G/17G
17 POWDER, FOR SOLUTION ORAL DAILY
COMMUNITY

## 2023-07-10 NOTE — PATIENT INSTRUCTIONS
Anemia has resolved. The white blood cell count remains slightly low. Please make sure to follow-up on this in 3 months. Please see me then. Please continue taking pantoprazole to prevent your stomach from further presumed ulcers.

## 2023-07-10 NOTE — PROGRESS NOTES
HISTORY OF PRESENT ILLNESS    Chief Complaint   Patient presents with    Abdominal Pain     F/up       Presents for follow-up of abdominal pain, weight loss, iron deficiency anemia. Was seen by me on May 31 with presyncope, weakness, abdominal pain. Black stool. Past history of peptic ulcer disease March 2020 requiring transfusion. Started on pantoprazole and Carafate by me due to presumed peptic ulcer disease. Was found to have a hemoglobin of 8.2 on May 31, 2023 when it was previously 15.1 on March 7, 2023. Patient was compliant with therapy and did not feel any worse, so was able to not be admitted to the hospital.  Repeat hemoglobin on June 6 was 9.4. Repeat hemoglobin on July 3 13.1. States he stopped pantoprazole 1 week ago? Taking miralax    Bipolar disorder. Sleeping well. Taking ambien ER 12.5 mg and sleeping well. Seeing Dr. Miguel Smith. Weaned off Quetiapine after a prolonged process with Dr. Ovi Bain. Review of Systems   All other systems reviewed and are negative, except as noted in HPI    Past Medical and Surgical History   has a past medical history of Adjustment disorder with mixed disturbance of emotions and conduct, Bipolar 1 disorder, mixed ([address]), BPH with obstruction/lower urinary tract symptoms, Chronic low back pain without sciatica, Chronic lumbar pain, GERD (gastroesophageal reflux disease), Neck pain, chronic, PUD (peptic ulcer disease), and Pure hypercholesterolemia. has a past surgical history that includes lumbar laminectomy (3/5/97); Vasectomy (2002); Upper gastrointestinal endoscopy (11/8/10); Colonoscopy (10/8/07); Colonoscopy (10/24/2019); Upper gastrointestinal endoscopy (03/20/2020); and Prostate Biopsy (04/25/2023). reports that he quit smoking about 37 years ago. His smoking use included cigarettes. He has a 2.50 pack-year smoking history. He has never used smokeless tobacco. He reports current alcohol use. He reports that he does not use drugs.     family

## 2023-08-21 RX ORDER — ALFUZOSIN HYDROCHLORIDE 10 MG/1
TABLET, EXTENDED RELEASE ORAL
Qty: 90 TABLET | Refills: 1 | Status: SHIPPED | OUTPATIENT
Start: 2023-08-21

## 2023-09-08 ENCOUNTER — TELEPHONE (OUTPATIENT)
Age: 58
End: 2023-09-08

## 2023-09-08 NOTE — TELEPHONE ENCOUNTER
Received approval  [facility] FEP for patients Pantoprazole Sodium 40 mg valid from 8/9/23-9/723.

## 2023-09-08 NOTE — TELEPHONE ENCOUNTER
PA completed by nurse via covermymeds has been approve by patient insurance. Please see below for approval dates and information. Pantoprazole  Sodium 40MG OR [facility] has been approved  The authorization is valid from 08/09/2023 through 09/07/2024.

## 2023-10-10 DIAGNOSIS — D50.9 IRON DEFICIENCY ANEMIA, UNSPECIFIED IRON DEFICIENCY ANEMIA TYPE: ICD-10-CM

## 2023-10-10 DIAGNOSIS — D70.9 NEUTROPENIA, UNSPECIFIED TYPE (HCC): ICD-10-CM

## 2023-10-11 LAB
BASOPHILS # BLD: 0 K/UL (ref 0–0.1)
BASOPHILS NFR BLD: 1 % (ref 0–1)
DIFFERENTIAL METHOD BLD: NORMAL
EOSINOPHIL # BLD: 0.1 K/UL (ref 0–0.4)
EOSINOPHIL NFR BLD: 2 % (ref 0–7)
ERYTHROCYTE [DISTWIDTH] IN BLOOD BY AUTOMATED COUNT: 14.4 % (ref 11.5–14.5)
FERRITIN SERPL-MCNC: 29 NG/ML (ref 26–388)
HCT VFR BLD AUTO: 39.3 % (ref 36.6–50.3)
HGB BLD-MCNC: 13.6 G/DL (ref 12.1–17)
IMM GRANULOCYTES # BLD AUTO: 0 K/UL (ref 0–0.04)
IMM GRANULOCYTES NFR BLD AUTO: 0 % (ref 0–0.5)
LYMPHOCYTES # BLD: 1.6 K/UL (ref 0.8–3.5)
LYMPHOCYTES NFR BLD: 32 % (ref 12–49)
MCH RBC QN AUTO: 30.5 PG (ref 26–34)
MCHC RBC AUTO-ENTMCNC: 34.6 G/DL (ref 30–36.5)
MCV RBC AUTO: 88.1 FL (ref 80–99)
MONOCYTES # BLD: 0.5 K/UL (ref 0–1)
MONOCYTES NFR BLD: 11 % (ref 5–13)
NEUTS SEG # BLD: 2.8 K/UL (ref 1.8–8)
NEUTS SEG NFR BLD: 54 % (ref 32–75)
NRBC # BLD: 0 K/UL (ref 0–0.01)
NRBC BLD-RTO: 0 PER 100 WBC
PLATELET # BLD AUTO: 238 K/UL (ref 150–400)
PMV BLD AUTO: 9.8 FL (ref 8.9–12.9)
RBC # BLD AUTO: 4.46 M/UL (ref 4.1–5.7)
WBC # BLD AUTO: 5.1 K/UL (ref 4.1–11.1)

## 2023-10-31 DIAGNOSIS — E78.00 PURE HYPERCHOLESTEROLEMIA, UNSPECIFIED: ICD-10-CM

## 2023-10-31 RX ORDER — SIMVASTATIN 20 MG
20 TABLET ORAL
Qty: 90 TABLET | Refills: 1 | Status: SHIPPED | OUTPATIENT
Start: 2023-10-31

## 2024-02-08 ENCOUNTER — OFFICE VISIT (OUTPATIENT)
Age: 59
End: 2024-02-08
Payer: COMMERCIAL

## 2024-02-08 VITALS
BODY MASS INDEX: 24.31 KG/M2 | RESPIRATION RATE: 16 BRPM | OXYGEN SATURATION: 98 % | SYSTOLIC BLOOD PRESSURE: 116 MMHG | DIASTOLIC BLOOD PRESSURE: 78 MMHG | WEIGHT: 169.8 LBS | HEIGHT: 70 IN | TEMPERATURE: 97.8 F | HEART RATE: 68 BPM

## 2024-02-08 DIAGNOSIS — R07.89 CHEST PRESSURE: ICD-10-CM

## 2024-02-08 DIAGNOSIS — R20.0 ARM NUMBNESS: Primary | ICD-10-CM

## 2024-02-08 DIAGNOSIS — F41.8 DEPRESSION WITH ANXIETY: ICD-10-CM

## 2024-02-08 DIAGNOSIS — M54.12 RIGHT CERVICAL RADICULOPATHY: ICD-10-CM

## 2024-02-08 PROCEDURE — 99214 OFFICE O/P EST MOD 30 MIN: CPT | Performed by: NURSE PRACTITIONER

## 2024-02-08 PROCEDURE — 93000 ELECTROCARDIOGRAM COMPLETE: CPT | Performed by: NURSE PRACTITIONER

## 2024-02-08 ASSESSMENT — ENCOUNTER SYMPTOMS
CHEST TIGHTNESS: 0
SINUS PRESSURE: 0
NAUSEA: 0
EYE REDNESS: 0
DIARRHEA: 0
BACK PAIN: 0
VOMITING: 0
EYES NEGATIVE: 1
COUGH: 0
RESPIRATORY NEGATIVE: 1
GASTROINTESTINAL NEGATIVE: 1
ABDOMINAL PAIN: 0
SHORTNESS OF BREATH: 0
BLOOD IN STOOL: 0
SINUS PAIN: 0
CONSTIPATION: 0
EYE PAIN: 0
RHINORRHEA: 0

## 2024-02-08 ASSESSMENT — PATIENT HEALTH QUESTIONNAIRE - PHQ9
SUM OF ALL RESPONSES TO PHQ QUESTIONS 1-9: 10
4. FEELING TIRED OR HAVING LITTLE ENERGY: 3
8. MOVING OR SPEAKING SO SLOWLY THAT OTHER PEOPLE COULD HAVE NOTICED. OR THE OPPOSITE, BEING SO FIGETY OR RESTLESS THAT YOU HAVE BEEN MOVING AROUND A LOT MORE THAN USUAL: 0
10. IF YOU CHECKED OFF ANY PROBLEMS, HOW DIFFICULT HAVE THESE PROBLEMS MADE IT FOR YOU TO DO YOUR WORK, TAKE CARE OF THINGS AT HOME, OR GET ALONG WITH OTHER PEOPLE: 2
9. THOUGHTS THAT YOU WOULD BE BETTER OFF DEAD, OR OF HURTING YOURSELF: 0
7. TROUBLE CONCENTRATING ON THINGS, SUCH AS READING THE NEWSPAPER OR WATCHING TELEVISION: 1
1. LITTLE INTEREST OR PLEASURE IN DOING THINGS: 2
6. FEELING BAD ABOUT YOURSELF - OR THAT YOU ARE A FAILURE OR HAVE LET YOURSELF OR YOUR FAMILY DOWN: 1
5. POOR APPETITE OR OVEREATING: 0
SUM OF ALL RESPONSES TO PHQ QUESTIONS 1-9: 10
3. TROUBLE FALLING OR STAYING ASLEEP: 1
SUM OF ALL RESPONSES TO PHQ9 QUESTIONS 1 & 2: 4
2. FEELING DOWN, DEPRESSED OR HOPELESS: 2
SUM OF ALL RESPONSES TO PHQ QUESTIONS 1-9: 10
SUM OF ALL RESPONSES TO PHQ QUESTIONS 1-9: 10

## 2024-02-08 NOTE — PROGRESS NOTES
Assessment and Plan     1. Arm numbness: Chronic, with no improvement. EKG shows sinus rhythm. EMG of bilateral upper extremities ordered. SXS to seek urgent care discussed. Stretching exercises discussed. Pt verbalized understanding.   -     EMG TWO EXTREMITIES UPPER; Future  -     AMB POC EKG ROUTINE  2. Chest pressure: Does not seem to be cardiac in nature. Warm/ice compresses and stretching exercises recommended.   -     AMB POC EKG ROUTINE  3. Right cervical radiculopathy: Chronic and intermittent, has not had recent flare-up episodes.   4. Depression with anxiety: Does not desire to re-start Seroquil due to side effects. Will continue to follow up with psychiatrist. Crisis plan discussed.        Benefits, risks, possible drug interactions, and side effects of all new medications were reviewed with the patient. Pt verbalized understanding.    An electronic signature was used to authenticate this note.  Maria Fernanda Burroughs, APRN - CNP  2/8/2024      Follow-up and Dispositions    Return if symptoms worsen or fail to improve.          History of Present Illness   Chief Complaint     Jhonatan Saunders Jr. is a 58 y.o. male here for had concerns including Numbness (Chronic, bilateral arms. ).   Mr. Saunders presents today with numbness to bilateral upper extremities, onset 1 year ago. Episodes are sporadic and had become more noticeable. Histo of cervical radiculopathy which it is intermittent. Admits some chest pressure with numbness to arms. Episodes can last for minutes to hours. Arm movement helps decrease pain. Denies dizziness, shortness of breath, palpitation.     Has history of depression and bipolar 1. He is supposed to be taken Seroquil due to side effects, last appt with psychiatrist a week ago. Admits he continues to feel depressed. Lives with wife, has good support system. Denies SI/HI, hallucinations.         Review of Systems  Review of Systems   Constitutional: Negative.  Negative for chills,

## 2024-02-14 DIAGNOSIS — N40.1 BPH WITH OBSTRUCTION/LOWER URINARY TRACT SYMPTOMS: ICD-10-CM

## 2024-02-14 DIAGNOSIS — N13.8 BPH WITH OBSTRUCTION/LOWER URINARY TRACT SYMPTOMS: ICD-10-CM

## 2024-02-14 DIAGNOSIS — E78.00 PURE HYPERCHOLESTEROLEMIA, UNSPECIFIED: ICD-10-CM

## 2024-02-14 DIAGNOSIS — R97.20 ELEVATED PROSTATE SPECIFIC ANTIGEN (PSA): Primary | ICD-10-CM

## 2024-02-14 RX ORDER — SIMVASTATIN 20 MG
20 TABLET ORAL
Qty: 90 TABLET | Refills: 1 | Status: SHIPPED | OUTPATIENT
Start: 2024-02-14

## 2024-02-14 RX ORDER — ALFUZOSIN HYDROCHLORIDE 10 MG/1
TABLET, EXTENDED RELEASE ORAL
Qty: 90 TABLET | Refills: 1 | Status: SHIPPED | OUTPATIENT
Start: 2024-02-14

## 2024-02-15 ENCOUNTER — TELEPHONE (OUTPATIENT)
Age: 59
End: 2024-02-15

## 2024-02-22 ENCOUNTER — PROCEDURE VISIT (OUTPATIENT)
Age: 59
End: 2024-02-22
Payer: COMMERCIAL

## 2024-02-22 DIAGNOSIS — R20.0 ARM NUMBNESS: ICD-10-CM

## 2024-02-22 DIAGNOSIS — R20.2 PARESTHESIA: Primary | ICD-10-CM

## 2024-02-22 PROCEDURE — 95913 NRV CNDJ TEST 13/> STUDIES: CPT | Performed by: PSYCHIATRY & NEUROLOGY

## 2024-02-22 PROCEDURE — 95886 MUSC TEST DONE W/N TEST COMP: CPT | Performed by: PSYCHIATRY & NEUROLOGY

## 2024-02-22 NOTE — PROGRESS NOTES
EMG/ NCS Report  [facility] [address], [address]  [place]  [address]   Ph: [phone]   FAX: [phone]/ [phone]  Test Date:  2019      Test Date:  2024    Patient: VERA CABEZAS : 1965 Physician: Fercho Gardner MD   Sex: Male Height: ' \" Ref Phys: ELY HANKINS   ID#: [id number] Weight:  lbs. Technician: Digna Daniels     Patient History / Exam:  CC: N umbness in marianne. hands/arm. symptoms x 6 mos .pt. c/o of shoulder/neck pain.    Patient is coming for neuropathy evaluation.      EMG & NCV Findings:  Evaluation of the left median motor and the right median motor nerves showed normal distal onset latency (L3.0, R3.1 ms), normal amplitude (L11.8, R13.4 mV), and normal conduction velocity (Elbow-Wrist, L53, R49 m/s).  The left ulnar motor and the right ulnar motor nerves showed normal distal onset latency (L2.7, R2.6 ms), normal amplitude (L10.4, R8.4 mV), normal conduction velocity (B Elbow-Wrist, L59, R59 m/s), and normal conduction velocity (A Elbow-B Elbow, L56, R53 m/s).  The left median sensory, the right median sensory, the left radial sensory, the right radial sensory, the left ulnar sensory, and the right ulnar sensory nerves showed normal distal peak latency (L2.9, R2.8, L1.8, R1.7, L2.9, R2.8 ms) and normal amplitude (L36.4, R56.2, L49.8, R86.1, L47.3, R34.1 µV).  The left median/ulnar (palm) comparison and the right median/ulnar (palm) comparison nerves showed normal distal onset latency (Median Palm, L1.3, R1.5 ms), normal distal peak latency (Median Palm, L1.8, R1.8 ms), normal amplitude (Median Palm, L53.6, R23.9 µV), normal distal onset latency (Ulnar Palm, L1.3, R1.3 ms), normal distal peak latency (Ulnar Palm, L1.7, R1.8 ms), and normal amplitude (Ulnar Palm, L18.5, R23.7 µV).  All left vs. right side differences were within normal limits.      All F Wave latencies were within normal limits.

## 2024-03-28 LAB — PSA, EXTERNAL: 5

## 2024-05-01 ENCOUNTER — OFFICE VISIT (OUTPATIENT)
Age: 59
End: 2024-05-01
Payer: COMMERCIAL

## 2024-05-01 VITALS
OXYGEN SATURATION: 97 % | SYSTOLIC BLOOD PRESSURE: 98 MMHG | BODY MASS INDEX: 24.71 KG/M2 | HEIGHT: 70 IN | TEMPERATURE: 98.1 F | DIASTOLIC BLOOD PRESSURE: 60 MMHG | HEART RATE: 60 BPM | WEIGHT: 172.6 LBS | RESPIRATION RATE: 16 BRPM

## 2024-05-01 DIAGNOSIS — M54.2 NECK PAIN, CHRONIC: ICD-10-CM

## 2024-05-01 DIAGNOSIS — R97.20 ELEVATED PROSTATE SPECIFIC ANTIGEN (PSA): ICD-10-CM

## 2024-05-01 DIAGNOSIS — Z28.21 IMMUNIZATION DECLINED: ICD-10-CM

## 2024-05-01 DIAGNOSIS — G89.29 NECK PAIN, CHRONIC: ICD-10-CM

## 2024-05-01 DIAGNOSIS — Z00.00 PREVENTATIVE HEALTH CARE: ICD-10-CM

## 2024-05-01 DIAGNOSIS — N13.8 BPH WITH OBSTRUCTION/LOWER URINARY TRACT SYMPTOMS: ICD-10-CM

## 2024-05-01 DIAGNOSIS — Z00.00 PREVENTATIVE HEALTH CARE: Primary | ICD-10-CM

## 2024-05-01 DIAGNOSIS — F31.60 BIPOLAR 1 DISORDER, MIXED (HCC): ICD-10-CM

## 2024-05-01 DIAGNOSIS — E78.00 PURE HYPERCHOLESTEROLEMIA: ICD-10-CM

## 2024-05-01 DIAGNOSIS — M47.22 CERVICAL RADICULOPATHY DUE TO DEGENERATIVE JOINT DISEASE OF SPINE: ICD-10-CM

## 2024-05-01 DIAGNOSIS — D70.9 NEUTROPENIA, UNSPECIFIED TYPE (HCC): ICD-10-CM

## 2024-05-01 DIAGNOSIS — N40.1 BPH WITH OBSTRUCTION/LOWER URINARY TRACT SYMPTOMS: ICD-10-CM

## 2024-05-01 PROBLEM — K92.2 GI BLEED: Status: RESOLVED | Noted: 2020-03-19 | Resolved: 2024-05-01

## 2024-05-01 PROCEDURE — 99214 OFFICE O/P EST MOD 30 MIN: CPT | Performed by: INTERNAL MEDICINE

## 2024-05-01 PROCEDURE — 99396 PREV VISIT EST AGE 40-64: CPT | Performed by: INTERNAL MEDICINE

## 2024-05-01 RX ORDER — QUETIAPINE 300 MG/1
300 TABLET, FILM COATED, EXTENDED RELEASE ORAL NIGHTLY
COMMUNITY
Start: 2024-04-08

## 2024-05-01 ASSESSMENT — PATIENT HEALTH QUESTIONNAIRE - PHQ9
SUM OF ALL RESPONSES TO PHQ9 QUESTIONS 1 & 2: 2
SUM OF ALL RESPONSES TO PHQ QUESTIONS 1-9: 2
SUM OF ALL RESPONSES TO PHQ QUESTIONS 1-9: 2
2. FEELING DOWN, DEPRESSED OR HOPELESS: SEVERAL DAYS
SUM OF ALL RESPONSES TO PHQ QUESTIONS 1-9: 2
SUM OF ALL RESPONSES TO PHQ QUESTIONS 1-9: 2
1. LITTLE INTEREST OR PLEASURE IN DOING THINGS: SEVERAL DAYS

## 2024-05-01 NOTE — PROGRESS NOTES
Jhonatan Saunders Jr. is a 58 y.o. 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  Says that he is able to retire in May 2025 and is looking forward to it.    He is .  His son graduates and will be attending [employer] fall 2024.    Presents with bilateral arm numbness, intermittent.  Associated with neck pain.  He was seen here on February 28 and referred to neurology.  EMG showed no significant abnormalities.  Says symptoms are worse when supine.  He does have a history of focal radiculopathy.  MRI of the cervical spine January 2020 showed DDD, most prominent at C5-6 with mild canal stenosis and moderate left foraminal narrowing.    He is followed by Dr. Khan in psychiatry for Bipolar 1 disorder.  Also has significant adjustment disorder.  He is taking quetiapine and zolpidem.  Says he is sleeping fairly well using zolpidem.  Complains about ongoing weight issues which he contributes to quetiapine.  Says that Dr. Khan would like to change his medicines because his bipolar disorder is not optimally controlled, but patient says that he is able to work and functioning and prefers not to change anything.  He is concerned about risk of medication side effects and says it took a long time to get to medications that actually made him able to function.    BPH.  Seeing urology.  Taking Uroxatrol.  Symptoms are stable.    Hyperlipidemia  Currently he takes simvastatin 20 mg  ROS: taking medications as instructed, no medication side effects noted  No new myalgias, no joint pains, no weakness  No TIA's, no chest pain on exertion, no dyspnea on exertion, no swelling of ankles.   Lab Results   Component Value Date    CHOL 157 05/08/2023    TRIG 103 05/08/2023    HDL 56 05/08/2023    VLDL 20.6 05/08/2023    CHOLHDLRATIO 2.8 05/08/2023       Wt Readings from Last 3 Encounters:   05/01/24 78.3 kg (172 lb 9.6 oz)

## 2024-05-01 NOTE — PROGRESS NOTES
\"Have you been to the ER, urgent care clinic since your last visit?  Hospitalized since your last visit?\"    NO    “Have you seen or consulted any other health care providers outside of [facility] since your last visit?”    YES  Randell Khan MD  Psychiatrist  [address] [place] [address]                Click Here for Release of Records Request

## 2024-05-02 LAB
ALBUMIN SERPL-MCNC: 4.2 G/DL (ref 3.5–5)
ALBUMIN/GLOB SERPL: 1.5 (ref 1.1–2.2)
ALP SERPL-CCNC: 69 U/L (ref 45–117)
ALT SERPL-CCNC: 36 U/L (ref 12–78)
ANION GAP SERPL CALC-SCNC: 3 MMOL/L (ref 5–15)
AST SERPL-CCNC: 23 U/L (ref 15–37)
BILIRUB SERPL-MCNC: 0.7 MG/DL (ref 0.2–1)
BUN SERPL-MCNC: 18 MG/DL (ref 6–20)
BUN/CREAT SERPL: 20 (ref 12–20)
CALCIUM SERPL-MCNC: 9.4 MG/DL (ref 8.5–10.1)
CHLORIDE SERPL-SCNC: 106 MMOL/L (ref 97–108)
CHOLEST SERPL-MCNC: 146 MG/DL
CO2 SERPL-SCNC: 29 MMOL/L (ref 21–32)
CREAT SERPL-MCNC: 0.89 MG/DL (ref 0.7–1.3)
ERYTHROCYTE [DISTWIDTH] IN BLOOD BY AUTOMATED COUNT: 12 % (ref 11.5–14.5)
GLOBULIN SER CALC-MCNC: 2.8 G/DL (ref 2–4)
GLUCOSE SERPL-MCNC: 66 MG/DL (ref 65–100)
HCT VFR BLD AUTO: 42.2 % (ref 36.6–50.3)
HDLC SERPL-MCNC: 50 MG/DL
HDLC SERPL: 2.9 (ref 0–5)
HGB BLD-MCNC: 14.5 G/DL (ref 12.1–17)
LDLC SERPL CALC-MCNC: 69.2 MG/DL (ref 0–100)
MCH RBC QN AUTO: 32.2 PG (ref 26–34)
MCHC RBC AUTO-ENTMCNC: 34.4 G/DL (ref 30–36.5)
MCV RBC AUTO: 93.8 FL (ref 80–99)
NRBC # BLD: 0 K/UL (ref 0–0.01)
NRBC BLD-RTO: 0 PER 100 WBC
PLATELET # BLD AUTO: 212 K/UL (ref 150–400)
PMV BLD AUTO: 10 FL (ref 8.9–12.9)
POTASSIUM SERPL-SCNC: 4.2 MMOL/L (ref 3.5–5.1)
PROT SERPL-MCNC: 7 G/DL (ref 6.4–8.2)
RBC # BLD AUTO: 4.5 M/UL (ref 4.1–5.7)
SODIUM SERPL-SCNC: 138 MMOL/L (ref 136–145)
TRIGL SERPL-MCNC: 134 MG/DL
VLDLC SERPL CALC-MCNC: 26.8 MG/DL
WBC # BLD AUTO: 5.6 K/UL (ref 4.1–11.1)

## 2024-05-05 NOTE — RESULT ENCOUNTER NOTE
Results reviewed.  Please refer to [facility] comments.     Hide Include Location In Plan Question?: No Detail Level: Zone

## 2024-05-08 ENCOUNTER — HOSPITAL ENCOUNTER (OUTPATIENT)
Facility: HOSPITAL | Age: 59
Discharge: HOME | End: 2024-05-11
Attending: INTERNAL MEDICINE

## 2024-05-08 DIAGNOSIS — M47.22 CERVICAL RADICULOPATHY DUE TO DEGENERATIVE JOINT DISEASE OF SPINE: ICD-10-CM

## 2024-05-08 DIAGNOSIS — G89.29 NECK PAIN, CHRONIC: ICD-10-CM

## 2024-05-08 DIAGNOSIS — M54.2 NECK PAIN, CHRONIC: ICD-10-CM

## 2024-08-14 DIAGNOSIS — N40.1 BPH WITH OBSTRUCTION/LOWER URINARY TRACT SYMPTOMS: ICD-10-CM

## 2024-08-14 DIAGNOSIS — N13.8 BPH WITH OBSTRUCTION/LOWER URINARY TRACT SYMPTOMS: ICD-10-CM

## 2024-08-14 RX ORDER — ALFUZOSIN HYDROCHLORIDE 10 MG/1
TABLET, EXTENDED RELEASE ORAL
Qty: 90 TABLET | Refills: 1 | Status: SHIPPED | OUTPATIENT
Start: 2024-08-14

## 2024-08-14 RX ORDER — PANTOPRAZOLE SODIUM 40 MG/1
TABLET, DELAYED RELEASE ORAL
Qty: 30 TABLET | Refills: 5 | Status: SHIPPED | OUTPATIENT
Start: 2024-08-14

## 2024-08-14 NOTE — TELEPHONE ENCOUNTER
PCP: Charlie Herrmann MD    Last appt: 5/1/2024   No future appointments.    Requested Prescriptions     Pending Prescriptions Disp Refills    pantoprazole (PROTONIX) 40 MG tablet [Pharmacy Med Name: PANTOPRAZOLE SOD DR 40 MG TAB] 30 tablet 5     Sig: TAKE 1 TABLET BY MOUTH EVERY DAY BEFORE BREAKFAST    alfuzosin (UROXATRAL) 10 MG extended release tablet [Pharmacy Med Name: ALFUZOSIN HCL ER 10 MG TABLET] 90 tablet 1     Sig: TAKE 1 TABLET BY MOUTH EVERY DAY       Kiara \"Best\" JULIO Chang

## 2024-09-27 NOTE — TELEPHONE ENCOUNTER
Patient called to report that he has blood in his urine, lower back pain, & has been bruising easy. Patient is wondering if any of the medication he is taking could be the cause. Patient is on vacation but would like to know if he should stop any medications. Please call patient to advise.    [phone] Refill policies:    Allow 2-3 business days for refills; controlled substances may take longer.  Contact your pharmacy at least 5 days prior to running out of medication and have them send an electronic request or submit request through the “request refill” option in your [employer] account.  Refills are not addressed on weekends; covering physicians do not authorize routine medications on weekends.  No narcotics or controlled substances are refilled after noon on Fridays or by on call physicians.  By law, narcotics must be electronically prescribed.  A 30 day supply with no refills is the maximum allowed.  If your prescription is due for a refill, you may be due for a follow up appointment.  To best provide you care, patients receiving routine medications need to be seen at least once a year.  Patients receiving narcotic/controlled substance medications need to be seen at least once every 3 months.  In the event that your preferred pharmacy does not have the requested medication in stock (e.g. Backordered), it is your responsibility to find another pharmacy that has the requested medication available.  We will gladly send a new prescription to that pharmacy at your request.    Scheduling Tests:    If your physician has ordered radiology tests such as MRI or CT scans, please contact Central Scheduling at [phone] right away to schedule the test.  Once scheduled, the [facility] Centralized Referral Team will work with your insurance carrier to obtain pre-certification or prior authorization.  Depending on your insurance carrier, approval may take 3-10 days.  It is highly recommended patients assure they have received an authorization before having a test performed.  If test is done without insurance authorization, patient may be responsible for the entire amount billed.      Precertification and Prior Authorizations:  If your physician has recommended that you have a procedure or additional testing performed the [facility]  Centralized Referral Team will contact your insurance carrier to obtain pre-certification or prior authorization.    You are strongly encouraged to contact your insurance carrier to verify that your procedure/test has been approved and is a COVERED benefit.  Although the [facility] Centralized Referral Team does its due diligence, the insurance carrier gives the disclaimer that \"Although the procedure is authorized, this does not guarantee payment.\"    Ultimately the patient is responsible for payment.   Thank you for your understanding in this matter.  Paperwork Completion:  If you require FMLA or disability paperwork for your recovery, please make sure to either drop it off or have it faxed to our office at [phone]. Be sure the form has your name and date of birth on it.  The form will be faxed to our Forms Department and they will complete it for you.  There is a 25$ fee for all forms that need to be filled out.  Please be aware there is a 10-14 day turnaround time.  You will need to sign a release of information (TOBIN) form if your paperwork does not come with one.  You may call the Forms Department with any questions at [phone].  Their fax number is [phone].

## 2024-12-30 ENCOUNTER — OFFICE VISIT (OUTPATIENT)
Facility: CLINIC | Age: 59
End: 2024-12-30
Payer: COMMERCIAL

## 2024-12-30 VITALS
TEMPERATURE: 97.8 F | BODY MASS INDEX: 26.08 KG/M2 | DIASTOLIC BLOOD PRESSURE: 72 MMHG | HEART RATE: 72 BPM | SYSTOLIC BLOOD PRESSURE: 105 MMHG | OXYGEN SATURATION: 97 % | HEIGHT: 70 IN | WEIGHT: 182.2 LBS

## 2024-12-30 DIAGNOSIS — K27.9 PUD (PEPTIC ULCER DISEASE): ICD-10-CM

## 2024-12-30 DIAGNOSIS — N40.1 BPH WITH OBSTRUCTION/LOWER URINARY TRACT SYMPTOMS: ICD-10-CM

## 2024-12-30 DIAGNOSIS — N13.8 BPH WITH OBSTRUCTION/LOWER URINARY TRACT SYMPTOMS: ICD-10-CM

## 2024-12-30 DIAGNOSIS — D70.9 NEUTROPENIA, UNSPECIFIED TYPE (HCC): ICD-10-CM

## 2024-12-30 DIAGNOSIS — F31.60 BIPOLAR 1 DISORDER, MIXED (HCC): ICD-10-CM

## 2024-12-30 DIAGNOSIS — E78.00 PURE HYPERCHOLESTEROLEMIA: ICD-10-CM

## 2024-12-30 DIAGNOSIS — E78.00 PURE HYPERCHOLESTEROLEMIA: Primary | ICD-10-CM

## 2024-12-30 LAB
ALBUMIN SERPL-MCNC: 4.2 G/DL (ref 3.5–5)
ALBUMIN/GLOB SERPL: 1.7 (ref 1.1–2.2)
ALP SERPL-CCNC: 79 U/L (ref 45–117)
ALT SERPL-CCNC: 37 U/L (ref 12–78)
ANION GAP SERPL CALC-SCNC: 8 MMOL/L (ref 2–12)
AST SERPL-CCNC: 22 U/L (ref 15–37)
BILIRUB SERPL-MCNC: 0.6 MG/DL (ref 0.2–1)
BUN SERPL-MCNC: 18 MG/DL (ref 6–20)
BUN/CREAT SERPL: 17 (ref 12–20)
CALCIUM SERPL-MCNC: 9.3 MG/DL (ref 8.5–10.1)
CHLORIDE SERPL-SCNC: 108 MMOL/L (ref 97–108)
CHOLEST SERPL-MCNC: 257 MG/DL
CO2 SERPL-SCNC: 26 MMOL/L (ref 21–32)
CREAT SERPL-MCNC: 1.06 MG/DL (ref 0.7–1.3)
ERYTHROCYTE [DISTWIDTH] IN BLOOD BY AUTOMATED COUNT: 11.6 % (ref 11.5–14.5)
GLOBULIN SER CALC-MCNC: 2.5 G/DL (ref 2–4)
GLUCOSE SERPL-MCNC: 92 MG/DL (ref 65–100)
HCT VFR BLD AUTO: 43.2 % (ref 36.6–50.3)
HDLC SERPL-MCNC: 54 MG/DL
HDLC SERPL: 4.8 (ref 0–5)
HGB BLD-MCNC: 15.2 G/DL (ref 12.1–17)
LDLC SERPL CALC-MCNC: 177.2 MG/DL (ref 0–100)
MCH RBC QN AUTO: 31.5 PG (ref 26–34)
MCHC RBC AUTO-ENTMCNC: 35.2 G/DL (ref 30–36.5)
MCV RBC AUTO: 89.4 FL (ref 80–99)
NRBC # BLD: 0 K/UL (ref 0–0.01)
NRBC BLD-RTO: 0 PER 100 WBC
PLATELET # BLD AUTO: 204 K/UL (ref 150–400)
PMV BLD AUTO: 9.3 FL (ref 8.9–12.9)
POTASSIUM SERPL-SCNC: 4.2 MMOL/L (ref 3.5–5.1)
PROT SERPL-MCNC: 6.7 G/DL (ref 6.4–8.2)
RBC # BLD AUTO: 4.83 M/UL (ref 4.1–5.7)
SODIUM SERPL-SCNC: 142 MMOL/L (ref 136–145)
TRIGL SERPL-MCNC: 129 MG/DL
VLDLC SERPL CALC-MCNC: 25.8 MG/DL
WBC # BLD AUTO: 4.8 K/UL (ref 4.1–11.1)

## 2024-12-30 PROCEDURE — 99214 OFFICE O/P EST MOD 30 MIN: CPT | Performed by: INTERNAL MEDICINE

## 2024-12-30 SDOH — ECONOMIC STABILITY: INCOME INSECURITY: HOW HARD IS IT FOR YOU TO PAY FOR THE VERY BASICS LIKE FOOD, HOUSING, MEDICAL CARE, AND HEATING?: NOT HARD AT ALL

## 2024-12-30 SDOH — ECONOMIC STABILITY: FOOD INSECURITY: WITHIN THE PAST 12 MONTHS, THE FOOD YOU BOUGHT JUST DIDN'T LAST AND YOU DIDN'T HAVE MONEY TO GET MORE.: NEVER TRUE

## 2024-12-30 SDOH — ECONOMIC STABILITY: FOOD INSECURITY: WITHIN THE PAST 12 MONTHS, YOU WORRIED THAT YOUR FOOD WOULD RUN OUT BEFORE YOU GOT MONEY TO BUY MORE.: NEVER TRUE

## 2024-12-30 NOTE — PROGRESS NOTES
Identified pt with two pt identifiers(name and ). Reviewed record in preparation for visit and have obtained necessary documentation. All patient medications has been reviewed.  Chief Complaint   Patient presents with    Hypertension     Follow up since taken off simvastatin             Health Maintenance Due   Topic    Prostate Specific Antigen (PSA) Screening or Monitoring     Flu vaccine (1)    COVID-19 Vaccine (3 - 2023-24 season)     Health Maintenance Review: Patient reminded of \"due or due soon\" health maintenance. I have asked the patient to contact his/her primary care provider (PCP) for follow-up on his/her health maintenance.        Wt Readings from Last 3 Encounters:   24 82.6 kg (182 lb 3.2 oz)   24 78.3 kg (172 lb 9.6 oz)   24 77 kg (169 lb 12.8 oz)     Temp Readings from Last 3 Encounters:   24 97.8 °F (36.6 °C)   24 98.1 °F (36.7 °C) (Oral)   24 97.8 °F (36.6 °C) (Oral)     BP Readings from Last 3 Encounters:   24 105/72   24 98/60   24 116/78     Pulse Readings from Last 3 Encounters:   24 72   24 60   24 68       1. \"Have you been to the ER, urgent care clinic since your last visit?  Hospitalized since your last visit?\" No    2. \"Have you seen or consulted any other health care providers outside of the [facility] since your last visit?\" Yes, Dr. Khan for Psychiatry    3. For patients aged 45-75: Has the patient had a colonoscopy / FIT/ Cologuard? Yes - Care Gap present. Most recent result on file        Patient is accompanied by self I have received verbal consent from Jhonatan Saunders Jr. to discuss any/all medical information while they are present in the room.   
and Pure hypercholesterolemia.     has a past surgical history that includes lumbar laminectomy (3/5/97); Vasectomy (2002); Upper gastrointestinal endoscopy (11/8/10); Colonoscopy (10/8/07); Colonoscopy (10/24/2019); Upper gastrointestinal endoscopy (03/20/2020); and Prostate Biopsy (04/25/2023).     reports that he quit smoking about 39 years ago. His smoking use included cigarettes. He started smoking about 44 years ago. He has a 2.5 pack-year smoking history. He has never used smokeless tobacco. He reports that he does not currently use alcohol. He reports that he does not use drugs.    family history includes Other in his father.    Physical Exam   Nursing note and vitals reviewed.  Blood pressure 105/72, pulse 72, temperature 97.8 °F (36.6 °C), height 1.778 m (5' 10\"), weight 82.6 kg (182 lb 3.2 oz), SpO2 97%.  Constitutional:  No distress.    Eyes: Conjunctivae are normal.   Ears:  Hearing grossly intact  Cardiovascular: Normal rate. regular rhythm, no murmurs or gallops  No edema  Pulmonary/Chest: Effort normal.   CTAB  Musculoskeletal: moves all 4 extremities   Neurological: Alert and oriented to person, place, and time.   Skin: No visible rash noted.   Psychiatric: Normal mood and affect. Behavior is normal.     Assessment and Plan    Jhonatan was seen today for hyperlipidemia.    Diagnoses and all orders for this visit:    Pure hypercholesterolemia  Unclear status.  Not taking simvastatin for the past 6 months by his own choice.  He has no known history of cardiovascular disease and no reported family history.  -     Lipid Panel; Future  -     Comprehensive Metabolic Panel; Future    Neutropenia, unspecified type (HCC)  Unclear status.  Likely benign and possibly even resolved based on the most recent labs over the past year.  -     CBC; Future    PUD (peptic ulcer disease)  Asymptomatic.  Avoiding NSAIDs.  Not taking PPI therapy because of lack of risk at this point.  -     CBC; Future    BPH with

## 2025-01-01 DIAGNOSIS — E78.00 PURE HYPERCHOLESTEROLEMIA, UNSPECIFIED: ICD-10-CM

## 2025-01-01 RX ORDER — SIMVASTATIN 20 MG
20 TABLET ORAL NIGHTLY
Qty: 90 TABLET | Refills: 3 | Status: SHIPPED | OUTPATIENT
Start: 2025-01-01

## 2025-01-20 DIAGNOSIS — N40.1 BPH WITH OBSTRUCTION/LOWER URINARY TRACT SYMPTOMS: ICD-10-CM

## 2025-01-20 DIAGNOSIS — N13.8 BPH WITH OBSTRUCTION/LOWER URINARY TRACT SYMPTOMS: ICD-10-CM

## 2025-01-20 RX ORDER — ALFUZOSIN HYDROCHLORIDE 10 MG/1
10 TABLET, EXTENDED RELEASE ORAL DAILY
Qty: 90 TABLET | Refills: 1 | Status: SHIPPED | OUTPATIENT
Start: 2025-01-20

## (undated) DEVICE — BAG SPEC BIOHZRD 10 X 10 IN --

## (undated) DEVICE — CANNULA CUSH AD W/ 14FT TBG

## (undated) DEVICE — ELECTRODE,RADIOTRANSLUCENT,FOAM,3PK: Brand: MEDLINE

## (undated) DEVICE — CUFF BLD PRSS AD SZ 11 FOR 25-34CM 1 TB TRIPURPOSE CONN

## (undated) DEVICE — 1200 GUARD II KIT W/5MM TUBE W/O VAC TUBE: Brand: GUARDIAN

## (undated) DEVICE — SOLIDIFIER MEDC 1200ML -- CONVERT TO 356117

## (undated) DEVICE — 3M™ CUROS™ DISINFECTING CAP FOR NEEDLELESS CONNECTORS 270/CARTON 20 CARTONS/CASE CFF1-270: Brand: CUROS™

## (undated) DEVICE — BITEBLOCK ENDOSCP 60FR MAXI WHT POLYETH STURDY W/ VELC WVN

## (undated) DEVICE — CONTAINER SPEC 20 ML LID NEUT BUFF FORMALIN 10 % POLYPR STS

## (undated) DEVICE — ADULT SPO2 SENSOR: Brand: NELLCOR

## (undated) DEVICE — KIT COLON W/ 1.1OZ LUB AND 2 END

## (undated) DEVICE — FORCEPS BX L240CM JAW DIA2.8MM L CAP W/ NDL MIC MESH TOOTH